# Patient Record
Sex: MALE | Race: WHITE | Employment: FULL TIME | URBAN - METROPOLITAN AREA
[De-identification: names, ages, dates, MRNs, and addresses within clinical notes are randomized per-mention and may not be internally consistent; named-entity substitution may affect disease eponyms.]

---

## 2018-01-10 ENCOUNTER — OFFICE VISIT (OUTPATIENT)
Dept: FAMILY MEDICINE CLINIC | Age: 23
End: 2018-01-10
Payer: COMMERCIAL

## 2018-01-10 VITALS
WEIGHT: 152 LBS | BODY MASS INDEX: 27.97 KG/M2 | HEIGHT: 62 IN | OXYGEN SATURATION: 97 % | DIASTOLIC BLOOD PRESSURE: 72 MMHG | TEMPERATURE: 98.4 F | RESPIRATION RATE: 16 BRPM | HEART RATE: 102 BPM | SYSTOLIC BLOOD PRESSURE: 107 MMHG

## 2018-01-10 DIAGNOSIS — Z13.31 POSITIVE DEPRESSION SCREENING: ICD-10-CM

## 2018-01-10 DIAGNOSIS — J32.9 CHRONIC SINUSITIS, UNSPECIFIED LOCATION: Primary | ICD-10-CM

## 2018-01-10 LAB — S PYO AG THROAT QL: NORMAL

## 2018-01-10 PROCEDURE — 87880 STREP A ASSAY W/OPTIC: CPT | Performed by: FAMILY MEDICINE

## 2018-01-10 PROCEDURE — 99213 OFFICE O/P EST LOW 20 MIN: CPT | Performed by: FAMILY MEDICINE

## 2018-01-10 PROCEDURE — G8431 POS CLIN DEPRES SCRN F/U DOC: HCPCS | Performed by: FAMILY MEDICINE

## 2018-01-10 PROCEDURE — 96127 BRIEF EMOTIONAL/BEHAV ASSMT: CPT | Performed by: FAMILY MEDICINE

## 2018-01-10 RX ORDER — VILAZODONE HYDROCHLORIDE 20 MG/1
20 TABLET ORAL DAILY
COMMUNITY

## 2018-01-10 RX ORDER — NORETHINDRONE ACETATE AND ETHINYL ESTRADIOL 1.5-30(21)
1 KIT ORAL
COMMUNITY
Start: 2017-09-14 | End: 2020-05-26 | Stop reason: ALTCHOICE

## 2018-01-10 RX ORDER — AMOXICILLIN 250 MG/1
250 CAPSULE ORAL 3 TIMES DAILY
Qty: 30 CAPSULE | Refills: 0 | Status: SHIPPED | OUTPATIENT
Start: 2018-01-10 | End: 2018-01-20

## 2018-01-10 RX ORDER — AMOXICILLIN 250 MG/1
250 CAPSULE ORAL 3 TIMES DAILY
Qty: 30 CAPSULE | Refills: 0 | Status: SHIPPED | OUTPATIENT
Start: 2018-01-10 | End: 2018-01-10 | Stop reason: SDUPTHER

## 2018-01-10 RX ORDER — CETIRIZINE HYDROCHLORIDE 10 MG/1
10 TABLET ORAL DAILY
Qty: 30 TABLET | Refills: 3 | Status: SHIPPED | OUTPATIENT
Start: 2018-01-10 | End: 2018-12-03 | Stop reason: SDUPTHER

## 2018-01-10 RX ORDER — BUSPIRONE HYDROCHLORIDE 15 MG/1
20 TABLET ORAL
COMMUNITY
End: 2021-01-04 | Stop reason: SDUPTHER

## 2018-01-10 RX ORDER — CETIRIZINE HYDROCHLORIDE 10 MG/1
10 TABLET ORAL DAILY
Qty: 30 TABLET | Refills: 3 | Status: SHIPPED | OUTPATIENT
Start: 2018-01-10 | End: 2018-01-10 | Stop reason: SDUPTHER

## 2018-01-10 ASSESSMENT — PATIENT HEALTH QUESTIONNAIRE - PHQ9
SUM OF ALL RESPONSES TO PHQ QUESTIONS 1-9: 10
7. TROUBLE CONCENTRATING ON THINGS, SUCH AS READING THE NEWSPAPER OR WATCHING TELEVISION: 1
8. MOVING OR SPEAKING SO SLOWLY THAT OTHER PEOPLE COULD HAVE NOTICED. OR THE OPPOSITE, BEING SO FIGETY OR RESTLESS THAT YOU HAVE BEEN MOVING AROUND A LOT MORE THAN USUAL: 0
6. FEELING BAD ABOUT YOURSELF - OR THAT YOU ARE A FAILURE OR HAVE LET YOURSELF OR YOUR FAMILY DOWN: 1
9. THOUGHTS THAT YOU WOULD BE BETTER OFF DEAD, OR OF HURTING YOURSELF: 0
SUM OF ALL RESPONSES TO PHQ9 QUESTIONS 1 & 2: 3
3. TROUBLE FALLING OR STAYING ASLEEP: 2
1. LITTLE INTEREST OR PLEASURE IN DOING THINGS: 1
2. FEELING DOWN, DEPRESSED OR HOPELESS: 2
5. POOR APPETITE OR OVEREATING: 0
4. FEELING TIRED OR HAVING LITTLE ENERGY: 3
10. IF YOU CHECKED OFF ANY PROBLEMS, HOW DIFFICULT HAVE THESE PROBLEMS MADE IT FOR YOU TO DO YOUR WORK, TAKE CARE OF THINGS AT HOME, OR GET ALONG WITH OTHER PEOPLE: 1

## 2018-01-10 NOTE — PROGRESS NOTES
Visit Information    Have you changed or started any medications since your last visit including any over-the-counter medicines, vitamins, or herbal medicines? no   Are you having any side effects from any of your medications? -  no  Have you stopped taking any of your medications? Is so, why? -  no    Have you seen any other physician or provider since your last visit? Yes - Records Obtained  Have you had any other diagnostic tests since your last visit? No  Have you been seen in the emergency room and/or had an admission to a hospital since we last saw you? No  Have you had your routine dental cleaning in the past 6 months? no    Have you activated your [employer] account? If not, what are your barriers?  No: declined     Patient Care Team:  Cyn Flores CNP as PCP - General (Nurse Practitioner)    Medical History Review  Past Medical, Family, and Social History reviewed and does not contribute to the patient presenting condition    Health Maintenance   Topic Date Due    HIV screen  07/29/2011    Meningococcal (MCV) Vaccine Age 0-22 Years (1 of 1) 07/29/2012    Chlamydia screen  07/29/2012    DTaP/Tdap/Td vaccine (1 - Tdap) 07/29/2015    Cervical cancer screen  07/29/2017    Flu vaccine (1) 09/01/2017

## 2018-12-20 ENCOUNTER — OFFICE VISIT (OUTPATIENT)
Dept: FAMILY MEDICINE CLINIC | Age: 23
End: 2018-12-20
Payer: COMMERCIAL

## 2018-12-20 VITALS
HEART RATE: 90 BPM | HEIGHT: 62 IN | WEIGHT: 180.6 LBS | BODY MASS INDEX: 33.23 KG/M2 | RESPIRATION RATE: 14 BRPM | TEMPERATURE: 97.9 F | SYSTOLIC BLOOD PRESSURE: 107 MMHG | DIASTOLIC BLOOD PRESSURE: 72 MMHG

## 2018-12-20 DIAGNOSIS — R09.82 POSTNASAL DRIP: Primary | ICD-10-CM

## 2018-12-20 PROCEDURE — 99213 OFFICE O/P EST LOW 20 MIN: CPT | Performed by: FAMILY MEDICINE

## 2018-12-20 RX ORDER — TESTOSTERONE CYPIONATE 100 MG/ML
100 INJECTION, SOLUTION INTRAMUSCULAR ONCE
COMMUNITY
End: 2022-04-08

## 2018-12-20 RX ORDER — MONTELUKAST SODIUM 10 MG/1
10 TABLET ORAL NIGHTLY
Qty: 30 TABLET | Refills: 6 | Status: SHIPPED | OUTPATIENT
Start: 2018-12-20 | End: 2020-05-26 | Stop reason: ALTCHOICE

## 2018-12-20 RX ORDER — FLUTICASONE PROPIONATE 50 MCG
1 SPRAY, SUSPENSION (ML) NASAL DAILY
Qty: 2 BOTTLE | Refills: 1 | Status: SHIPPED | OUTPATIENT
Start: 2018-12-20 | End: 2021-01-04

## 2018-12-20 ASSESSMENT — PATIENT HEALTH QUESTIONNAIRE - PHQ9
SUM OF ALL RESPONSES TO PHQ QUESTIONS 1-9: 0
1. LITTLE INTEREST OR PLEASURE IN DOING THINGS: 0
SUM OF ALL RESPONSES TO PHQ9 QUESTIONS 1 & 2: 0
2. FEELING DOWN, DEPRESSED OR HOPELESS: 0
SUM OF ALL RESPONSES TO PHQ QUESTIONS 1-9: 0

## 2019-12-02 ENCOUNTER — OFFICE VISIT (OUTPATIENT)
Dept: FAMILY MEDICINE CLINIC | Age: 24
End: 2019-12-02
Payer: COMMERCIAL

## 2019-12-02 ENCOUNTER — HOSPITAL ENCOUNTER (OUTPATIENT)
Age: 24
Setting detail: SPECIMEN
Discharge: HOME | End: 2019-12-02
Payer: COMMERCIAL

## 2019-12-02 VITALS
SYSTOLIC BLOOD PRESSURE: 112 MMHG | DIASTOLIC BLOOD PRESSURE: 78 MMHG | OXYGEN SATURATION: 96 % | HEART RATE: 73 BPM | WEIGHT: 179.8 LBS | TEMPERATURE: 97.2 F | BODY MASS INDEX: 32.89 KG/M2

## 2019-12-02 DIAGNOSIS — R30.9 PAINFUL URINATION: ICD-10-CM

## 2019-12-02 DIAGNOSIS — R30.9 PAINFUL URINATION: Primary | ICD-10-CM

## 2019-12-02 LAB
BILIRUBIN, POC: NORMAL
BLOOD URINE, POC: NORMAL
CLARITY, POC: CLEAR
COLOR, POC: YELLOW
GLUCOSE URINE, POC: NORMAL
KETONES, POC: NORMAL
LEUKOCYTE EST, POC: NORMAL
NITRITE, POC: NORMAL
PH, POC: 6.5
PROTEIN, POC: NORMAL
SPECIFIC GRAVITY, POC: 1.02
UROBILINOGEN, POC: 0.2

## 2019-12-02 PROCEDURE — 99213 OFFICE O/P EST LOW 20 MIN: CPT | Performed by: FAMILY MEDICINE

## 2019-12-02 PROCEDURE — 81003 URINALYSIS AUTO W/O SCOPE: CPT | Performed by: FAMILY MEDICINE

## 2019-12-02 RX ORDER — ATORVASTATIN CALCIUM 10 MG/1
TABLET, FILM COATED ORAL
Refills: 3 | COMMUNITY
Start: 2019-11-26

## 2019-12-04 ENCOUNTER — TELEPHONE (OUTPATIENT)
Dept: FAMILY MEDICINE CLINIC | Age: 24
End: 2019-12-04

## 2019-12-04 DIAGNOSIS — B96.20 E. COLI UTI: Primary | ICD-10-CM

## 2019-12-04 DIAGNOSIS — N39.0 E. COLI UTI: Primary | ICD-10-CM

## 2019-12-04 LAB
CULTURE: ABNORMAL
Lab: ABNORMAL
SPECIMEN DESCRIPTION: ABNORMAL

## 2019-12-04 RX ORDER — CEPHALEXIN 500 MG/1
500 CAPSULE ORAL 4 TIMES DAILY
Qty: 20 CAPSULE | Refills: 0 | Status: SHIPPED | OUTPATIENT
Start: 2019-12-04 | End: 2019-12-09

## 2019-12-26 ENCOUNTER — OFFICE VISIT (OUTPATIENT)
Dept: FAMILY MEDICINE CLINIC | Age: 24
End: 2019-12-26
Payer: COMMERCIAL

## 2019-12-26 VITALS
TEMPERATURE: 97.5 F | HEART RATE: 81 BPM | HEIGHT: 62 IN | SYSTOLIC BLOOD PRESSURE: 115 MMHG | DIASTOLIC BLOOD PRESSURE: 75 MMHG | WEIGHT: 181.6 LBS | BODY MASS INDEX: 33.42 KG/M2 | RESPIRATION RATE: 14 BRPM

## 2019-12-26 DIAGNOSIS — K21.9 GASTROESOPHAGEAL REFLUX DISEASE, ESOPHAGITIS PRESENCE NOT SPECIFIED: Primary | ICD-10-CM

## 2019-12-26 PROCEDURE — 99213 OFFICE O/P EST LOW 20 MIN: CPT | Performed by: FAMILY MEDICINE

## 2019-12-26 RX ORDER — CIMETIDINE 800 MG
800 TABLET ORAL 2 TIMES DAILY
Qty: 30 TABLET | Refills: 3 | Status: SHIPPED | OUTPATIENT
Start: 2019-12-26 | End: 2020-04-14 | Stop reason: SDUPTHER

## 2020-04-14 ENCOUNTER — TELEPHONE (OUTPATIENT)
Dept: FAMILY MEDICINE CLINIC | Age: 25
End: 2020-04-14

## 2020-04-14 RX ORDER — CIMETIDINE 800 MG
800 TABLET ORAL 2 TIMES DAILY
Qty: 30 TABLET | Refills: 3 | Status: SHIPPED | OUTPATIENT
Start: 2020-04-14 | End: 2020-06-12 | Stop reason: SDUPTHER

## 2020-05-26 ENCOUNTER — HOSPITAL ENCOUNTER (OUTPATIENT)
Age: 25
Setting detail: SPECIMEN
Discharge: HOME | End: 2020-05-26
Payer: COMMERCIAL

## 2020-05-26 ENCOUNTER — OFFICE VISIT (OUTPATIENT)
Dept: PRIMARY CARE CLINIC | Age: 25
End: 2020-05-26
Payer: COMMERCIAL

## 2020-05-26 ENCOUNTER — TELEPHONE (OUTPATIENT)
Dept: FAMILY MEDICINE CLINIC | Age: 25
End: 2020-05-26

## 2020-05-26 VITALS
HEIGHT: 62 IN | DIASTOLIC BLOOD PRESSURE: 82 MMHG | HEART RATE: 85 BPM | OXYGEN SATURATION: 96 % | SYSTOLIC BLOOD PRESSURE: 126 MMHG | BODY MASS INDEX: 33.13 KG/M2 | TEMPERATURE: 99.9 F | WEIGHT: 180 LBS

## 2020-05-26 PROCEDURE — G8417 CALC BMI ABV UP PARAM F/U: HCPCS | Performed by: FAMILY MEDICINE

## 2020-05-26 PROCEDURE — 99213 OFFICE O/P EST LOW 20 MIN: CPT | Performed by: FAMILY MEDICINE

## 2020-05-26 PROCEDURE — 1036F TOBACCO NON-USER: CPT | Performed by: FAMILY MEDICINE

## 2020-05-26 PROCEDURE — G8427 DOCREV CUR MEDS BY ELIG CLIN: HCPCS | Performed by: FAMILY MEDICINE

## 2020-05-26 RX ORDER — EMTRICITABINE AND TENOFOVIR ALAFENAMIDE 200; 25 MG/1; MG/1
1 TABLET ORAL DAILY
COMMUNITY

## 2020-05-26 RX ORDER — LEVOTHYROXINE SODIUM 0.05 MG/1
50 TABLET ORAL DAILY
COMMUNITY

## 2020-05-26 RX ORDER — LORATADINE 10 MG/1
TABLET ORAL
COMMUNITY

## 2020-05-26 NOTE — TELEPHONE ENCOUNTER
Patient is calling for a return to work clearance      When did your symptoms first begin? 05/22/2020    When was the last day you had any symptoms including fever? 05/24/2020    When was the last date you took medication to treat a fever? Did not take anything for fever. Have you had a positive test result for COVID-19? No, no testing done. Does your employer require you to be tested for COVID-19 before you return to work? No    Do you need a letter to return to work?  Yes

## 2020-05-26 NOTE — PROGRESS NOTES
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 3 days    SOB: yes  Dry Cough: no    Nasal congestion/rhinorrhea: slight  Sinus pressure: no  Sore throat: slight    Any GI sx? diarrhrea    Fever: yes  Myalgias: no  Fatigue: yes    Fluid intake: good  Appetite: normla        Risk Factors  Smoker?: no  COPD/underlying lung disease?: no  CAD/CHF?: no  DM2?: no  CKD?: no  Liver disease?: no  Immunosuppressed?: no  Travel recently/Where?: no    Objective:  Physical Exam   Vitals:   Vitals:    05/26/20 1453   BP: 126/82   Site: Right Upper Arm   Position: Sitting   Cuff Size: Medium Adult   Pulse: 85   Temp: 99.9 °F (37.7 °C)   TempSrc: Oral   SpO2: 96%   Weight: 180 lb (81.6 kg)   Height: 5' 2\" (1.575 m)     Wt Readings from Last 3 Encounters:   05/26/20 180 lb (81.6 kg)   12/26/19 181 lb 9.6 oz (82.4 kg)   12/02/19 179 lb 12.8 oz (81.6 kg)     Ht Readings from Last 3 Encounters:   05/26/20 5' 2\" (1.575 m)   12/26/19 5' 2\" (1.575 m)   12/20/18 5' 2\" (1.575 m)     Body mass index is 32.92 kg/m². Constitutional: He is oriented to person, place, and time. He appears well-developed and well-nourished and in no acute distress. Answers all my questions appropriately. Head: Normocephalic and atraumatic. Eyes:conjunctiva appear normal.  Right Ear: External ear normal. TM is clear  Left Ear: External ear normal. TM is clear  Nose: pink, non-edematous mucosa. No polyps. No septal deviation, septal piercing noted. Throat: no erythema, tonsillar hypertrophy or exudate. No ulcerations noted. Lips/Teeth/Gums all appear normal.  Neck: Normal range of motion. Neck supple. No tracheal deviation present. No abnormal lymphadenopathy.      Heart - RRR w/o murmur. No S3/S4 noted  Chest: Clear to auscultation bilaterally. Good breath sounds noted. No rales, wheezes, or rhonchi noted. No respiratory retractions noted. Wall has symmetrical movement with respirations. COVID-19 pcr:  Pending  Assessment:   Encounter Diagnosis   Name Primary?  Flu-like symptoms Yes         Plan:   Medications Discontinued During This Encounter   Medication Reason    montelukast (SINGULAIR) 10 MG tablet Therapy completed    norethindrone-ethinyl estradiol-iron (MICROGESTIN FE1.5/30) 1.5-30 MG-MCG tablet Therapy completed    RA ALLERGY RELIEF 10 MG tablet Therapy completed     THE ABOVE NOTED DISCONTINUED MEDS MAY ONLY BE FROM 'CLEANING UP' THE MED LIST AND WERE NOT ACTUALLY CANCELED;  SEE CHART FOR DETAILS! No orders of the defined types were placed in this encounter. Orders Placed This Encounter   Procedures    Covid-19 Ambulatory     Standing Status:   Future     Standing Expiration Date:   5/26/2021     Scheduling Instructions:      Saline media preferred given current shortage of viral transport media but both acceptable     Return in about 1 week (around 6/2/2020). Patient Instructions   The COVID-19 test that was done today can take 1-6 days for results. Until then you should assume you have this disease and adhere to home isolation as described below. When we get the test results back, one of the following readings will be obtained. 1. A positive test means you have the virus. 2.  An inconclusive test means it wasn't sure if you have the virus or not. An inconclusive test result is treated as a positive result and recommendations  are the same as a positive test result. We may ask you to repeat this test in this circumstance. 3.  A negative test means you probably do not have the virus.       Prevention steps for People with positive or inconclusive test results or suspected  COVID-19 (including persons under investigation) who do not

## 2020-05-28 ENCOUNTER — CARE COORDINATION (OUTPATIENT)
Dept: CARE COORDINATION | Age: 25
End: 2020-05-28

## 2020-05-28 LAB — SARS-COV-2, NAA: NOT DETECTED

## 2020-05-29 ENCOUNTER — CARE COORDINATION (OUTPATIENT)
Dept: CARE COORDINATION | Age: 25
End: 2020-05-29

## 2020-06-02 ENCOUNTER — TELEMEDICINE (OUTPATIENT)
Dept: FAMILY MEDICINE CLINIC | Age: 25
End: 2020-06-02
Payer: COMMERCIAL

## 2020-06-02 PROCEDURE — 99213 OFFICE O/P EST LOW 20 MIN: CPT | Performed by: FAMILY MEDICINE

## 2020-06-02 PROCEDURE — G8427 DOCREV CUR MEDS BY ELIG CLIN: HCPCS | Performed by: FAMILY MEDICINE

## 2020-06-02 PROCEDURE — 1036F TOBACCO NON-USER: CPT | Performed by: FAMILY MEDICINE

## 2020-06-02 PROCEDURE — G8417 CALC BMI ABV UP PARAM F/U: HCPCS | Performed by: FAMILY MEDICINE

## 2020-06-02 ASSESSMENT — PATIENT HEALTH QUESTIONNAIRE - PHQ9
SUM OF ALL RESPONSES TO PHQ QUESTIONS 1-9: 0
SUM OF ALL RESPONSES TO PHQ QUESTIONS 1-9: 0
2. FEELING DOWN, DEPRESSED OR HOPELESS: 0
SUM OF ALL RESPONSES TO PHQ9 QUESTIONS 1 & 2: 0
1. LITTLE INTEREST OR PLEASURE IN DOING THINGS: 0

## 2020-06-02 NOTE — PROGRESS NOTES
 Depression    , History reviewed. No pertinent surgical history. ,   Social History     Tobacco Use    Smoking status: Never Smoker    Smokeless tobacco: Never Used   Substance Use Topics    Alcohol use: No    Drug use: No   , History reviewed. No pertinent family history. PHYSICAL EXAMINATION:  [ INSTRUCTIONS:  \"[x]\" Indicates a positive item  \"[]\" Indicates a negative item  -- DELETE ALL ITEMS NOT EXAMINED]  Vital Signs: (As obtained by patient/caregiver or practitioner observation)    Blood pressure-  Heart rate-    Respiratory rate-    Temperature-  Pulse oximetry-     Constitutional: [x] Appears well-developed and well-nourished [x] No apparent distress      [] Abnormal-   Mental status  [x] Alert and awake  [x] Oriented to person/place/time [x]Able to follow commands      Eyes:  EOM    [x]  Normal  [] Abnormal-  Sclera  [x]  Normal  [] Abnormal -         Discharge [x]  None visible  [] Abnormal -    HENT:   [x] Normocephalic, atraumatic. [] Abnormal   [x] Mouth/Throat: Mucous membranes are moist.     External Ears [x] Normal  [] Abnormal-     Neck: [x] No visualized mass     Pulmonary/Chest: [x] Respiratory effort normal.  [x] No visualized signs of difficulty breathing or respiratory distress        [] Abnormal-      Musculoskeletal:   [x] Normal gait with no signs of ataxia         [x] Normal range of motion of neck        [] Abnormal-       Neurological:        [x] No Facial Asymmetry (Cranial nerve 7 motor function) (limited exam to video visit)          [x] No gaze palsy        [] Abnormal-         Skin:        [x] No significant exanthematous lesions or discoloration noted on facial skin         [] Abnormal-            Psychiatric:       [x] Normal Affect [x] No Hallucinations        [] Abnormal-     Other pertinent observable physical exam findings-     ASSESSMENT/PLAN:  1. Viral URI      rtw today     Return if symptoms worsen or fail to improve.     Sarah Beth Wolfe is a 22 y.o. adult

## 2020-06-12 RX ORDER — CIMETIDINE 800 MG
800 TABLET ORAL 2 TIMES DAILY
Qty: 30 TABLET | Refills: 3 | Status: SHIPPED | OUTPATIENT
Start: 2020-06-12 | End: 2020-06-22 | Stop reason: SDUPTHER

## 2020-06-22 RX ORDER — CIMETIDINE 800 MG
800 TABLET ORAL 2 TIMES DAILY
Qty: 90 TABLET | Refills: 3 | Status: SHIPPED | OUTPATIENT
Start: 2020-06-22 | End: 2021-03-22 | Stop reason: SDUPTHER

## 2020-06-22 NOTE — TELEPHONE ENCOUNTER
Patient calling because his insurance only pays for 90 day prescription for medication through mail delivery pharmacy.  Please advise

## 2020-08-10 ENCOUNTER — OFFICE VISIT (OUTPATIENT)
Dept: FAMILY MEDICINE CLINIC | Age: 25
End: 2020-08-10
Payer: COMMERCIAL

## 2020-08-10 VITALS
OXYGEN SATURATION: 96 % | SYSTOLIC BLOOD PRESSURE: 118 MMHG | TEMPERATURE: 97.9 F | WEIGHT: 181.6 LBS | DIASTOLIC BLOOD PRESSURE: 74 MMHG | BODY MASS INDEX: 33.22 KG/M2 | HEART RATE: 72 BPM

## 2020-08-10 PROCEDURE — G8427 DOCREV CUR MEDS BY ELIG CLIN: HCPCS | Performed by: FAMILY MEDICINE

## 2020-08-10 PROCEDURE — 1036F TOBACCO NON-USER: CPT | Performed by: FAMILY MEDICINE

## 2020-08-10 PROCEDURE — 99213 OFFICE O/P EST LOW 20 MIN: CPT | Performed by: FAMILY MEDICINE

## 2020-08-10 PROCEDURE — G8417 CALC BMI ABV UP PARAM F/U: HCPCS | Performed by: FAMILY MEDICINE

## 2020-08-10 PROCEDURE — 4130F TOPICAL PREP RX AOE: CPT | Performed by: FAMILY MEDICINE

## 2020-08-10 ASSESSMENT — PATIENT HEALTH QUESTIONNAIRE - PHQ9
2. FEELING DOWN, DEPRESSED OR HOPELESS: 0
SUM OF ALL RESPONSES TO PHQ QUESTIONS 1-9: 0
SUM OF ALL RESPONSES TO PHQ9 QUESTIONS 1 & 2: 0
SUM OF ALL RESPONSES TO PHQ QUESTIONS 1-9: 0
1. LITTLE INTEREST OR PLEASURE IN DOING THINGS: 0

## 2020-08-10 NOTE — PROGRESS NOTES
General FM note    Jamar Sifuentes is a 26 y.o. adult who presents today for follow up on his  medical conditions as noted below. Jamar Sifuentes is c/o of   Chief Complaint   Patient presents with    Otalgia     right       Patient Active Problem List:     Depression with anxiety     Excessive daytime sleepiness     Flat feet, bilateral     Pain in both feet     Past Medical History:   Diagnosis Date    Depression       No past surgical history on file. No family history on file. Current Outpatient Medications   Medication Sig Dispense Refill    ciprofloxacin-dexamethasone (CIPRODEX) 0.3-0.1 % otic suspension Place 4 drops into both ears 2 times daily for 7 days 1 Bottle 0    cimetidine (TAGAMET) 800 MG tablet Take 1 tablet by mouth 2 times daily 90 tablet 3    loratadine (CLARITIN) 10 MG tablet Claritin TABS   Refills: 0    Active      levothyroxine (SYNTHROID) 50 MCG tablet Take 50 mcg by mouth Daily      Ferrous Sulfate (IRON PO) Take by mouth      emtricitabine-tenofovir alafenamide (DESCOVY) 200-25 MG TABS tablet Take 1 tablet by mouth daily      VITAMIN D PO Take by mouth      atorvastatin (LIPITOR) 10 MG tablet TAKE 1 TABLET BY MOUTH ONE TIME A DAY  3    testosterone cypionate (DEPOTESTOTERONE CYPIONATE) 100 MG/ML injection Inject 100 mg into the muscle once. TAKES 0.2ML .  busPIRone (BUSPAR) 15 MG tablet Take 20 mg by mouth       vilazodone HCl (VIIBRYD) 20 MG TABS Take 40 mg by mouth      fluticasone (FLONASE) 50 MCG/ACT nasal spray 1 spray by Each Nare route daily 1 Spray in each nostril (Patient not taking: Reported on 5/26/2020) 2 Bottle 1     No current facility-administered medications for this visit. ALLERGIES:    Allergies   Allergen Reactions    Bupropion      Unknown Reaction. Social History     Tobacco Use    Smoking status: Never Smoker    Smokeless tobacco: Never Used   Substance Use Topics    Alcohol use: No      Body mass index is 33.22 kg/m².   BP

## 2020-09-28 ENCOUNTER — NURSE ONLY (OUTPATIENT)
Dept: FAMILY MEDICINE CLINIC | Age: 25
End: 2020-09-28

## 2021-01-04 ENCOUNTER — HOSPITAL ENCOUNTER (OUTPATIENT)
Age: 26
Setting detail: SPECIMEN
Discharge: HOME | End: 2021-01-04
Payer: COMMERCIAL

## 2021-01-04 ENCOUNTER — OFFICE VISIT (OUTPATIENT)
Dept: FAMILY MEDICINE CLINIC | Age: 26
End: 2021-01-04
Payer: COMMERCIAL

## 2021-01-04 VITALS — OXYGEN SATURATION: 98 % | HEART RATE: 86 BPM | TEMPERATURE: 97.9 F

## 2021-01-04 DIAGNOSIS — J02.0 ACUTE STREPTOCOCCAL PHARYNGITIS: Primary | ICD-10-CM

## 2021-01-04 DIAGNOSIS — J02.9 SORE THROAT: ICD-10-CM

## 2021-01-04 LAB — S PYO AG THROAT QL: POSITIVE

## 2021-01-04 PROCEDURE — 87880 STREP A ASSAY W/OPTIC: CPT | Performed by: PHYSICIAN ASSISTANT

## 2021-01-04 PROCEDURE — 99213 OFFICE O/P EST LOW 20 MIN: CPT | Performed by: PHYSICIAN ASSISTANT

## 2021-01-04 RX ORDER — BUSPIRONE HYDROCHLORIDE 10 MG/1
TABLET ORAL
COMMUNITY
Start: 2020-11-18

## 2021-01-04 RX ORDER — AMOXICILLIN 500 MG/1
500 CAPSULE ORAL 3 TIMES DAILY
Qty: 30 CAPSULE | Refills: 0 | Status: SHIPPED | OUTPATIENT
Start: 2021-01-04 | End: 2021-01-14

## 2021-01-04 RX ORDER — NEEDLES, DISPOSABLE 25GX5/8"
NEEDLE, DISPOSABLE MISCELLANEOUS
COMMUNITY
Start: 2020-11-16

## 2021-01-04 RX ORDER — TESTOSTERONE CYPIONATE 200 MG/ML
INJECTION INTRAMUSCULAR
COMMUNITY
Start: 2020-12-21

## 2021-01-04 RX ORDER — ERGOCALCIFEROL 1.25 MG/1
CAPSULE ORAL
COMMUNITY
Start: 2020-11-13

## 2021-01-04 RX ORDER — PREDNISONE 20 MG/1
20 TABLET ORAL 2 TIMES DAILY
Qty: 10 TABLET | Refills: 0 | Status: SHIPPED | OUTPATIENT
Start: 2021-01-04 | End: 2021-01-09

## 2021-01-04 ASSESSMENT — ENCOUNTER SYMPTOMS
NAUSEA: 0
EYES NEGATIVE: 1
SINUS PRESSURE: 0
SORE THROAT: 1
CHANGE IN BOWEL HABIT: 0
VOMITING: 0
SWOLLEN GLANDS: 1
CHEST TIGHTNESS: 0
ABDOMINAL PAIN: 0
SINUS PAIN: 0
VISUAL CHANGE: 0
COUGH: 0

## 2021-01-04 NOTE — PROGRESS NOTES
[address] [place] [address]  [place] [address] [place] [address] [address] [address]  Phone: [phone]  Fax: [phone]       [address] [place] [address] Name: Eyad Blanc  MRN: [id number]  Brett 1995  Date of evaluation: 1/4/2021  Provider: Casper Arndt PA-C     CHIEF COMPLAINT       Chief Complaint   Patient presents with    Pharyngitis     and fever x 3 days           HISTORY OF PRESENT ILLNESS  (Location/Symptom, Timing/Onset, Context/Setting, Quality, Duration, Modifying Factors, Severity.)   Eyad Blanc is a 26 y.o. White [1] adult who presents to the office for evaluation of      Pharyngitis  This is a new problem. The current episode started in the past 7 days. The problem occurs daily. The problem has been waxing and waning. Associated symptoms include fatigue, a fever, myalgias, a sore throat and swollen glands. Pertinent negatives include no abdominal pain, anorexia, arthralgias, change in bowel habit, chest pain, chills, congestion, coughing, diaphoresis, headaches, joint swelling, nausea, neck pain, numbness, rash, urinary symptoms, vertigo, visual change, vomiting or weakness. The symptoms are aggravated by drinking, eating and swallowing. He has tried acetaminophen and NSAIDs for the symptoms. Nursing Notes were reviewed. REVIEW OF SYSTEMS    (2-9 systems for level 4, 10 or more for level 5)     Review of Systems   Constitutional: Positive for fatigue and fever. Negative for chills and diaphoresis. HENT: Positive for postnasal drip and sore throat. Negative for congestion, ear discharge, ear pain, sinus pressure and sinus pain. Eyes: Negative. Respiratory: Negative for cough and chest tightness. Cardiovascular: Negative for chest pain. Gastrointestinal: Negative for abdominal pain, anorexia, change in bowel habit, nausea and vomiting. Genitourinary: Negative. Musculoskeletal: Positive for myalgias. Negative for arthralgias, joint swelling and neck pain. Skin: Negative for rash. Neurological: Negative for vertigo, weakness, numbness and headaches. Except as noted above the remainder of the review of systems was reviewed andnegative. PAST MEDICAL HISTORY   History reviewed. Past Medical History:   Diagnosis Date    Depression          SURGICAL HISTORY     History reviewed. Past Surgical History:   Procedure Laterality Date    WISDOM TOOTH EXTRACTION           CURRENT MEDICATIONS       Current Outpatient Medications   Medication Sig Dispense Refill    busPIRone (BUSPAR) 10 MG tablet 20 mg daily       vitamin D (ERGOCALCIFEROL) 1.25 MG (74298 UT) CAPS capsule       BD DISP NEEDLE 23G X 1\" MISC       testosterone cypionate (DEPOTESTOTERONE CYPIONATE) 200 MG/ML injection       amoxicillin (AMOXIL) 500 MG capsule Take 1 capsule by mouth 3 times daily for 10 days 30 capsule 0    predniSONE (DELTASONE) 20 MG tablet Take 1 tablet by mouth 2 times daily for 5 days 10 tablet 0    cimetidine (TAGAMET) 800 MG tablet Take 1 tablet by mouth 2 times daily 90 tablet 3    loratadine (CLARITIN) 10 MG tablet Claritin TABS   Refills: 0    Active      levothyroxine (SYNTHROID) 50 MCG tablet Take 50 mcg by mouth Daily      Ferrous Sulfate (IRON PO) Take by mouth      emtricitabine-tenofovir alafenamide (DESCOVY) 200-25 MG TABS tablet Take 1 tablet by mouth daily      atorvastatin (LIPITOR) 10 MG tablet TAKE 1 TABLET BY MOUTH ONE TIME A DAY  3    vilazodone HCl (VIIBRYD) 20 MG TABS Take 40 mg by mouth      testosterone cypionate (DEPOTESTOTERONE CYPIONATE) 100 MG/ML injection Inject 100 mg into the muscle once. TAKES 0.2ML . No current facility-administered medications for this visit. ALLERGIES     Bupropion    FAMILY HISTORY     No family history on file.   Family Status   Relation Name Status    Mother  Alive    Father  Alive Order Specific Question:   Date of Symptom Onset     Answer:   1/2/2021     Order Specific Question:   Hospitalized for COVID-19? Answer:   No     Order Specific Question:   Admitted to ICU for COVID-19? Answer:   No     Order Specific Question:   Employed in healthcare setting? Answer:   Unknown     Order Specific Question:   Resident in a congregate (group) care setting? Answer:   Unknown     Order Specific Question:   Pregnant? Answer:   Unknown     Order Specific Question:   Pregnant: Answer:   No     Order Specific Question:   Previously tested for COVID-19? Answer: Yes    POCT rapid strep A       Results for orders placed or performed in visit on 01/04/21   POCT rapid strep A   Result Value Ref Range    Strep A Ag Positive (A) None Detected       FINALIMPRESSION      Visit Diagnoses and Associated Orders     Acute streptococcal pharyngitis    -  Primary    COVID-19 Ambulatory [[id number] Custom]   - Future Order         Sore throat        POCT rapid strep A [[id number] Custom]      COVID-19 Ambulatory [[id number] Custom]   - Future Order         ORDERS WITHOUT AN ASSOCIATED DIAGNOSIS    busPIRone (BUSPAR) 10 MG tablet [[id number]]      vitamin D (ERGOCALCIFEROL) 1.25 MG (57025 UT) CAPS capsule [[id number]]      BD DISP NEEDLE 23G X 1\" MISC [[id number]]      testosterone cypionate (DEPOTESTOTERONE CYPIONATE) 200 MG/ML injection [[id number]]      amoxicillin (AMOXIL) 500 MG capsule [451]      predniSONE (DELTASONE) 20 MG tablet [6496]              PLAN     Return if symptoms worsen or fail to improve.       DISCHARGEMEDICATIONS:  Orders Placed This Encounter   Medications    amoxicillin (AMOXIL) 500 MG capsule     Sig: Take 1 capsule by mouth 3 times daily for 10 days     Dispense:  30 capsule     Refill:  0    predniSONE (DELTASONE) 20 MG tablet     Sig: Take 1 tablet by mouth 2 times daily for 5 days     Dispense:  10 tablet     Refill:  0         Plan: Specimen sent for a culture. Possible treatment alteration based on the results. Patient instructed to complete entire antibiotic course. Tylenol/Motrin as needed for fever/discomfort. Change toothbrush in 24 hours. Salt water gargles and throat lozenges if desired. Patient agreeable to treatment plan. Educational materials provided on AVS.  Follow up if symptoms do not improve/worsen. Steps to help prevent the spread of COVID-19 if you are sick  SOURCE - [email or web address]info/. html     Stay home except to get medical care   ; Stay home: People who are mildly ill with COVID-19 are able to isolate at home during their illness. You should restrict activities outside your home, except for getting medical care.   ; Avoid public areas: Do not go to work, school, or public areas.   ; Avoid public transportation: Avoid using public transportation, ride-sharing, or taxis.  ; Separate yourself from other people and animals in your home   ; Stay away from others: As much as possible, you should stay in a specific room and away from other people in your home. Also, you should use a separate bathroom, if available.   ; Limit contact with pets & animals: You should restrict contact with pets and other animals while you are sick with COVID-19, just like you would around other people. Although there have not been reports of pets or other animals becoming sick with COVID-19, it is still recommended that people sick with COVID-19 limit contact with animals until more information is known about the virus. ; When possible, have another member of your household care for your animals while you are sick. If you are sick with COVID-19, avoid contact with your pet, including petting, snuggling, being kissed or licked, and sharing food. If you must care for your pet or be around animals while you are sick, wash your hands before and after you interact with pets and wear a facemask. See COVID-19 and Animals for more information. Other considerations  ? The ill person should eat/be fed in their room if possible. Non-disposable  items used should be handled with gloves and washed with hot water or in a . Clean hands after handling used  items. ? If possible, dedicate a lined trash can for the ill person. Use gloves when removing garbage bags, handling, and disposing of trash. Wash hands after handling or disposing of trash. ? Consider consulting with your local health department about trash disposal guidance if available. Information for Household Members and Caregivers of Someone who is Sick   Call ahead before visiting your doctor   Call ahead: If you have a medical appointment, call the healthcare provider and tell them that you have or may have COVID-19. This will help the healthcare provider's office take steps to keep other people from getting infected or exposed. Wear a facemask if you are sick   ; If you are sick: You should wear a facemask when you are around other people (e.g., sharing a room or vehicle) or pets and before you enter a healthcare provider's office. ; If you are caring for others: If the person who is sick is not able to wear a facemask (for example, because it causes trouble breathing), then people who live with the person who is sick should not stay in the same room with them, or they should wear a facemask if they enter a room with the person who is sick.   Cover your coughs and sneezes ; Cover: Cover your mouth and nose with a tissue when you cough or sneeze.   ; Dispose: Throw used tissues in a lined trash can.   ; Wash hands: Immediately wash your hands with soap and water for at least 20 seconds or, if soap and water are not available, clean your hands with an alcohol-based hand  that contains at least 60% alcohol. Clean your hands often   ; Wash hands: Wash your hands often with soap and water for at least 20 seconds, especially after blowing your nose, coughing, or sneezing; going to the bathroom; and before eating or preparing food.   ; Hand : If soap and water are not readily available, use an alcohol-based hand  with at least 60% alcohol, covering all surfaces of your hands and rubbing them together until they feel dry.   ; Soap and water: Soap and water are the best option if hands are visibly dirty.   ; Avoid touching: Avoid touching your eyes, nose, and mouth with unwashed hands. Handwashing Tips   ; Wet your hands with clean, running water (warm or cold), turn off the tap, and apply soap.  ; Lather your hands by rubbing them together with the soap. Lather the backs of your hands, between your fingers, and under your nails. ; Scrub your hands for at least 20 seconds. Need a timer? Hum the [place] from beginning to end twice.  ; Rinse your hands well under clean, running water.  ; Dry your hands using a clean towel or air dry them. Avoid sharing personal household items   ; Do not share: You should not share dishes, drinking glasses, cups, eating utensils, towels, or bedding with other people or pets in your home.   ; Wash thoroughly after use: After using these items, they should be washed thoroughly with soap and water. Clean all high-touch surfaces everyday   ; Clean and disinfect: Practice routine cleaning of high touch surfaces. ; High touch surfaces include counters, tabletops, doorknobs, bathroom fixtures, toilets, phones, keyboards, tablets, and bedside tables.  ; Disinfect areas with bodily fluids: Also, clean any surfaces that may have blood, stool, or body fluids on them.   ; Household : Use a household cleaning spray or wipe, according to the label instructions. Labels contain instructions for safe and effective use of the cleaning product including precautions you should take when applying the product, such as wearing gloves and making sure you have good ventilation during use of the product. Monitor your symptoms   Seek medical attention: Seek prompt medical attention if your illness is worsening     (e.g., difficulty breathing).   ; Call your doctor: Before seeking care, call your healthcare provider and tell them that you have, or are being evaluated for, COVID-19.   ; Wear a facemask when sick: Put on a facemask before you enter the facility. These steps will help the healthcare provider's office to keep other people in the office or waiting room from getting infected or exposed. ; Alert health department: Ask your healthcare provider to call the local or [facility] health department. Persons who are placed under active monitoring or facilitated self-monitoring should follow instructions provided by their local health department or occupational health professionals, as appropriate.  ; Call 911 if you have a medical emergency: If you have a medical emergency and need to call 911, notify the dispatch personnel that you have, or are being evaluated for COVID-19. If possible, put on a facemask before emergency medical services arrive. Patient instructed to return to the office if symptoms worsen, return, or have any other concerns. Patient understands and is agreeable.          Antonio Wang PA-C 1/4/2021 11:51 AM

## 2021-01-04 NOTE — LETTER
[address] [place] [address]  [place] [address]  Phone: [phone]  Fax: [phone]    Manuel Galeano PA-C        January 4, 2021     Patient: Lisa Vora   YOB: 1995   Date of Visit: 1/4/2021       To Whom it May Concern:    Bel Mcclendon was seen in my clinic on 1/4/2021. He is to remain off work until his [place] comes back negative    If you have any questions or concerns, please don't hesitate to call.     Sincerely,         Manuel Galeano PA-C

## 2021-01-04 NOTE — PATIENT INSTRUCTIONS
· Eat soft solids and drink plenty of clear liquids. Flavored ice pops, ice cream, scrambled eggs, sherbet, and gelatin dessert (such as Jell-O) may also soothe the throat. · Get lots of rest.  · Do not smoke, and avoid secondhand smoke. If you need help quitting, talk to your doctor about stop-smoking programs and medicines. These can increase your chances of quitting for good. · Use a vaporizer or humidifier to add moisture to the air in your bedroom. Follow the directions for cleaning the machine. When should you call for help? Call your doctor now or seek immediate medical care if:    · You have a new or higher fever.     · You have a fever with a stiff neck or severe headache.     · You have new or worse trouble swallowing.     · Your sore throat gets much worse on one side.     · Your pain becomes much worse on one side of your throat. Watch closely for changes in your health, and be sure to contact your doctor if:    · You are not getting better after 2 days (48 hours).     · You do not get better as expected. Where can you learn more? Go to https://CPUsage.Villij. org and sign in to your [employer] account. Enter K625 in the Kyleswutabout box to learn more about \"Strep Throat: Care Instructions. \"     If you do not have an account, please click on the \"Sign Up Now\" link. Current as of: April 15, 2020               Content Version: 12.6  © 1727-5347 "[employer]. Care instructions adapted under license by [facility] [place] ([facility]). If you have questions about a medical condition or this instruction, always ask your healthcare professional. [place] [address] any warranty or liability for your use of this information. Patient Education        Learning About Coronavirus [phone])  Coronavirus [phone]): Overview  What is coronavirus ([id number]-97)? The coronavirus disease (COVID-19) is caused by a virus. It is an illness that was first found in December 2019. It has since spread worldwide. The virus can cause fever, cough, and trouble breathing. In severe cases, it can cause pneumonia and make it hard to breathe without help. It can cause death. This virus spreads person-to-person through droplets from coughing and sneezing. It can also spread when you are close to someone who is infected. And it can spread when you touch something that has the virus on it, such as a doorknob or a tabletop. Coronaviruses are a large group of viruses. They cause the common cold. They also cause more serious illnesses like Middle East respiratory syndrome (MERS) and severe acute respiratory syndrome (SARS). COVID-19 is caused by a novel coronavirus. That means it's a new type that has not been seen in people before. How is COVID-19 treated? Mild illness can be treated at home, but more serious illness needs to be treated in the hospital. Treatment may include medicines to reduce symptoms, plus breathing support such as oxygen therapy or a ventilator. Other treatments, such as antiviral medicines, may help people who have COVID-19. What can you do to protect yourself from COVID-19? The best way to protect yourself from getting sick is to:  · Avoid areas where there is an outbreak. · Avoid contact with people who may be infected. · Avoid crowds and try to stay at least 6 feet away from other people. · Wash your hands often, especially after you cough or sneeze. Use soap and water, and scrub for at least 20 seconds. If soap and water aren't available, use an alcohol-based hand . · Avoid touching your mouth, nose, and eyes. What can you do to avoid spreading the virus to others? To help avoid spreading the virus to others:  · Wash your hands often with soap or alcohol-based hand sanitizers. · Cover your mouth with a tissue when you cough or sneeze. Then throw the tissue in the trash. · Use a disinfectant to clean things that you touch often. These include doorknobs, remote controls, phones, and handles on your refrigerator and microwave. And don't forget countertops, tabletops, bathrooms, and computer keyboards. · Wear a cloth face cover if you have to go to public areas. If you know or suspect that you have COVID-19:  · Stay home. Don't go to school, work, or public areas. And don't use public transportation, ride-shares, or taxis unless you have no choice. · Leave your home only if you need to get medical care or testing. But call the doctor's office first so they know you're coming. And wear a face cover. · Limit contact with people in your home. If possible, stay in a separate bedroom and use a separate bathroom. · Wear a face cover whenever you're around other people. It can help stop the spread of the virus when you cough or sneeze. · Clean and disinfect your home every day. Use household  and disinfectant wipes or sprays. Take special care to clean things that you grab with your hands. · Self-isolate until it's safe to be around others again. ? If you have symptoms, it's safe when you haven't had a fever for 3 days and your symptoms have improved and it's been at least 10 days since your symptoms started. ? If you were exposed to the virus but don't have symptoms, it's safe to be around others 14 days after exposure. ? Talk to your doctor about whether you also need testing, especially if you have a weakened immune system. When to call for help  Call 911 anytime you think you may need emergency care. For example, call if:  · You have severe trouble breathing. (You can't talk at all.)  · You have constant chest pain or pressure. · You are severely dizzy or lightheaded. · You are confused or can't think clearly. · Your face and lips have a blue color. · You passed out (lost consciousness) or are very hard to wake up. Call your doctor now if you develop symptoms such as:  · Shortness of breath. · Fever. · Cough. If you need to get care, call ahead to the doctor's office for instructions before you go. Make sure you wear a face cover to prevent exposing other people to the virus. Where can you get the latest information? The following health organizations are tracking and studying this virus. Their websites contain the most up-to-date information. Mehreen Schwartz also learn what to do if you think you may have been exposed to the virus. · U.S. Centers for Disease Control and Prevention (CDC): The CDC provides updated news about the disease and travel advice. The website also tells you how to prevent the spread of infection. www.cdc.gov  · World Health Organization [facility]): WHO offers information about the virus outbreaks. WHO also has travel advice. www.who.int  Current as of: July 10, 2020               Content Version: 12.6  © 2006-2020 [employer]. Care instructions adapted under license by [facility] ([facility]). If you have questions about a medical condition or this instruction, always ask your healthcare professional. [place] [address] any warranty or liability for your use of this information. Patient Education        Coronavirus (GMDHA-75): Care Instructions  Overview  The coronavirus disease (COVID-19) is caused by a virus. Symptoms may include a fever, a cough, and shortness of breath. It mainly spreads person-to-person through droplets from coughing and sneezing. The virus also can spread when people are in close contact with someone who is infected. Most people have mild symptoms and can take care of themselves at home. If their symptoms get worse, they may need care in a hospital. Treatment may include medicines to reduce symptoms, plus breathing support such as oxygen therapy or a ventilator. It's important to not spread the virus to others. If you have COVID-19, wear a face cover anytime you are around other people. You need to isolate yourself while you are sick. Leave your home only if you need to get medical care or testing. Follow-up care is a key part of your treatment and safety. Be sure to make and go to all appointments, and call your doctor if you are having problems. It's also a good idea to know your test results and keep a list of the medicines you take. How can you care for yourself at home? · Get extra rest. It can help you feel better. · Drink plenty of fluids. This helps replace fluids lost from fever. Fluids also help ease a scratchy throat. Water, soup, fruit juice, and hot tea with lemon are good choices. · Take acetaminophen (such as Tylenol) to reduce a fever. It may also help with muscle aches. Read and follow all instructions on the label. · Use petroleum jelly on sore skin. This can help if the skin around your nose and lips becomes sore from rubbing a lot with tissues. Tips for self-isolation  · Limit contact with people in your home. If possible, stay in a separate bedroom and use a separate bathroom. · Wear a cloth face cover when you are around other people. It can help stop the spread of the virus when you cough or sneeze. · If you have to leave home, avoid crowds and try to stay at least 6 feet away from other people. · Avoid contact with pets and other animals. · Cover your mouth and nose with a tissue when you cough or sneeze. Then throw it in the trash right away. · Wash your hands often, especially after you cough or sneeze. Use soap and water, and scrub for at least 20 seconds. If soap and water aren't available, use an alcohol-based hand . · Don't share personal household items. These include bedding, towels, cups and glasses, and eating utensils.

## 2021-01-05 LAB
SARS-COV-2, RAPID: NORMAL
SARS-COV-2: NORMAL
SARS-COV-2: NOT DETECTED
SOURCE: NORMAL

## 2021-01-07 ENCOUNTER — TELEPHONE (OUTPATIENT)
Dept: FAMILY MEDICINE CLINIC | Age: 26
End: 2021-01-07

## 2021-01-07 RX ORDER — CEPHALEXIN 500 MG/1
500 CAPSULE ORAL 2 TIMES DAILY
Qty: 14 CAPSULE | Refills: 0 | Status: SHIPPED | OUTPATIENT
Start: 2021-01-07 | End: 2021-01-14

## 2021-01-07 NOTE — TELEPHONE ENCOUNTER
Patient states that his throat it much worse than it was on Monday. Patient is asking if something else can be sent instead.      Please advise

## 2021-03-22 DIAGNOSIS — K21.9 GASTROESOPHAGEAL REFLUX DISEASE: ICD-10-CM

## 2021-03-23 RX ORDER — CIMETIDINE 800 MG
800 TABLET ORAL 2 TIMES DAILY
Qty: 90 TABLET | Refills: 3 | Status: SHIPPED | OUTPATIENT
Start: 2021-03-23 | End: 2022-04-19 | Stop reason: SDUPTHER

## 2021-03-24 RX ORDER — ONDANSETRON 8 MG/1
TABLET, ORALLY DISINTEGRATING ORAL
COMMUNITY
Start: 2021-01-19 | End: 2022-04-08

## 2021-03-24 RX ORDER — SYRINGE WITH NEEDLE, 1 ML 25GX5/8"
SYRINGE, EMPTY DISPOSABLE MISCELLANEOUS
COMMUNITY
Start: 2021-03-16

## 2021-03-24 RX ORDER — OXYCODONE HYDROCHLORIDE AND ACETAMINOPHEN 5; 325 MG/1; MG/1
TABLET ORAL
COMMUNITY
Start: 2021-01-19 | End: 2022-03-08 | Stop reason: ALTCHOICE

## 2021-09-19 ENCOUNTER — HOSPITAL ENCOUNTER (OUTPATIENT)
Age: 26
Setting detail: SPECIMEN
Discharge: HOME | End: 2021-09-19
Payer: COMMERCIAL

## 2021-09-19 ENCOUNTER — OFFICE VISIT (OUTPATIENT)
Dept: PRIMARY CARE CLINIC | Age: 26
End: 2021-09-19
Payer: COMMERCIAL

## 2021-09-19 VITALS
SYSTOLIC BLOOD PRESSURE: 111 MMHG | WEIGHT: 185 LBS | TEMPERATURE: 98.4 F | DIASTOLIC BLOOD PRESSURE: 72 MMHG | OXYGEN SATURATION: 95 % | HEART RATE: 74 BPM | BODY MASS INDEX: 34.04 KG/M2 | HEIGHT: 62 IN

## 2021-09-19 DIAGNOSIS — J01.90 ACUTE BACTERIAL SINUSITIS: Primary | ICD-10-CM

## 2021-09-19 DIAGNOSIS — J02.9 SORE THROAT: ICD-10-CM

## 2021-09-19 DIAGNOSIS — B96.89 ACUTE BACTERIAL SINUSITIS: Primary | ICD-10-CM

## 2021-09-19 LAB — S PYO AG THROAT QL: NORMAL

## 2021-09-19 PROCEDURE — G8417 CALC BMI ABV UP PARAM F/U: HCPCS | Performed by: NURSE PRACTITIONER

## 2021-09-19 PROCEDURE — 87880 STREP A ASSAY W/OPTIC: CPT | Performed by: NURSE PRACTITIONER

## 2021-09-19 PROCEDURE — 1036F TOBACCO NON-USER: CPT | Performed by: NURSE PRACTITIONER

## 2021-09-19 PROCEDURE — G8427 DOCREV CUR MEDS BY ELIG CLIN: HCPCS | Performed by: NURSE PRACTITIONER

## 2021-09-19 PROCEDURE — 99213 OFFICE O/P EST LOW 20 MIN: CPT | Performed by: NURSE PRACTITIONER

## 2021-09-19 RX ORDER — PREDNISONE 20 MG/1
40 TABLET ORAL DAILY
Qty: 10 TABLET | Refills: 0 | Status: SHIPPED | OUTPATIENT
Start: 2021-09-19 | End: 2021-09-24

## 2021-09-19 RX ORDER — AMOXICILLIN AND CLAVULANATE POTASSIUM 875; 125 MG/1; MG/1
1 TABLET, FILM COATED ORAL 2 TIMES DAILY
Qty: 20 TABLET | Refills: 0 | Status: SHIPPED | OUTPATIENT
Start: 2021-09-19 | End: 2021-09-29

## 2021-09-19 SDOH — ECONOMIC STABILITY: FOOD INSECURITY: WITHIN THE PAST 12 MONTHS, YOU WORRIED THAT YOUR FOOD WOULD RUN OUT BEFORE YOU GOT MONEY TO BUY MORE.: PATIENT DECLINED

## 2021-09-19 SDOH — ECONOMIC STABILITY: FOOD INSECURITY: WITHIN THE PAST 12 MONTHS, THE FOOD YOU BOUGHT JUST DIDN'T LAST AND YOU DIDN'T HAVE MONEY TO GET MORE.: PATIENT DECLINED

## 2021-09-19 ASSESSMENT — ENCOUNTER SYMPTOMS
WHEEZING: 0
SHORTNESS OF BREATH: 1
SORE THROAT: 1
SINUS PRESSURE: 0
EYE REDNESS: 0
CHEST TIGHTNESS: 0
EYE DISCHARGE: 0
COUGH: 1
VOICE CHANGE: 0

## 2021-09-19 ASSESSMENT — SOCIAL DETERMINANTS OF HEALTH (SDOH): HOW HARD IS IT FOR YOU TO PAY FOR THE VERY BASICS LIKE FOOD, HOUSING, MEDICAL CARE, AND HEATING?: PATIENT DECLINED

## 2021-09-19 NOTE — PROGRESS NOTES
[facility] [place]  [address] [place] [address]  Dept: [phone]  Dept Fax: [phone]     Jessica Ahumada is a 23 y.o. adult who presents to the urgent care today for his medicalconditions/complaints as noted below. Jessica Ahumada is c/o of Pharyngitis (started tuesday ), Shortness of Breath (started last night ), Cough, Headache, and Generalized Body Aches (worse on Monday )    HPI:      Pharyngitis  This is a new problem. Episode onset: 1 week ago. The problem has been gradually worsening. Associated symptoms include congestion, coughing, fatigue, headaches and a sore throat. Pertinent negatives include no chest pain, chills, fever, myalgias, rash or weakness. The symptoms are aggravated by drinking, eating and swallowing. Treatments tried: otc tx. The treatment provided no relief. Was at a music festival with a friend, friend is also ill with unknown illness.     Past Medical History:   Diagnosis Date    Depression       Current Outpatient Medications   Medication Sig Dispense Refill    amoxicillin-clavulanate (AUGMENTIN) 875-125 MG per tablet Take 1 tablet by mouth 2 times daily for 10 days 20 tablet 0    predniSONE (DELTASONE) 20 MG tablet Take 2 tablets by mouth daily for 5 days 10 tablet 0    ondansetron (ZOFRAN-ODT) 8 MG TBDP disintegrating tablet DISSOLVE 1 TABLET ON THE TONGUE EVERY 8 HOURS      oxyCODONE-acetaminophen (PERCOCET) 5-325 MG per tablet TAKE 1 TABLET BY MOUTH EVERY 6 HOURS AS NEEDED FOR PAIN      B-D 3CC LUER-LENA SYR 80RT5-0/2 22G X 1-1/2\" 3 ML MISC       busPIRone (BUSPAR) 10 MG tablet 20 mg daily       vitamin D (ERGOCALCIFEROL) 1.25 MG (83616 UT) CAPS capsule       BD DISP NEEDLE 23G X 1\" MISC       testosterone cypionate (DEPOTESTOTERONE CYPIONATE) 200 MG/ML injection       loratadine (CLARITIN) 10 MG tablet Claritin TABS   Refills: 0    Active      levothyroxine (SYNTHROID) 50 MCG tablet Take 50 mcg by mouth Daily      Ferrous Sulfate (IRON PO) Take by mouth      emtricitabine-tenofovir alafenamide (DESCOVY) 200-25 MG TABS tablet Take 1 tablet by mouth daily      atorvastatin (LIPITOR) 10 MG tablet TAKE 1 TABLET BY MOUTH ONE TIME A DAY  3    testosterone cypionate (DEPOTESTOTERONE CYPIONATE) 100 MG/ML injection Inject 100 mg into the muscle once. TAKES 0.2ML .  vilazodone HCl (VIIBRYD) 20 MG TABS Take 40 mg by mouth      cimetidine (TAGAMET) 800 MG tablet Take 1 tablet by mouth 2 times daily 90 tablet 3     No current facility-administered medications for this visit. Allergies   Allergen Reactions    Bupropion      Unknown Reaction. Reviewed PMH, SH, and FH with the patient and updated. Subjective:      Review of Systems   Constitutional: Positive for fatigue. Negative for chills and fever. HENT: Positive for congestion and sore throat. Negative for ear discharge, ear pain, postnasal drip, sinus pressure, sneezing and voice change. Eyes: Negative for discharge and redness. Respiratory: Positive for cough and shortness of breath. Negative for chest tightness and wheezing. Cardiovascular: Negative. Negative for chest pain. Musculoskeletal: Negative for myalgias. Skin: Negative for rash. Neurological: Positive for headaches. Negative for dizziness, weakness and light-headedness. Hematological: Negative for adenopathy. All other systems reviewed and are negative. Objective:      Physical Exam  Vitals and nursing note reviewed. Constitutional:       General: He is not in acute distress. Appearance: Normal appearance. He is well-developed. He is not ill-appearing, toxic-appearing or diaphoretic. HENT:      Head: Normocephalic. Right Ear: Tympanic membrane and external ear normal.      Left Ear: Tympanic membrane and external ear normal.      Nose: Congestion present. Right Sinus: No maxillary sinus tenderness or frontal sinus tenderness.       Left Sinus: No maxillary sinus tenderness or frontal sinus tenderness. Mouth/Throat:      Pharynx: Oropharyngeal exudate (PND) and posterior oropharyngeal erythema present. Eyes:      General:         Right eye: No discharge. Left eye: No discharge. Cardiovascular:      Rate and Rhythm: Normal rate and regular rhythm. Heart sounds: Normal heart sounds. No murmur heard. Pulmonary:      Effort: Pulmonary effort is normal. No respiratory distress. Breath sounds: Normal breath sounds. No wheezing or rales. Lymphadenopathy:      Cervical: No cervical adenopathy. Skin:     General: Skin is warm. Findings: No rash. Neurological:      Mental Status: He is alert. /72   Pulse 74   Temp 98.4 °F (36.9 °C) (Temporal)   Ht 5' 2\" (1.575 m)   Wt 185 lb (83.9 kg)   SpO2 95%   BMI 33.84 kg/m²     Assessment:       Diagnosis Orders   1. Acute bacterial sinusitis  amoxicillin-clavulanate (AUGMENTIN) 875-125 MG per tablet    predniSONE (DELTASONE) 20 MG tablet   2. Sore throat  POCT rapid strep A    COVID-19     Plan:      Based on the duration and severity of the symptoms-- I will treat this as bacterial at this time. Patient instructed to complete antibiotic prescription fully. Prednisone sent to the pharmacy to help enable symptom relief. Will send out COVID19 testing. Possible treatment alterations based on the results. Patient instructed to self-quarantine until testing results are back. Patient instructed not to return to work until results are back. Tylenol as needed for fever/pain. Encouraged adequate hydration and rest.  The patient indicates understanding of these issues and agrees with the plan. Educational materials provided on AVS.  Follow up if symptoms do not improve/worsen. Discussed symptoms that will warrant urgent ED evaluation/treatment.     Orders Placed This Encounter   Medications    amoxicillin-clavulanate (AUGMENTIN) 875-125 MG per tablet     Sig: Take 1 tablet by mouth 2 times daily for 10 days     Dispense:  20 tablet     Refill:  0    predniSONE (DELTASONE) 20 MG tablet     Sig: Take 2 tablets by mouth daily for 5 days     Dispense:  10 tablet     Refill:  0        Patient given educational materials - see patient instructions. Discussed use, benefit, and side effects of prescribed medications. All patientquestions answered. Pt voiced understanding.     Electronically signed by IONA Bolivar CNP on 9/19/2021at 3:54 PM

## 2021-09-19 NOTE — PATIENT INSTRUCTIONS
Patient Education        Sinusitis: Care Instructions  Your Care Instructions     Sinusitis is an infection of the lining of the sinus cavities in your head. Sinusitis often follows a cold. It causes pain and pressure in your head and face. In most cases, sinusitis gets better on its own in 1 to 2 weeks. But some mild symptoms may last for several weeks. Sometimes antibiotics are needed. Follow-up care is a key part of your treatment and safety. Be sure to make and go to all appointments, and call your doctor if you are having problems. It's also a good idea to know your test results and keep a list of the medicines you take. How can you care for yourself at home? · Take an over-the-counter pain medicine, such as acetaminophen (Tylenol), ibuprofen (Advil, Motrin), or naproxen (Aleve). Read and follow all instructions on the label. · If the doctor prescribed antibiotics, take them as directed. Do not stop taking them just because you feel better. You need to take the full course of antibiotics. · Be careful when taking over-the-counter cold or flu medicines and Tylenol at the same time. Many of these medicines have acetaminophen, which is Tylenol. Read the labels to make sure that you are not taking more than the recommended dose. Too much acetaminophen (Tylenol) can be harmful. · Breathe warm, moist air from a steamy shower, a hot bath, or a sink filled with hot water. Avoid cold, dry air. Using a humidifier in your home may help. Follow the directions for cleaning the machine. · Use saline (saltwater) nasal washes. This can help keep your nasal passages open and wash out mucus and bacteria. You can buy saline nose drops at a grocery store or drugstore. Or you can make your own at home by adding 1 teaspoon of salt and 1 teaspoon of baking soda to 2 cups of distilled water. If you make your own, fill a bulb syringe with the solution, insert the tip into your nostril, and squeeze gently.  Vicky Fan your nose.  · Put a hot, wet towel or a warm gel pack on your face 3 or 4 times a day for 5 to 10 minutes each time. · Try a decongestant nasal spray like oxymetazoline (Afrin). Do not use it for more than 3 days in a row. Using it for more than 3 days can make your congestion worse. When should you call for help? Call your doctor now or seek immediate medical care if:    · You have new or worse swelling or redness in your face or around your eyes.     · You have a new or higher fever. Watch closely for changes in your health, and be sure to contact your doctor if:    · You have new or worse facial pain.     · The mucus from your nose becomes thicker (like pus) or has new blood in it.     · You are not getting better as expected. Where can you learn more? Go to https://Wifi OnlinepeInSequent.CoupOption. org and sign in to your [employer] account. Enter U446 in the [place] box to learn more about \"Sinusitis: Care Instructions. \"     If you do not have an account, please click on the \"Sign Up Now\" link. Current as of: December 2, 2020               Content Version: 12.9  © 2006-2021 Healthwise, [facility]. Care instructions adapted under license by [facility] [place] ([facility]). If you have questions about a medical condition or this instruction, always ask your healthcare professional. [place] [address] any warranty or liability for your use of this information.

## 2021-09-20 LAB
SARS-COV-2: NORMAL
SARS-COV-2: NOT DETECTED
SOURCE: NORMAL

## 2021-10-22 ENCOUNTER — OFFICE VISIT (OUTPATIENT)
Dept: FAMILY MEDICINE CLINIC | Age: 26
End: 2021-10-22
Payer: COMMERCIAL

## 2021-10-22 VITALS
OXYGEN SATURATION: 100 % | HEIGHT: 62 IN | SYSTOLIC BLOOD PRESSURE: 117 MMHG | HEART RATE: 72 BPM | TEMPERATURE: 97.2 F | DIASTOLIC BLOOD PRESSURE: 73 MMHG | WEIGHT: 188.6 LBS | BODY MASS INDEX: 34.71 KG/M2

## 2021-10-22 DIAGNOSIS — Z00.00 ENCOUNTER FOR WELL ADULT EXAM WITHOUT ABNORMAL FINDINGS: Primary | ICD-10-CM

## 2021-10-22 PROCEDURE — G8484 FLU IMMUNIZE NO ADMIN: HCPCS | Performed by: FAMILY MEDICINE

## 2021-10-22 PROCEDURE — 99395 PREV VISIT EST AGE 18-39: CPT | Performed by: FAMILY MEDICINE

## 2021-10-22 ASSESSMENT — PATIENT HEALTH QUESTIONNAIRE - PHQ9
SUM OF ALL RESPONSES TO PHQ9 QUESTIONS 1 & 2: 1
SUM OF ALL RESPONSES TO PHQ QUESTIONS 1-9: 1
SUM OF ALL RESPONSES TO PHQ QUESTIONS 1-9: 1
1. LITTLE INTEREST OR PLEASURE IN DOING THINGS: 0
2. FEELING DOWN, DEPRESSED OR HOPELESS: 1
SUM OF ALL RESPONSES TO PHQ QUESTIONS 1-9: 1

## 2021-10-22 NOTE — PATIENT INSTRUCTIONS
Body Mass Index: Care Instructions  Your Care Instructions     Body mass index (BMI) can help you see if your weight is raising your risk for health problems. It uses a formula to compare how much you weigh with how tall you are. · A BMI lower than 18.5 is considered underweight. · A BMI between 18.5 and 24.9 is considered healthy. · A BMI between 25 and 29.9 is considered overweight. A BMI of 30 or higher is considered obese. If your BMI is in the normal range, it means that you have a lower risk for weight-related health problems. If your BMI is in the overweight or obese range, you may be at increased risk for weight-related health problems, such as high blood pressure, heart disease, stroke, arthritis or joint pain, and diabetes. If your BMI is in the underweight range, you may be at increased risk for health problems such as fatigue, lower protection (immunity) against illness, muscle loss, bone loss, hair loss, and hormone problems. BMI is just one measure of your risk for weight-related health problems. You may be at higher risk for health problems if you are not active, you eat an unhealthy diet, or you drink too much alcohol or use tobacco products. Follow-up care is a key part of your treatment and safety. Be sure to make and go to all appointments, and call your doctor if you are having problems. It's also a good idea to know your test results and keep a list of the medicines you take. How can you care for yourself at home? · Practice healthy eating habits. This includes eating plenty of fruits, vegetables, whole grains, lean protein, and low-fat dairy. · If your doctor recommends it, get more exercise. Walking is a good choice. Bit by bit, increase the amount you walk every day. Try for at least 30 minutes on most days of the week. · Do not smoke. Smoking can increase your risk for health problems. If you need help quitting, talk to your doctor about stop-smoking programs and medicines. These can increase your chances of quitting for good. · Limit alcohol to 2 drinks a day for men and 1 drink a day for women. Too much alcohol can cause health problems. If you have a BMI higher than 25  · Your doctor may do other tests to check your risk for weight-related health problems. This may include measuring the distance around your waist. A waist measurement of more than 40 inches in men or 35 inches in women can increase the risk of weight-related health problems. · Talk with your doctor about steps you can take to stay healthy or improve your health. You may need to make lifestyle changes to lose weight and stay healthy, such as changing your diet and getting regular exercise. If you have a BMI lower than 18.5  · Your doctor may do other tests to check your risk for health problems. · Talk with your doctor about steps you can take to stay healthy or improve your health. You may need to make lifestyle changes to gain or maintain weight and stay healthy, such as getting more healthy foods in your diet and doing exercises to build muscle. Where can you learn more? Go to https://LEID ProductsneftaliLiveStories.Nuforce. org and sign in to your [employer] account. Enter S176 in the [employer] box to learn more about \"Body Mass Index: Care Instructions. \"     If you do not have an account, please click on the \"Sign Up Now\" link. Current as of: March 17, 2021               Content Version: 13.0  © 9888-7386 Healthwise, Incorporated. Care instructions adapted under license by [facility] [place] ([facility]). If you have questions about a medical condition or this instruction, always ask your healthcare professional. [place] [address] any warranty or liability for your use of this information. Eating Healthy Foods: Care Instructions  Your Care Instructions     Eating healthy foods can help lower your risk for disease.  Healthy food gives you energy and keeps your heart strong, your brain active, your muscles working, and your bones strong. A healthy diet includes a variety of foods from the basic food groups: grains, vegetables, fruits, milk and milk products, and meat and beans. Some people may eat more of their favorite foods from only one food group and, as a result, miss getting the nutrients they need. So, it is important to pay attention not only to what you eat but also to what you are missing from your diet. You can eat a healthy, balanced diet by making a few small changes. Follow-up care is a key part of your treatment and safety. Be sure to make and go to all appointments, and call your doctor if you are having problems. It's also a good idea to know your test results and keep a list of the medicines you take. How can you care for yourself at home? Look at what you eat  · Keep a food diary for a week or two and record everything you eat or drink. Track the number of servings you eat from each food group. · For a balanced diet every day, eat a variety of:  ? 6 or more ounce-equivalents of grains, such as cereals, breads, crackers, rice, or pasta, every day. An ounce-equivalent is 1 slice of bread, 1 cup of ready-to-eat cereal, or ½ cup of cooked rice, cooked pasta, or cooked cereal.  ? 2½ cups of vegetables, especially:  § Dark-green vegetables such as broccoli and spinach. § Orange vegetables such as carrots and sweet potatoes. § Dry beans (such as dumont and kidney beans) and peas (such as lentils). ? 2 cups of fresh, frozen, or canned fruit. A small apple or 1 banana or orange equals 1 cup. ? 3 cups of nonfat or low-fat milk, yogurt, or other milk products. ? 5½ ounces of meat and beans, such as chicken, fish, lean meat, beans, nuts, and seeds. One egg, 1 tablespoon of peanut butter, ½ ounce nuts or seeds, or ¼ cup of cooked beans equals 1 ounce of meat. · Learn how to read food labels for serving sizes and ingredients.  Fast-food and convenience-food meals often contain few or no fruits or vegetables. Make sure you eat some fruits and vegetables to make the meal more nutritious. · Look at your food diary. For each food group, add up what you have eaten and then divide the total by the number of days. This will give you an idea of how much you are eating from each food group. See if you can find some ways to change your diet to make it more healthy. Start small  · Do not try to make dramatic changes to your diet all at once. You might feel that you are missing out on your favorite foods and then be more likely to fail. · Start slowly, and gradually change your habits. Try some of the following:  ? Use whole wheat bread instead of white bread. ? Use nonfat or low-fat milk instead of whole milk. ? Eat brown rice instead of white rice, and eat whole wheat pasta instead of white-flour pasta. ? Try low-fat cheeses and low-fat yogurt. ? Add more fruits and vegetables to meals and have them for snacks. ? Add lettuce, tomato, cucumber, and onion to sandwiches. ? Add fruit to yogurt and cereal.  Enjoy food  · You can still eat your favorite foods. You just may need to eat less of them. If your favorite foods are high in fat, salt, and sugar, limit how often you eat them, but do not cut them out entirely. · Eat a wide variety of foods. Make healthy choices when eating out  · The type of restaurant you choose can help you make healthy choices. Even fast-food chains are now offering more low-fat or healthier choices on the menu. · Choose smaller portions, or take half of your meal home. · When eating out, try:  ? A veggie pizza with a whole wheat crust or grilled chicken (instead of sausage or pepperoni). ? Pasta with roasted vegetables, grilled chicken, or marinara sauce instead of cream sauce. ? A vegetable wrap or grilled chicken wrap. ? Broiled or poached food instead of fried or breaded items.   Make healthy choices easy  · Buy packaged, prewashed, ready-to-eat fresh vegetables and fruits, such

## 2021-10-22 NOTE — PROGRESS NOTES
(VIIBRYD) 20 MG TABS Take 40 mg by mouth Yes Historical Provider, MD   ondansetron (ZOFRAN-ODT) 8 MG TBDP disintegrating tablet DISSOLVE 1 TABLET ON THE TONGUE EVERY 8 HOURS  Patient not taking: Reported on 10/22/2021  Historical Provider, MD   oxyCODONE-acetaminophen (PERCOCET) 5-325 MG per tablet TAKE 1 TABLET BY MOUTH EVERY 6 HOURS AS NEEDED FOR PAIN  Patient not taking: Reported on 10/22/2021  Historical Provider, MD   cimetidine (TAGAMET) 800 MG tablet Take 1 tablet by mouth 2 times daily  Marta Porras MD         Past Medical History:   Diagnosis Date    Depression        Past Surgical History:   Procedure Laterality Date    WISDOM TOOTH EXTRACTION         No family history on file. Social History     Tobacco Use    Smoking status: Never Smoker    Smokeless tobacco: Never Used   Substance Use Topics    Alcohol use: No    Drug use: No       Objective   /73   Pulse 72   Temp 97.2 °F (36.2 °C)   Ht 5' 2\" (1.575 m)   Wt 188 lb 9.6 oz (85.5 kg)   SpO2 100%   BMI 34.50 kg/m²   Wt Readings from Last 3 Encounters:   10/22/21 188 lb 9.6 oz (85.5 kg)   09/19/21 185 lb (83.9 kg)   08/10/20 181 lb 9.6 oz (82.4 kg)       Physical Exam   HENT:   /73   Pulse 72   Temp 97.2 °F (36.2 °C)   Ht 5' 2\" (1.575 m)   Wt 188 lb 9.6 oz (85.5 kg)   SpO2 100%   BMI 34.50 kg/m²   Constitutional: Alert and oriented. Well-nourished. Head: Normocephalic and atraumatic. Right Ear: External ear normal. TM: no bulging, erythema or fluid seen. Left Ear: External ear normal. TM: no bulging, erythema or fluid seen. Nose: Nose normal.   Mouth/Throat: Oropharynx is clear and moist.  Teeth in good repair. Eyes: Pupils are equal, round, and reactive to light. Right eye exhibits no discharge. Left eye exhibits no discharge. No scleral icterus. Glasses present. Neck: Normal range of motion. Neck supple. No JVD present. No tracheal deviation present. No thyromegaly present.    Cardiovascular: Normal rate, regular rhythm, normal heart sounds. Pulmonary/Chest: Effort normal and breath sounds normal. No respiratory distress. He has no wheezes. He has no rales. Abdominal: Soft. Bowel sounds are normal.  He exhibits no distension and no mass. There is no tenderness. There is no rebound and no guarding. Musculoskeletal: Normal range of motion. He exhibits no edema or tenderness. Lymphadenopathy:    He has no cervical adenopathy. Neurological:  He is alert and oriented to person, place, and time. Cranial nerves grossly intact. No sensation problem noted. Muscle strength 5/5 throughout. Skin: Skin is warm and dry. No rash noted. No erythema. Psychiatric:  He has a normal mood and affect. Behavior is normal.    Shivani Marcelino was seen today for annual exam.    Diagnoses and all orders for this visit:    Encounter for well adult exam without abnormal findings    The patient really does not have any concerns. He will get the blood work done with his endocrinologist.  He will discuss the chest pain with the endocrinologist.  Call for any changes. Go to the ER chest pain gets worse. Call or return to clinic prn if these symptoms worsen or fail to improve as anticipated. I have reviewed the instructions with the patient, answering all questions to his satisfaction.       Personalized Preventive Plan   Current Health Maintenance Status  Immunization History   Administered Date(s) Administered    COVID-19, J&J, PF, 0.5 mL 03/08/2021    Influenza Vaccine, unspecified formulation 02/02/2018    Influenza Virus Vaccine 10/31/2019        Health Maintenance   Topic Date Due    Hepatitis C screen  Never done    Varicella vaccine (1 of 2 - 2-dose childhood series) Never done    HPV vaccine (1 - 2-dose series) Never done    HIV screen  Never done    DTaP/Tdap/Td vaccine (1 - Tdap) Never done    Lipid screen  06/29/2017    Flu vaccine (1) 09/01/2021    COVID-19 Vaccine  Completed    Hepatitis A vaccine  Aged Out  Hepatitis B vaccine  Aged Out    Hib vaccine  Aged Out    Meningococcal (ACWY) vaccine  Aged Out    Pneumococcal 0-64 years Vaccine  Aged Out     Recommendations for [employer] Due: see orders and patient instructions/AVS.  .    (Please note that portions of this note were completed with a voice recognition program. Efforts were made to edit the dictations but occasionally words are mis-transcribed.)

## 2022-03-08 ENCOUNTER — OFFICE VISIT (OUTPATIENT)
Dept: FAMILY MEDICINE CLINIC | Age: 27
End: 2022-03-08
Payer: COMMERCIAL

## 2022-03-08 VITALS
TEMPERATURE: 99.3 F | BODY MASS INDEX: 35.33 KG/M2 | WEIGHT: 192 LBS | HEART RATE: 92 BPM | OXYGEN SATURATION: 97 % | HEIGHT: 62 IN | SYSTOLIC BLOOD PRESSURE: 124 MMHG | DIASTOLIC BLOOD PRESSURE: 84 MMHG

## 2022-03-08 DIAGNOSIS — K21.9 GASTROESOPHAGEAL REFLUX DISEASE WITHOUT ESOPHAGITIS: ICD-10-CM

## 2022-03-08 DIAGNOSIS — Z79.899 ON STATIN THERAPY: ICD-10-CM

## 2022-03-08 DIAGNOSIS — L30.9 DERMATITIS: ICD-10-CM

## 2022-03-08 DIAGNOSIS — E78.5 HYPERLIPIDEMIA, UNSPECIFIED HYPERLIPIDEMIA TYPE: ICD-10-CM

## 2022-03-08 DIAGNOSIS — Z01.419 ENCOUNTER FOR GYNECOLOGICAL EXAMINATION: ICD-10-CM

## 2022-03-08 DIAGNOSIS — E55.9 VITAMIN D DEFICIENCY: ICD-10-CM

## 2022-03-08 DIAGNOSIS — R53.83 OTHER FATIGUE: ICD-10-CM

## 2022-03-08 DIAGNOSIS — J30.9 ALLERGIC RHINITIS, UNSPECIFIED SEASONALITY, UNSPECIFIED TRIGGER: ICD-10-CM

## 2022-03-08 DIAGNOSIS — Q66.50 CONGENITAL PES PLANUS, UNSPECIFIED LATERALITY: ICD-10-CM

## 2022-03-08 DIAGNOSIS — E03.9 HYPOTHYROIDISM, UNSPECIFIED TYPE: ICD-10-CM

## 2022-03-08 DIAGNOSIS — Z76.89 ENCOUNTER TO ESTABLISH CARE WITH NEW DOCTOR: Primary | ICD-10-CM

## 2022-03-08 DIAGNOSIS — R19.7 DIARRHEA, UNSPECIFIED TYPE: ICD-10-CM

## 2022-03-08 PROCEDURE — G8484 FLU IMMUNIZE NO ADMIN: HCPCS | Performed by: INTERNAL MEDICINE

## 2022-03-08 PROCEDURE — G8427 DOCREV CUR MEDS BY ELIG CLIN: HCPCS | Performed by: INTERNAL MEDICINE

## 2022-03-08 PROCEDURE — 99205 OFFICE O/P NEW HI 60 MIN: CPT | Performed by: INTERNAL MEDICINE

## 2022-03-08 PROCEDURE — 1036F TOBACCO NON-USER: CPT | Performed by: INTERNAL MEDICINE

## 2022-03-08 PROCEDURE — G8417 CALC BMI ABV UP PARAM F/U: HCPCS | Performed by: INTERNAL MEDICINE

## 2022-03-08 ASSESSMENT — ENCOUNTER SYMPTOMS
RESPIRATORY NEGATIVE: 1
DIARRHEA: 1
EYES NEGATIVE: 1

## 2022-03-08 NOTE — PROGRESS NOTES
[address]  [address] [facility]  [facility]      Date of Visit:  3/8/2022  Patient Name: Guy Boykin   Patient :  1995     CHIEF COMPLAINT:     Guy Boykin is a 23 y.o. adult who presents today for an general visit to be evaluated for the following condition(s):  Chief Complaint   Patient presents with    Establish Care    GI Problem    Discuss Labs     b12, food allergy, blood type    Fatigue       REVIEW OF SYSTEM      Review of Systems   Constitutional: Positive for fatigue. HENT: Negative. Eyes: Negative. Respiratory: Negative. Cardiovascular: Negative. Gastrointestinal: Positive for diarrhea. Heartburn, refllux   Endocrine: Negative. Genitourinary: Negative. Musculoskeletal: Negative. Pes planus   Skin: Negative. Allergic/Immunologic: Positive for food allergies. Neurological: Negative. Hematological: Negative. Psychiatric/Behavioral: Negative. All other systems reviewed and are negative. HISTORY OF PRESENT ILLNESS     HPI  Patient presents to establish care. Patient also with multiple acute issues to address today. Patient also working with endocrine Dr. Karina Lui for gender change on long term testosterone treatments. Patient is concerned with possible food allergies - some foods seem to make throat feel funny. Patient also with chronic issues of diarrhea and reflux. Patient also wit chronic fatigue. REVIEWED INFORMATION      Allergies   Allergen Reactions    Bupropion      Unknown Reaction.        Patient Active Problem List   Diagnosis    Depression with anxiety    Excessive daytime sleepiness    Flat feet, bilateral    Pain in both feet    Hypothyroidism    Vitamin D deficiency    On statin therapy       Past Medical History:   Diagnosis Date    Depression        Past Surgical History:   Procedure Laterality Date  MASTECTOMY Bilateral 01/20/2021    WISDOM TOOTH EXTRACTION          Social History     Socioeconomic History    Marital status: Single     Spouse name: None    Number of children: None    Years of education: None    Highest education level: None   Occupational History    None   Tobacco Use    Smoking status: Never Smoker    Smokeless tobacco: Never Used   Substance and Sexual Activity    Alcohol use: No    Drug use: No    Sexual activity: None   Other Topics Concern    None   Social History Narrative    None     Social Determinants of Health     Financial Resource Strain: Unknown    Difficulty of Paying Living Expenses: Patient refused   Food Insecurity: Unknown    Worried About Running Out of Food in the Last Year: Patient refused    Ran Out of Food in the Last Year: Patient refused   Transportation Needs:     Lack of Transportation (Medical): Not on file    Lack of Transportation (Non-Medical):  Not on file   Physical Activity:     Days of Exercise per Week: Not on file    Minutes of Exercise per Session: Not on file   Stress:     Feeling of Stress : Not on file   Social Connections:     Frequency of Communication with Friends and Family: Not on file    Frequency of Social Gatherings with Friends and Family: Not on file    Attends Islam Services: Not on file    Active Member of [address] [place] [address] [employer] or Organizations: Not on file    Attends Club or Organization Meetings: Not on file    Marital Status: Not on file   Intimate Partner Violence:     Fear of Current or Ex-Partner: Not on file    Emotionally Abused: Not on file    Physically Abused: Not on file    Sexually Abused: Not on file   Housing Stability:     Unable to Pay for Housing in the Last Year: Not on file    Number of [place] in the Last Year: Not on file    Unstable Housing in the Last Year: Not on file        Family History   Problem Relation Age of Onset    Cancer Mother 48        Cervical    Alcohol Abuse Mother    [facility] Substance Abuse Mother     Alcohol Abuse Father     Substance Abuse Father        PHYSICAL EXAM     /84   Pulse 92   Temp 99.3 °F (37.4 °C)   Ht 5' 2\" (1.575 m)   Wt 192 lb (87.1 kg)   SpO2 97%   BMI 35.12 kg/m²    Physical Exam  Vitals and nursing note reviewed. Constitutional:       Appearance: Normal appearance. HENT:      Head: Normocephalic and atraumatic. Right Ear: Tympanic membrane, ear canal and external ear normal.      Left Ear: Tympanic membrane, ear canal and external ear normal.      Nose: Nose normal.      Mouth/Throat:      Mouth: Mucous membranes are moist.   Eyes:      Extraocular Movements: Extraocular movements intact. Conjunctiva/sclera: Conjunctivae normal.      Pupils: Pupils are equal, round, and reactive to light. Cardiovascular:      Rate and Rhythm: Normal rate and regular rhythm. Pulses: Normal pulses. Heart sounds: Normal heart sounds. Pulmonary:      Effort: Pulmonary effort is normal.      Breath sounds: Normal breath sounds. Abdominal:      General: Abdomen is flat. Bowel sounds are normal.      Palpations: Abdomen is soft. Musculoskeletal:         General: Normal range of motion. Cervical back: Normal range of motion and neck supple. Skin:     General: Skin is warm. Capillary Refill: Capillary refill takes less than 2 seconds. Neurological:      General: No focal deficit present. Mental Status: He is alert and oriented to person, place, and time. Psychiatric:         Mood and Affect: Mood normal.         Behavior: Behavior normal.         Thought Content: Thought content normal.         Judgment: Judgment normal.         ASSESSMENT/PLAN     1. Hypothyroidism, unspecified type  - CBC with Auto Differential; Future  - TSH; Future  - T4, Free; Future  - ABO/RH; Future  - Thyroid Antibodies; Future    2. On statin therapy  - CBC with Auto Differential; Future  - Lipid, Fasting;  Future  - Comprehensive Metabolic Panel, Fasting; Future  - ABO/RH; Future    3. Vitamin D deficiency  - CBC with Auto Differential; Future  - Vitamin D 25 Hydroxy; Future  - ABO/RH; Future    4. Hyperlipidemia, unspecified hyperlipidemia type  - CBC with Auto Differential; Future  - Lipid, Fasting; Future  - Comprehensive Metabolic Panel, Fasting; Future  - ABO/RH; Future    5. Other fatigue  - Vitamin B12 & Folate; Future  - ABO/RH; Future  - NNEKA Screen With Reflex; Future  - Rheumatoid Factor; Future    6. Diarrhea, unspecified type  - Abram Weir MD, Gastroenterology, [place]    7. Gastroesophageal reflux disease without esophagitis  - [facility] - Chaparro Olivera MD, Gastroenterology, [place]    8. Allergic rhinitis, unspecified seasonality, unspecified trigger  - Maureen Love MD, Allergy & Immunology, [place]    9. Dermatitis  - Dani Saini MD, Dermatology, [place]    10. Encounter for gynecological examination  - Terry Santiago MD OB/GYN, [place]    11. Congenital pes planus, unspecified laterality  - [facility] - Mary Lou Rosado DPM, Podiatry, [place]    12. Encounter to establish care with new doctor      Records reviewed    Patient presents to establish care. Patient also with multiple acute issues to address today. Patient also working with endocrine Dr. Jazlyn Sandhu for gender change on long term testosterone treatments. Patient is concerned with possible food allergies - some foods seem to make throat feel funny. Allergy referral placed. Patient also with chronic issues of diarrhea and reflux. Avoid known triggers. Check labs. GI referral palced    Patient also wit chronic fatigue.   Has had labs in the past.  Will recheck labs    Thyroid - cont medication, recheck labs, sees endocrine    Lipids - cont med, cont healthy diet, will recheck labs    History of vit d def - will recheck labs    Concern with dry skin - especially scalp - derm referral    Chronic juve pes planus - needs new inserts - referral placed to podiatry    Over 65 minutes spent on patient care and patient visit. Return for follow up in 6 to 8 weeks - please give 30 minutes.     COMMUNICATION:       Electronically signed by Caden Segura MD on 3/8/2022 at 11:09 AM

## 2022-03-10 LAB
ALBUMIN SERPL-MCNC: 4.7 G/DL
ALP BLD-CCNC: 77 U/L
ALT SERPL-CCNC: 68 U/L
AST SERPL-CCNC: 44 U/L
BASOPHILS ABSOLUTE: 0.07 /ΜL
BASOPHILS RELATIVE PERCENT: 0.9 %
BILIRUB SERPL-MCNC: 0.5 MG/DL (ref 0.1–1.4)
BUN BLDV-MCNC: 11 MG/DL
CALCIUM SERPL-MCNC: 9.1 MG/DL
CHLORIDE BLD-SCNC: 102 MMOL/L
CHOLESTEROL, FASTING: 132
CO2: 28 MMOL/L
CREAT SERPL-MCNC: 1.05 MG/DL
EOSINOPHILS ABSOLUTE: 0.17 /ΜL
EOSINOPHILS RELATIVE PERCENT: 2.2 %
FOLATE: >20
GLUCOSE FASTING: 89 MG/DL
HCT VFR BLD CALC: 45.9 %
HDLC SERPL-MCNC: 38 MG/DL (ref 35–70)
HEMOGLOBIN: 15.6 G/DL
LDL CHOLESTEROL CALCULATED: 69 MG/DL (ref 0–160)
LYMPHOCYTES ABSOLUTE: 2.71 /ΜL
LYMPHOCYTES RELATIVE PERCENT: 35.4 %
MCH RBC QN AUTO: 32.3 PG
MCHC RBC AUTO-ENTMCNC: 34 G/DL
MCV RBC AUTO: 95 FL
MONOCYTES ABSOLUTE: 0.63 /ΜL
MONOCYTES RELATIVE PERCENT: 8.2 %
NEUTROPHILS ABSOLUTE: 4.06 /ΜL
NEUTROPHILS RELATIVE PERCENT: 53 %
PDW BLD-RTO: 43 %
PLATELET # BLD: 266 K/ΜL
PMV BLD AUTO: NORMAL FL
POTASSIUM SERPL-SCNC: 4.9 MMOL/L
RBC # BLD: 4.83 10^6/ΜL
RHEUMATOID FACTOR: <9
SODIUM BLD-SCNC: 139 MMOL/L
T4 FREE: 1.05
TOTAL PROTEIN: 6.9 G/DL (ref 6.4–8.2)
TRIGLYCERIDE, FASTING: 123
TSH SERPL DL<=0.05 MIU/L-ACNC: 1.57 UIU/ML
VITAMIN B-12: 961
VITAMIN D 25-HYDROXY: 46.3
VITAMIN D2, 25 HYDROXY: NORMAL
VITAMIN D3,25 HYDROXY: NORMAL
WBC # BLD: 7.66 10^3/ML

## 2022-03-11 ENCOUNTER — OFFICE VISIT (OUTPATIENT)
Dept: DERMATOLOGY | Age: 27
End: 2022-03-11
Payer: COMMERCIAL

## 2022-03-11 VITALS
HEART RATE: 62 BPM | BODY MASS INDEX: 36.44 KG/M2 | HEIGHT: 62 IN | SYSTOLIC BLOOD PRESSURE: 116 MMHG | WEIGHT: 198 LBS | DIASTOLIC BLOOD PRESSURE: 77 MMHG | TEMPERATURE: 98.4 F | OXYGEN SATURATION: 97 %

## 2022-03-11 DIAGNOSIS — L21.9 SEBORRHEIC DERMATITIS: Primary | ICD-10-CM

## 2022-03-11 DIAGNOSIS — L40.8 INVERSE PSORIASIS: ICD-10-CM

## 2022-03-11 DIAGNOSIS — L85.8 KERATOSIS PILARIS: ICD-10-CM

## 2022-03-11 PROCEDURE — 99204 OFFICE O/P NEW MOD 45 MIN: CPT | Performed by: PHYSICIAN ASSISTANT

## 2022-03-11 RX ORDER — TRIAMCINOLONE ACETONIDE 1 MG/G
CREAM TOPICAL
Qty: 80 G | Refills: 1 | Status: SHIPPED | OUTPATIENT
Start: 2022-03-11

## 2022-03-11 RX ORDER — KETOCONAZOLE 20 MG/ML
SHAMPOO TOPICAL
Qty: 120 ML | Refills: 2 | Status: SHIPPED | OUTPATIENT
Start: 2022-03-11 | End: 2022-08-17 | Stop reason: SDUPTHER

## 2022-03-11 RX ORDER — FLUOCINONIDE TOPICAL SOLUTION USP, 0.05% 0.5 MG/ML
SOLUTION TOPICAL
Qty: 60 ML | Refills: 2 | Status: SHIPPED | OUTPATIENT
Start: 2022-03-11

## 2022-03-11 NOTE — PROGRESS NOTES
Dermatology Patient Note  [facility]\[address]  [address]  Dept: [phone]  Dept Fax: [phone]      VISITDATE: 3/11/2022   REFERRING PROVIDER: Lyly Francisco MD      Jon Lopez is a 23 y.o. adult  who presents today in the office for:    New Patient (dandruff in hair- tried medicated shampoo but tends to dry out hair & cause itching; lower back itching- tried OTC lotion & anti itch cream with little help/)      HISTORY OF PRESENT ILLNESS:  As above. C/o scaling and prutitus on scalp, and superior gluteal cleft.     MEDICAL PROBLEMS:  Patient Active Problem List    Diagnosis Date Noted    Hypothyroidism 03/08/2022    Vitamin D deficiency 03/08/2022    On statin therapy 03/08/2022    Pain in both feet 08/10/2016    Excessive daytime sleepiness 06/21/2016    Flat feet, bilateral 06/21/2016    Depression with anxiety 10/28/2014       CURRENT MEDICATIONS:   Current Outpatient Medications   Medication Sig Dispense Refill    ketoconazole (NIZORAL) 2 % shampoo Apply 3-4 times weekly to scalp, leave on for five minutes prior to washing off 120 mL 2    triamcinolone (KENALOG) 0.1 % cream Apply to rash twice daily (not face, armpit or groin) 80 g 1    fluocinonide (LIDEX) 0.05 % external solution Apply to scalp daily for rash 60 mL 2    ondansetron (ZOFRAN-ODT) 8 MG TBDP disintegrating tablet DISSOLVE 1 TABLET ON THE TONGUE EVERY 8 HOURS      B-D 3CC LUER-LENA SYR 99EE6-2/2 22G X 1-1/2\" 3 ML MISC       cimetidine (TAGAMET) 800 MG tablet Take 1 tablet by mouth 2 times daily 90 tablet 3    busPIRone (BUSPAR) 10 MG tablet 20 mg daily       vitamin D (ERGOCALCIFEROL) 1.25 MG (14741 UT) CAPS capsule       BD DISP NEEDLE 23G X 1\" MISC       testosterone cypionate (DEPOTESTOTERONE CYPIONATE) 200 MG/ML injection       loratadine (CLARITIN) 10 MG tablet Claritin TABS   Refills: 0    Active      levothyroxine (SYNTHROID) 50 MCG tablet Take 50 mcg by mouth Daily      Ferrous Sulfate (IRON PO) Take by mouth      emtricitabine-tenofovir alafenamide (DESCOVY) 200-25 MG TABS tablet Take 1 tablet by mouth daily      atorvastatin (LIPITOR) 10 MG tablet TAKE 1 TABLET BY MOUTH ONE TIME A DAY  3    testosterone cypionate (DEPOTESTOTERONE CYPIONATE) 100 MG/ML injection Inject 100 mg into the muscle once. TAKES 0.2ML .  vilazodone HCl (VIIBRYD) 20 MG TABS Take 40 mg by mouth       No current facility-administered medications for this visit. ALLERGIES:   Allergies   Allergen Reactions    Bupropion      Unknown Reaction. SOCIAL HISTORY:  Social History     Tobacco Use    Smoking status: Never Smoker    Smokeless tobacco: Never Used   Substance Use Topics    Alcohol use: No       Pertinent ROS:  Review of Systems  Skin: Denies any new changing, growing or bleeding lesions or rashes except as described in the HPI   Constitutional: Denies fevers, chills, and malaise. PHYSICAL EXAM:   /77   Pulse 62   Temp 98.4 °F (36.9 °C)   Ht 5' 2\" (1.575 m)   Wt 198 lb (89.8 kg)   SpO2 97%   BMI 36.21 kg/m²     The patient is generally well appearing, well nourished, alert and conversational. Affect is normal.    Cutaneous Exam:  Physical Exam  Face, neck, scalp, UE, and superior buttocks    Facial covering was not removed during examination. Diagnoses/exam findings/medical history pertinent to this visit are listed below:    Assessment:   Diagnosis Orders   1. Seborrheic dermatitis  ketoconazole (NIZORAL) 2 % shampoo    fluocinonide (LIDEX) 0.05 % external solution   2. Inverse psoriasis  triamcinolone (KENALOG) 0.1 % cream   3. Keratosis pilaris      - chronic illness with progression and/or exacerbation    Plan:  1. Seborrheic dermatitis  - ketoconazole (NIZORAL) 2 % shampoo;  Apply 3-4 times weekly to scalp, leave on for five minutes prior to washing off  Dispense: 120 mL; Refill: 2  - fluocinonide (LIDEX) 0.05 % external solution; Apply to scalp daily for rash  Dispense: 60 mL; Refill: 2    2. Inverse psoriasis  - triamcinolone (KENALOG) 0.1 % cream; Apply to rash twice daily (not face, armpit or groin)  Dispense: 80 g; Refill: 1  - Pt. Was counseled to use topical steroids sparingly in this area, in order to prevent thinning of the skin. 3. Keratosis pilaris  - reassurance and education    RTC 3 months    Future Appointments   Date Time Provider [place]   4/4/2022  9:30 AM Ayesha Chung DPM [facility]   4/19/2022  1:45 PM Dante Robin MD scar GI [facility]   5/3/2022 11:00 AM Zaida Mckeon MD SCAR FM [facility]   5/12/2022  1:15 PM Amena Guillermo PA-C  derm [facility]   5/23/2022  2:30 PM [facility]     Attending Supervising Physicians Attestation Statement  The patient met the criteria for indirect supervision. I discussed the findings and plans with the physician assistant and agree as documented in his note . Electronically signed by Miko Schulte MD on 3/13/22 at 3:51 PM EDT    Patient Instructions   For KP- use salicylic acid cereve SA moisturizer   Fluocinonide- Apply to scalp daily for rash  Nizoral shp 3-4 times weekly   kenolog cream - twice daily.          Electronically signed by Amena Guillermo PA-C on 3/11/22 at 3:03 PM EST

## 2022-03-11 NOTE — PATIENT INSTRUCTIONS
For KP- use salicylic acid cereve SA moisturizer   Fluocinonide- Apply to scalp daily for rash  Nizoral shp 3-4 times weekly   kenolog cream - twice daily.

## 2022-03-13 PROBLEM — E78.5 HYPERLIPIDEMIA: Status: ACTIVE | Noted: 2022-03-13

## 2022-03-13 PROBLEM — K21.9 GASTROESOPHAGEAL REFLUX DISEASE WITHOUT ESOPHAGITIS: Status: ACTIVE | Noted: 2022-03-13

## 2022-03-13 PROBLEM — R53.83 OTHER FATIGUE: Status: ACTIVE | Noted: 2022-03-13

## 2022-03-13 PROBLEM — L30.9 DERMATITIS: Status: ACTIVE | Noted: 2022-03-13

## 2022-03-13 PROBLEM — Q66.50 CONGENITAL PES PLANUS: Status: ACTIVE | Noted: 2022-03-13

## 2022-03-13 PROBLEM — R19.7 DIARRHEA: Status: ACTIVE | Noted: 2022-03-13

## 2022-03-13 PROBLEM — J30.9 ALLERGIC RHINITIS: Status: ACTIVE | Noted: 2022-03-13

## 2022-03-14 ENCOUNTER — TELEPHONE (OUTPATIENT)
Dept: FAMILY MEDICINE CLINIC | Age: 27
End: 2022-03-14

## 2022-03-14 DIAGNOSIS — E78.5 HYPERLIPIDEMIA, UNSPECIFIED HYPERLIPIDEMIA TYPE: ICD-10-CM

## 2022-03-14 DIAGNOSIS — E55.9 VITAMIN D DEFICIENCY: ICD-10-CM

## 2022-03-14 DIAGNOSIS — E03.9 HYPOTHYROIDISM, UNSPECIFIED TYPE: ICD-10-CM

## 2022-03-14 DIAGNOSIS — Z79.899 ON STATIN THERAPY: ICD-10-CM

## 2022-03-14 DIAGNOSIS — R53.83 OTHER FATIGUE: ICD-10-CM

## 2022-03-14 NOTE — TELEPHONE ENCOUNTER
----- Message from Nasima Chaudhry sent at 3/14/2022  3:24 PM EDT -----  Subject: Message to Provider    QUESTIONS  Information for Provider? Pt called in and stated got a referral last week   and stated they are saying they are having trouble getting the referral if   can be faxed over . The provider was Monie Lynn and fax number   [phone]  ---------------------------------------------------------------------------  --------------  Ronit HIGHTOWER  What is the best way for the office to contact you? OK to leave message on   voicemail  Preferred Call Back Phone Number? [phone]  ---------------------------------------------------------------------------  --------------  SCRIPT ANSWERS  Relationship to Patient?  Self

## 2022-04-02 ENCOUNTER — APPOINTMENT (OUTPATIENT)
Dept: CT IMAGING | Facility: CLINIC | Age: 27
End: 2022-04-02
Payer: COMMERCIAL

## 2022-04-02 ENCOUNTER — HOSPITAL ENCOUNTER (EMERGENCY)
Facility: CLINIC | Age: 27
Discharge: HOME | End: 2022-04-02
Attending: EMERGENCY MEDICINE
Payer: COMMERCIAL

## 2022-04-02 VITALS
DIASTOLIC BLOOD PRESSURE: 84 MMHG | WEIGHT: 190 LBS | HEART RATE: 77 BPM | OXYGEN SATURATION: 98 % | HEIGHT: 62 IN | RESPIRATION RATE: 16 BRPM | SYSTOLIC BLOOD PRESSURE: 125 MMHG | TEMPERATURE: 98.6 F | BODY MASS INDEX: 34.96 KG/M2

## 2022-04-02 DIAGNOSIS — S09.90XA CLOSED HEAD INJURY, INITIAL ENCOUNTER: Primary | ICD-10-CM

## 2022-04-02 PROCEDURE — 70450 CT HEAD/BRAIN W/O DYE: CPT

## 2022-04-02 PROCEDURE — 99284 EMERGENCY DEPT VISIT MOD MDM: CPT

## 2022-04-02 ASSESSMENT — PAIN DESCRIPTION - LOCATION: LOCATION: HEAD

## 2022-04-02 ASSESSMENT — PAIN DESCRIPTION - DESCRIPTORS: DESCRIPTORS: ACHING

## 2022-04-02 ASSESSMENT — PAIN DESCRIPTION - PAIN TYPE: TYPE: ACUTE PAIN

## 2022-04-02 ASSESSMENT — PAIN DESCRIPTION - ORIENTATION: ORIENTATION: RIGHT

## 2022-04-02 ASSESSMENT — PAIN DESCRIPTION - FREQUENCY: FREQUENCY: CONTINUOUS

## 2022-04-02 ASSESSMENT — PAIN DESCRIPTION - ONSET: ONSET: ON-GOING

## 2022-04-02 ASSESSMENT — PAIN DESCRIPTION - PROGRESSION: CLINICAL_PROGRESSION: NOT CHANGED

## 2022-04-02 ASSESSMENT — PAIN SCALES - GENERAL: PAINLEVEL_OUTOF10: 3

## 2022-04-02 ASSESSMENT — ENCOUNTER SYMPTOMS
GASTROINTESTINAL NEGATIVE: 1
RESPIRATORY NEGATIVE: 1
EYES NEGATIVE: 1

## 2022-04-02 ASSESSMENT — PAIN - FUNCTIONAL ASSESSMENT: PAIN_FUNCTIONAL_ASSESSMENT: 0-10

## 2022-04-02 NOTE — ED PROVIDER NOTES
[facility]  [facility]  Phone: [phone]        Pt Name: Tamika Michael  MRN: [id number]  [place] 1995  Date of evaluation: 4/2/22    [address] [place] [address]       Chief Complaint   Patient presents with    Head Injury    Headache       HISTORY OF PRESENT ILLNESS (Location/Symptom, Timing/Onset, Context/Setting, Quality, Duration, Modifying Factors, Severity)      Tamika Michael is a 23 y.o. adult with no pertinent PMH who presents to the ED via private auto with headache after fall while rollerblading. Patient states that he was rollerskating when he tripped forward on her knees and hit forehead on the ground. Denies any loss consciousness, vomiting, dizziness, vision changes but does report some mild nausea. Patient did take a Advil prior arrival to did help with symptoms. Patient nothing makes symptoms worse at this time. Patient denies any blood thinner use, numbness tingling, saddle paresthesia, loss of bowel bladder function. PAST MEDICAL / SURGICAL / SOCIAL / FAMILY HISTORY     PMH:  has a past medical history of Depression. Surgical History:  has a past surgical history that includes [place] tooth extraction and Mastectomy (Bilateral, 01/20/2021). Social History:  reports that he has never smoked. He has never used smokeless tobacco. He reports that he does not drink alcohol and does not use drugs. Family History: He indicated that his mother is alive. He indicated that his father is alive. He indicated that his brother is alive. family history includes Alcohol Abuse in his father and mother; Cancer (age of onset: 48) in his mother; Substance Abuse in his father and mother. Psychiatric History: None    Allergies: Bupropion    Home Medications:   Prior to Admission medications    Medication Sig Start Date End Date Taking?  Authorizing Provider   ketoconazole (NIZORAL) 2 % shampoo Apply 3-4 times weekly to scalp, leave on for five minutes prior to washing off 3/11/22   Luz Ragsdale PA-C   triamcinolone (KENALOG) 0.1 % cream Apply to rash twice daily (not face, armpit or groin) 3/11/22   Luz Ragsdale PA-C   fluocinonide (LIDEX) 0.05 % external solution Apply to scalp daily for rash 3/11/22   Luz Ragsdale PA-C   ondansetron (ZOFRAN-ODT) 8 MG TBDP disintegrating tablet DISSOLVE 1 TABLET ON THE TONGUE EVERY 8 HOURS 1/19/21   Historical Provider, MD HERR 3CC LUER-LENA SYR 46MW1-4/2 22G X 1-1/2\" 3 ML MISC  3/16/21   Historical Provider, MD   cimetidine (TAGAMET) 800 MG tablet Take 1 tablet by mouth 2 times daily 3/23/21 3/11/22  Az Irizarry MD   busPIRone (BUSPAR) 10 MG tablet 20 mg daily  11/18/20   Historical Provider, MD   vitamin D (ERGOCALCIFEROL) 1.25 MG (36102 UT) CAPS capsule  11/13/20   Historical Provider, MD   BD DISP NEEDLE 23G X 1\" [address] [facility]  11/16/20   Historical Provider, MD   testosterone cypionate (DEPOTESTOTERONE CYPIONATE) 200 MG/ML injection  12/21/20   Historical Provider, MD   loratadine (CLARITIN) 10 MG tablet Claritin TABS   Refills: 0    Active    Historical Provider, MD   levothyroxine (SYNTHROID) 50 MCG tablet Take 50 mcg by mouth Daily    Historical Provider, MD   Ferrous Sulfate (IRON PO) Take by mouth    Historical Provider, MD   emtricitabine-tenofovir alafenamide (DESCOVY) 200-25 MG TABS tablet Take 1 tablet by mouth daily    Historical Provider, MD   atorvastatin (LIPITOR) 10 MG tablet TAKE 1 TABLET BY MOUTH ONE TIME A DAY 11/26/19   Historical Provider, MD   testosterone cypionate (DEPOTESTOTERONE CYPIONATE) 100 MG/ML injection Inject 100 mg into the muscle once. TAKES 0.2ML . Historical Provider, MD   vilazodone HCl (VIIBRYD) 20 MG TABS Take 40 mg by mouth    Historical Provider, MD       REVIEW OF SYSTEMS  (2-9 systems for level 4, 10 ormore for level 5)      Review of Systems   Constitutional: Negative. HENT: Negative. Eyes: Negative. Respiratory: Negative. Cardiovascular: Negative.     Gastrointestinal: Negative. Genitourinary: Negative. Musculoskeletal: Negative. Skin: Negative. Neurological: Positive for headaches. Negative for dizziness, tremors, seizures, syncope, facial asymmetry, speech difficulty, weakness, light-headedness and numbness. All other systems negative except as marked. PHYSICAL EXAM  (up to 7 for level 4, 8 or more for level 5)      INITIAL VITALS:  height is 5' 2\" (1.575 m) and weight is 86.2 kg (190 lb). His oral temperature is 98.6 °F (37 °C). His blood pressure is 125/84 and his pulse is 77. His respiration is 16 and oxygen saturation is 98%. Vital signs reviewed. Physical Exam  Constitutional:       General: He is not in acute distress. Appearance: Normal appearance. He is not ill-appearing or toxic-appearing. HENT:      Head: Normocephalic. Abrasion present. Nose: Nose normal.      Mouth/Throat:      Mouth: Mucous membranes are moist.      Pharynx: Oropharynx is clear. Eyes:      Extraocular Movements: Extraocular movements intact. Conjunctiva/sclera: Conjunctivae normal.      Pupils: Pupils are equal, round, and reactive to light. Cardiovascular:      Rate and Rhythm: Normal rate and regular rhythm. Pulses: Normal pulses. Heart sounds: Normal heart sounds. Pulmonary:      Effort: Pulmonary effort is normal.      Breath sounds: Normal breath sounds. Abdominal:      General: Abdomen is flat. There is no distension. Palpations: Abdomen is soft. Tenderness: There is no abdominal tenderness. There is no guarding. Musculoskeletal:      Cervical back: Normal range of motion and neck supple. No rigidity or tenderness. Right lower leg: No edema. Left lower leg: No edema. Skin:     General: Skin is warm and dry. Capillary Refill: Capillary refill takes less than 2 seconds. Neurological:      General: No focal deficit present. Mental Status: He is alert and oriented to person, place, and time. Psychiatric:         Mood and Affect: Mood normal.         Behavior: Behavior normal.           DIFFERENTIAL DIAGNOSIS / MDM     Abdomen physical exam, I do believe that could be possible patient has a small concussion likely from a low mechanism strike to the head. After discussion with patient, patient states that he would like to have a CT scan of the head completed as it would \"make him feel more comfortable\" and that he has met his deductible and does not concern for any financial cost.  Patient's neuro exam is negative, patient denies any loss conscious, heavy mechanism, vomiting, mental status changes, behavioral changes, use of blood thinners, or numbness tingling. Patient did ambulate to the room with a narrow steady gait. We will obtain a CT head per patient request at this time. CT head shows no acute intracranial abnormalities there is incidental finding of a arachnoid cyst  Plan discharge patient home to follow-up with PCP within 1 day. Instructed patient that they can take Tylenol or Profen at home as needed for pain. Instructed to return the ER with any worsening pain, vision changes, dizziness, vomiting, chest pain, shortness of breath, abdominal pain. Patient is agreement to this plan at this time. All question concerns were answered at this time. The patient presents with a closed head injury. The patient is neurologically intact. The presentation does not suggest a serious head injury. Signs and symptoms of a serious head injury have been discussed with the patient and caregiver, who will be vigilant for these. Concerns of repeat head injury have also been discussed. The patient has been observed adequately in the ED. Pt has been instructed to return to the ED if symptoms do not go away or worsen or change in any way.      The patient understands that at this time there is no evidence for a more malignant underlying process, but the patient also understands that early in the process of an illness or injury, an emergency department workup can be falsely reassuring. Routine discharge counseling was given, and the patient understands that worsening, changing or persistent symptoms should prompt an immediate call or follow up with their primary physician or return to the emergency department. The importance of appropriate follow up was also discussed. I have reviewed the disposition diagnosis with the patient and or their family/guardian. I have answered their questions and given discharge instructions. They voiced understanding of these instructions and did not have any further questions or complaints. PLAN (LABS / IMAGING / EKG):  Orders Placed This Encounter   Procedures    CT Head WO Contrast       MEDICATIONS ORDERED:  No orders of the defined types were placed in this encounter. Controlled Substances Monitoring:     DIAGNOSTIC RESULTS     EKG: All EKG's are interpreted by the Emergency Department Physician who either signs or Co-signs this chart in the absenceof a cardiologist.        RADIOLOGY: All images are read by the radiologist and their interpretations are reviewed. CT Head WO Contrast   Final Result   No acute intracranial abnormality. Midline posterior fossa extra-axial fluid   collection appearance favoring arachnoid cyst.  Minimal-to-mild cortical   atrophy, prominent for age. No results found. LABS:  No results found for this visit on 04/02/22. EMERGENCY DEPARTMENT COURSE           Vitals:    Vitals:    04/02/22 1506   BP: 125/84   Pulse: 77   Resp: 16   Temp: 98.6 °F (37 °C)   TempSrc: Oral   SpO2: 98%   Weight: 86.2 kg (190 lb)   Height: 5' 2\" (1.575 m)     -------------------------  BP: 125/84, Temp: 98.6 °F (37 °C), Pulse: 77, Resp: 16      RE-EVALUATION:  See ED Course notes above. CONSULTS:  None    PROCEDURES:  None    FINAL IMPRESSION      1.  Closed head injury, initial encounter          DISPOSITION / PLAN     CONDITION ON DISPOSITION:   Good for discharge.      PATIENT REFERRED TO:  Lisette Aguila, [address]  [facility] [phone]    Call in 1 day      [facility]  REBEKA/ Ricky   [phone]    If symptoms worsen      DISCHARGE MEDICATIONS:  Discharge Medication List as of 4/2/2022  4:08 PM          IONA Gooden CNP   Emergency Medicine Nurse Practitioner    (Please note that portions of this note were completed with a voice recognition program.  Efforts were made to edit the dictations but occasionally words aremis-transcribed.)       IONA Gooden CNP  04/02/22 1091

## 2022-04-02 NOTE — ED PROVIDER NOTES
I was present in the ED during this patient's evaluation and management by the Advance Practice Provider and was available to address any concerns about their medical management.     Suleman Ashley MD  Attending, Emergency Department      Sheyla Reynaga MD  04/02/22 1562

## 2022-04-02 NOTE — ED NOTES
Patient to ED via self and significant other to room 5  Patient here for complaint of head injury and headache  Patient states he was skating at [facility] when he had a trip, fell, and hit his head  Patient denies losing consciousness but is having some difficulty with short term memory after the events  Otherwise states he is having a headache and slight dizziness  Denies CP, SOB, or N/V  Alert and oriented on arrival, answering all questions appropriately    Vitals obtained and call light provided  Patient resting comfortably on stretcher in no apparent distress  Respirations even and non-labored  Awaiting provider evaluation at this time     Rachna Ureña RN  04/02/22 2383

## 2022-04-04 ENCOUNTER — OFFICE VISIT (OUTPATIENT)
Dept: PODIATRY | Age: 27
End: 2022-04-04
Payer: COMMERCIAL

## 2022-04-04 ENCOUNTER — TELEPHONE (OUTPATIENT)
Dept: FAMILY MEDICINE CLINIC | Age: 27
End: 2022-04-04

## 2022-04-04 VITALS — WEIGHT: 190 LBS | BODY MASS INDEX: 34.96 KG/M2 | HEIGHT: 62 IN | RESPIRATION RATE: 16 BRPM

## 2022-04-04 DIAGNOSIS — M21.42 PES PLANUS OF BOTH FEET: Primary | ICD-10-CM

## 2022-04-04 DIAGNOSIS — M79.604 PAIN IN BOTH LOWER EXTREMITIES: ICD-10-CM

## 2022-04-04 DIAGNOSIS — M21.41 PES PLANUS OF BOTH FEET: Primary | ICD-10-CM

## 2022-04-04 DIAGNOSIS — M79.605 PAIN IN BOTH LOWER EXTREMITIES: ICD-10-CM

## 2022-04-04 DIAGNOSIS — M72.2 PLANTAR FASCIITIS: ICD-10-CM

## 2022-04-04 PROCEDURE — 1036F TOBACCO NON-USER: CPT | Performed by: PODIATRIST

## 2022-04-04 PROCEDURE — 99203 OFFICE O/P NEW LOW 30 MIN: CPT | Performed by: PODIATRIST

## 2022-04-04 PROCEDURE — G8427 DOCREV CUR MEDS BY ELIG CLIN: HCPCS | Performed by: PODIATRIST

## 2022-04-04 PROCEDURE — G8417 CALC BMI ABV UP PARAM F/U: HCPCS | Performed by: PODIATRIST

## 2022-04-04 PROCEDURE — L3020 FOOT LONGITUD/METATARSAL SUP: HCPCS | Performed by: PODIATRIST

## 2022-04-04 NOTE — PROGRESS NOTES
[address] [facility] PODIATRY [facility]  [address] [address] [place] [address]  Dept: [phone]  Dept Fax: [phone]    NEW PATIENT PROGRESS NOTE  Date of patient's visit: 4/4/2022  Patient's Name:  Gasper Sweeney YOB: 1995            Patient Care Team:  Kaela Narayanan MD as PCP - General (Internal Medicine)  Kaela Narayanan MD as PCP - [facility]  Dalia Mooney DPM as Consulting Physician (Podiatry)        Chief Complaint   Patient presents with    New Patient     Order shoe inserts          HPI:   Gasper Sweeney is a 23 y.o. adult who presents to the office today complaining of increased painful flat feet. Patient relates pain is Present. Pain is rated 5 out of 10 and is described as intermittent. Treatments prior to today's visit include: orthotics but states they are worn down. Currently denies F/C/N/V. Pt's primary care physician is Kaela Narayanan MD last seen 03/08/2022     Allergies   Allergen Reactions    Bupropion      Unknown Reaction.  Adhesive Tape Dermatitis and Rash       Past Medical History:   Diagnosis Date    Depression        Prior to Admission medications    Medication Sig Start Date End Date Taking?  Authorizing Provider   ketoconazole (NIZORAL) 2 % shampoo Apply 3-4 times weekly to scalp, leave on for five minutes prior to washing off 3/11/22  Yes Rosendo Chowdhury PA-C   triamcinolone (KENALOG) 0.1 % cream Apply to rash twice daily (not face, armpit or groin) 3/11/22  Yes Rosendo Chowdhury PA-C   fluocinonide (LIDEX) 0.05 % external solution Apply to scalp daily for rash 3/11/22  Yes Rosendo Chowdhury PA-C   B-D 3CC LUER-LENA SYR 29KC9-3/2 22G X 1-1/2\" 3 ML MISC  3/16/21  Yes Historical Provider, MD   busPIRone (BUSPAR) 10 MG tablet 20 mg daily  11/18/20  Yes Historical Provider, MD   vitamin D (ERGOCALCIFEROL) 1.25 MG (99780 UT) CAPS capsule  11/13/20  Yes Historical Provider, MD   BD DISP NEEDLE 23G X 1\" [address] [facility]  11/16/20  Yes Historical Provider, MD   testosterone cypionate (DEPOTESTOTERONE CYPIONATE) 200 MG/ML injection  12/21/20  Yes Historical Provider, MD   loratadine (CLARITIN) 10 MG tablet Claritin TABS   Refills: 0    Active   Yes Historical Provider, MD   levothyroxine (SYNTHROID) 50 MCG tablet Take 50 mcg by mouth Daily   Yes Historical Provider, MD   Ferrous Sulfate (IRON PO) Take by mouth   Yes Historical Provider, MD   emtricitabine-tenofovir alafenamide (DESCOVY) 200-25 MG TABS tablet Take 1 tablet by mouth daily   Yes Historical Provider, MD   atorvastatin (LIPITOR) 10 MG tablet TAKE 1 TABLET BY MOUTH ONE TIME A DAY 11/26/19  Yes Historical Provider, MD   vilazodone HCl (VIIBRYD) 20 MG TABS Take 40 mg by mouth   Yes Historical Provider, MD   ondansetron (ZOFRAN-ODT) 8 MG TBDP disintegrating tablet DISSOLVE 1 TABLET ON THE TONGUE EVERY 8 HOURS 1/19/21   Historical Provider, MD   cimetidine (TAGAMET) 800 MG tablet Take 1 tablet by mouth 2 times daily 3/23/21 3/11/22  Haven Perez MD   testosterone cypionate (DEPOTESTOTERONE CYPIONATE) 100 MG/ML injection Inject 100 mg into the muscle once. TAKES 0.2ML . Historical Provider, MD       Past Surgical History:   Procedure Laterality Date    MASTECTOMY Bilateral 01/20/2021    WISDOM TOOTH EXTRACTION         Family History   Problem Relation Age of Onset    Cancer Mother 48        Cervical    Alcohol Abuse Mother     Substance Abuse Mother     Alcohol Abuse Father     Substance Abuse Father        Social History     Tobacco Use    Smoking status: Never Smoker    Smokeless tobacco: Never Used   Substance Use Topics    Alcohol use: No       Review of Systems    Review of Systems:   History obtained from chart review and the patient  General ROS: negative for - chills, fatigue, fever, night sweats or weight gain  Constitutional: Negative for chills, diaphoresis, fatigue, fever and unexpected weight change.   Musculoskeletal: Positive for arthralgias, gait problem and joint swelling. Neurological ROS: negative for - behavioral changes, confusion, headaches or seizures. Negative for weakness and numbness. Dermatological ROS: negative for - mole changes, rash  Cardiovascular: Negative for leg swelling. Gastrointestinal: Negative for constipation, diarrhea, nausea and vomiting. Lower Extremity Physical Examination:   Vitals:   Vitals:    04/04/22 0930   Resp: 16     General: AAO x 3 in NAD. Dermatologic Exam:  Skin lesion/ulceration Absent . Skin No rashes or nodules noted. .       Musculoskeletal:     1st MPJ ROM decreased, Bilateral.  Muscle strength 5/5, Bilateral.  Pain present upon palpation of plantar arch and heel. Medial longitudinal arch, Bilateral decreased. Ankle ROM WNL,Bilateral.    Dorsally contracted digits absent digits 1-5 Bilateral.     Vascular: DP and PT pulses palpable 2/4, Bilateral.  CFT <3 seconds, Bilateral.  Hair growth present to the level of the digits, Bilateral.  Edema absent, Bilateral.  Varicosities absent, Bilateral. Erythema absent, Bilateral    Neurological: Sensation intact to light touch to level of digits, Bilateral.  Protective sensation intact 10/10 sites via 5.07/10g Reserve-Kt Monofilament, Bilateral.  negative Tinel's, Bilateral.  negative Valleix sign, Bilateral.      Integument: Warm, dry, supple, Bilateral.  Open lesion absent, Bilateral.  Interdigital maceration absent to web spaces 1-4, Bilateral.  Nails are normal in length, thickness and color 1-5 bilateral.  Fissures absent, Bilateral.       Asessment: Patient is a 23 y.o. adult with:    Diagnosis Orders   1. Pes planus of both feet  NY FOOT LONGITUD/METATARSAL SUP   2. Pain in both lower extremities  NY FOOT LONGITUD/METATARSAL SUP   3. Plantar fasciitis  NY FOOT LONGITUD/METATARSAL SUP         Plan: Patient examined and evaluated. Current condition and treatment options discussed in detail.   Discussed conservative and surgical options with the patient. Patient was casted for custom molded orthotics today. I feel that these appliances are medically necessary for my patients well being and in my opinion are both reasonable and necessary to the accepted medical practice in the treatment of the above conditions. Custom molded orthotics prevent the over pronation which is leading to excessive tension of the plantar fascia. Abnormal foot/leg functions demands mechanical, functional protections and control. Without custom molded orthotics, the patient may develop chronic pain in her feet. Later on in life, the patient may develop ankle, shin, knee and hip pain as well. In trial usage of felt pads with Lo-Dye ankle strapping to mimic the effects of the orthotics, the patient responded with reduced symptoms and better foot function. Description of the devices: These devices are Root biomechanical orthotic devices made of a plastic polymer with a leather or Spenco-type top cover. These appliances control biomechanical aberrations of the patients feet and accommodate painful areas. Orthotics are not intended to be worn in lieu of shoes, but are removable devices formed from casts of the patients feet and then sent to an independent laboratory for fabrication. Due to the nature of my patients condition, I feel that this therapy is necessary indefinitely, as this is a form of continuous therapy. This is NOT a convenience item. It is my opinion that these devices will prevent the need for costly hospital surgery, further pain and discomfort. The total fee has been itemized to include biomechanical examination, casting of the feet and actual fabrication by the outside laboratory. Verbal and written instructions given to patient. Contact office with any questions/problems/concerns. RTC in 1month(s).     4/4/2022    Electronically signed by Bran Funes DPM on 4/4/2022 at 1:18 PM  4/4/2022

## 2022-04-04 NOTE — TELEPHONE ENCOUNTER
Patient states over the weekend he fell on his head while at [facility] [place], operated by [facility]. Patient states that he went to Urgent Care and they advised him to go to the E.R. Patient states while at the E.R. they had him get a CT Scan. Patient states he does have a concussion. Patient states the E.R. told him to follow up with his PCP, and he was wondering if he should make the appointment before his next appointment in May. Please Advise.

## 2022-04-04 NOTE — TELEPHONE ENCOUNTER
Marino Dang - I can do a virtual visit on Friday April 8th afternoon during the time slots that we discussed.     Thank you

## 2022-04-08 ENCOUNTER — TELEMEDICINE (OUTPATIENT)
Dept: FAMILY MEDICINE CLINIC | Age: 27
End: 2022-04-08
Payer: COMMERCIAL

## 2022-04-08 DIAGNOSIS — G93.0 ARACHNOID CYST: ICD-10-CM

## 2022-04-08 DIAGNOSIS — E03.9 HYPOTHYROIDISM, UNSPECIFIED TYPE: ICD-10-CM

## 2022-04-08 DIAGNOSIS — Z79.899 ON STATIN THERAPY: ICD-10-CM

## 2022-04-08 DIAGNOSIS — E55.9 VITAMIN D DEFICIENCY: ICD-10-CM

## 2022-04-08 DIAGNOSIS — S06.0X0D CONCUSSION WITHOUT LOSS OF CONSCIOUSNESS, SUBSEQUENT ENCOUNTER: Primary | ICD-10-CM

## 2022-04-08 DIAGNOSIS — E78.5 HYPERLIPIDEMIA, UNSPECIFIED HYPERLIPIDEMIA TYPE: ICD-10-CM

## 2022-04-08 DIAGNOSIS — K21.9 GASTROESOPHAGEAL REFLUX DISEASE WITHOUT ESOPHAGITIS: ICD-10-CM

## 2022-04-08 DIAGNOSIS — F41.8 DEPRESSION WITH ANXIETY: ICD-10-CM

## 2022-04-08 PROBLEM — S06.0X0A CONCUSSION WITH NO LOSS OF CONSCIOUSNESS: Status: ACTIVE | Noted: 2022-04-08

## 2022-04-08 PROCEDURE — G8417 CALC BMI ABV UP PARAM F/U: HCPCS | Performed by: INTERNAL MEDICINE

## 2022-04-08 PROCEDURE — 1036F TOBACCO NON-USER: CPT | Performed by: INTERNAL MEDICINE

## 2022-04-08 PROCEDURE — 99214 OFFICE O/P EST MOD 30 MIN: CPT | Performed by: INTERNAL MEDICINE

## 2022-04-08 PROCEDURE — G8427 DOCREV CUR MEDS BY ELIG CLIN: HCPCS | Performed by: INTERNAL MEDICINE

## 2022-04-08 ASSESSMENT — ENCOUNTER SYMPTOMS
GASTROINTESTINAL NEGATIVE: 1
ALLERGIC/IMMUNOLOGIC NEGATIVE: 1
EYES NEGATIVE: 1
RESPIRATORY NEGATIVE: 1

## 2022-04-08 NOTE — PROGRESS NOTES
[address]   [facility]  [address]  [facility]      Date of Visit:  2022  Patient Name: Mayi Olivarez   Patient :  1995     CHIEF COMPLAINT:     Mayi Olivarez is a 23 y.o. adult who presents today for an general visit to be evaluated for the following condition(s):  Chief Complaint   Patient presents with   [address] from Hospital     concussion/  Head ache stopped . REVIEW OF SYSTEM      Review of Systems   Constitutional: Negative. HENT: Negative. Eyes: Negative. Respiratory: Negative. Cardiovascular: Negative. Gastrointestinal: Negative. Endocrine: Negative. Genitourinary: Negative. Musculoskeletal: Negative. Skin: Negative. Allergic/Immunologic: Negative. Neurological: Negative. Hematological: Negative. Psychiatric/Behavioral: Negative. HISTORY OF PRESENT ILLNESS     HPI  Patient presents for follow up of concussion after falling falling while roller blading on . Was evaluated in the ED. Had a CT scan completed that showed no acute changes - did show arachnoid cyst as well as some possible cortical atrophy. Since the head injury - had a few headaches on Monday and Tuesday at the end of the day thru work. The headaches have resolved. Has not had any other symptoms. REVIEWED INFORMATION      Allergies   Allergen Reactions    Bupropion      Unknown Reaction.     Adhesive Tape Dermatitis and Rash       Patient Active Problem List   Diagnosis    Depression with anxiety    Excessive daytime sleepiness    Flat feet, bilateral    Pain in both feet    Hypothyroidism    Vitamin D deficiency    On statin therapy    Hyperlipidemia    Other fatigue    Diarrhea    Gastroesophageal reflux disease without esophagitis    Allergic rhinitis    Dermatitis    Congenital pes planus       Past Medical History: Diagnosis Date    Depression        Past Surgical History:   Procedure Laterality Date    MASTECTOMY Bilateral 01/20/2021    WISDOM TOOTH EXTRACTION          Social History     Socioeconomic History    Marital status: Single     Spouse name: Not on file    Number of children: Not on file    Years of education: Not on file    Highest education level: Not on file   Occupational History    Not on file   Tobacco Use    Smoking status: Never Smoker    Smokeless tobacco: Never Used   Substance and Sexual Activity    Alcohol use: No    Drug use: No    Sexual activity: Not on file   Other Topics Concern    Not on file   Social History Narrative    Not on file     Social Determinants of Health     Financial Resource Strain: Unknown    Difficulty of Paying Living Expenses: Patient refused   Food Insecurity: Unknown    Worried About Running Out of Food in the Last Year: Patient refused    [address] in the Last Year: Patient refused   Transportation Needs:     Lack of Transportation (Medical): Not on file    Lack of Transportation (Non-Medical):  Not on file   Physical Activity:     Days of Exercise per Week: Not on file    Minutes of Exercise per Session: Not on file   Stress:     Feeling of Stress : Not on file   Social Connections:     Frequency of Communication with Friends and Family: Not on file    Frequency of Social Gatherings with Friends and Family: Not on file    Attends Jew Services: Not on file    Active Member of Clubs or Organizations: Not on file    Attends Club or Organization Meetings: Not on file    Marital Status: Not on file   Intimate Partner Violence:     Fear of Current or Ex-Partner: Not on file    Emotionally Abused: Not on file    Physically Abused: Not on file    Sexually Abused: Not on file   Housing Stability:     Unable to Pay for Housing in the Last Year: Not on file    Number of [place] in the Last Year: Not on file    Unstable Housing in the additional testing is required. Referral placed. Patient also working with endocrine Dr. Chantale Pedraza for gender change on long term testosterone treatments.     Patient is concerned with possible food allergies - some foods seem to make throat feel funny. Allergy referral placed at previous visit.     Patient also with chronic issues of diarrhea and reflux. Avoid known triggers. GI referral placed at previous visit     Thyroid - cont medication, labs stable, sees endocrine     Lipids - cont med, cont healthy diet, labs stable     Chronic juve pes planus - needs new inserts - referral placed to podiatry at previous visit    Return for keep scheduled follow up appt.     COMMUNICATION:       Electronically signed by Stewart Ji MD on 4/8/2022 at 2:05 PM

## 2022-04-19 ENCOUNTER — OFFICE VISIT (OUTPATIENT)
Dept: GASTROENTEROLOGY | Age: 27
End: 2022-04-19
Payer: COMMERCIAL

## 2022-04-19 ENCOUNTER — HOSPITAL ENCOUNTER (OUTPATIENT)
Age: 27
Setting detail: SPECIMEN
Discharge: HOME | End: 2022-04-19

## 2022-04-19 ENCOUNTER — TELEPHONE (OUTPATIENT)
Dept: GASTROENTEROLOGY | Age: 27
End: 2022-04-19

## 2022-04-19 VITALS
SYSTOLIC BLOOD PRESSURE: 125 MMHG | HEIGHT: 62 IN | HEART RATE: 85 BPM | WEIGHT: 193 LBS | RESPIRATION RATE: 12 BRPM | BODY MASS INDEX: 35.51 KG/M2 | DIASTOLIC BLOOD PRESSURE: 79 MMHG | OXYGEN SATURATION: 96 %

## 2022-04-19 DIAGNOSIS — R19.7 DIARRHEA, UNSPECIFIED TYPE: Primary | ICD-10-CM

## 2022-04-19 DIAGNOSIS — R19.7 DIARRHEA, UNSPECIFIED TYPE: ICD-10-CM

## 2022-04-19 DIAGNOSIS — K21.9 GASTROESOPHAGEAL REFLUX DISEASE WITHOUT ESOPHAGITIS: ICD-10-CM

## 2022-04-19 PROCEDURE — G8427 DOCREV CUR MEDS BY ELIG CLIN: HCPCS | Performed by: INTERNAL MEDICINE

## 2022-04-19 PROCEDURE — 1036F TOBACCO NON-USER: CPT | Performed by: INTERNAL MEDICINE

## 2022-04-19 PROCEDURE — G8417 CALC BMI ABV UP PARAM F/U: HCPCS | Performed by: INTERNAL MEDICINE

## 2022-04-19 PROCEDURE — 99203 OFFICE O/P NEW LOW 30 MIN: CPT | Performed by: INTERNAL MEDICINE

## 2022-04-19 RX ORDER — POLYETHYLENE GLYCOL 3350 17 G/17G
POWDER, FOR SOLUTION ORAL
Qty: 289 G | Refills: 0 | Status: SHIPPED | OUTPATIENT
Start: 2022-04-19 | End: 2022-05-03

## 2022-04-19 RX ORDER — CIMETIDINE 800 MG
800 TABLET ORAL 2 TIMES DAILY
Qty: 90 TABLET | Refills: 3 | Status: SHIPPED | OUTPATIENT
Start: 2022-04-19 | End: 2022-09-27

## 2022-04-19 RX ORDER — BISACODYL 5 MG
TABLET, DELAYED RELEASE (ENTERIC COATED) ORAL
Qty: 4 TABLET | Refills: 0 | Status: SHIPPED | OUTPATIENT
Start: 2022-04-19 | End: 2022-05-03

## 2022-04-19 NOTE — PROGRESS NOTES
Reason for Referral: Chronic GERD and irregular bowel habits        Chief Complaint   Patient presents with    New Patient     diarrhea since a child    Gastroesophageal Reflux     hx of 3-4 years            HISTORY OF PRESENT ILLNESS: Roque Braswell is a 23 y.o. adult with a past history remarkable for depression, gender dysphoria, mastectomy for cosmetic reasons, on testosterone supplementation, history of depression, chronic history of GERD and irregular bowel movements for several years, referred for evaluation of GERD and irregular bowel habits. Patient reports irregular bowel habits alternating from constipation diarrhea, diarrhea predominant, no obvious dietary triggers reported by the patient. No melena, hematochezia, bright red blood per rectum. Mild steatorrhea, does report isolated episodes of blood-tinged stools. Denies any tenesmus. Does report mild abdominal cramping at times that appeared not to be associated with bowel frequency or episodes. Upper GI symptoms of GERD I do not appear to be related to lower GI symptoms. Denies any unintentional weight loss. No recent endoscopic evaluation. Smoker: None  Drinking history: Socially  Illicit drugs: Cannibus. Abdominal surgeries: None   Prior Colonoscopy: None   Prior EGD: None   FH of GI issues: None    Jan 2022-mastectomy     Past Medical,Family, and Social History reviewed and does contribute to the patient presentingcondition. Patient's PMH/PSH,SH,PSYCH Hx, MEDs, ALLERGIES, and ROS were all reviewed and updated in the appropriate sections.     PAST MEDICAL HISTORY:  Past Medical History:   Diagnosis Date    Depression        Past Surgical History:   Procedure Laterality Date    MASTECTOMY Bilateral 01/20/2021    WISDOM TOOTH EXTRACTION         CURRENT MEDICATIONS:    Current Outpatient Medications:     cimetidine (TAGAMET) 800 MG tablet, Take 1 tablet by mouth 2 times daily, Disp: 90 tablet, Rfl: 3   ketoconazole (NIZORAL) 2 % shampoo, Apply 3-4 times weekly to scalp, leave on for five minutes prior to washing off, Disp: 120 mL, Rfl: 2    triamcinolone (KENALOG) 0.1 % cream, Apply to rash twice daily (not face, armpit or groin), Disp: 80 g, Rfl: 1    fluocinonide (LIDEX) 0.05 % external solution, Apply to scalp daily for rash, Disp: 60 mL, Rfl: 2    B-D 3CC LUER-LENA SYR 89LJ3-0/2 22G X 1-1/2\" 3 ML MISC, , Disp: , Rfl:     busPIRone (BUSPAR) 10 MG tablet, 20 mg daily , Disp: , Rfl:     vitamin D (ERGOCALCIFEROL) 1.25 MG (63016 UT) CAPS capsule, , Disp: , Rfl:     BD DISP NEEDLE 23G X 1\" MISC, , Disp: , Rfl:     testosterone cypionate (DEPOTESTOTERONE CYPIONATE) 200 MG/ML injection, , Disp: , Rfl:     loratadine (CLARITIN) 10 MG tablet, Claritin TABS  Refills: 0  Active, Disp: , Rfl:     levothyroxine (SYNTHROID) 50 MCG tablet, Take 50 mcg by mouth Daily, Disp: , Rfl:     Ferrous Sulfate (IRON PO), Take by mouth, Disp: , Rfl:     emtricitabine-tenofovir alafenamide (DESCOVY) 200-25 MG TABS tablet, Take 1 tablet by mouth daily, Disp: , Rfl:     atorvastatin (LIPITOR) 10 MG tablet, TAKE 1 TABLET BY MOUTH ONE TIME A DAY, Disp: , Rfl: 3    vilazodone HCl (VIIBRYD) 20 MG TABS, Take 40 mg by mouth, Disp: , Rfl:     polyethylene glycol (GLYCOLAX) 17 GM/SCOOP powder, Take as directed for bowel prep/colonoscopy, Disp: 289 g, Rfl: 0    bisacodyl (BISACODYL) 5 MG EC tablet, Take as directed for bowel prep/colonoscopy, Disp: 4 tablet, Rfl: 0    ALLERGIES:   Allergies   Allergen Reactions    Bupropion      Unknown Reaction.     Adhesive Tape Dermatitis and Rash       FAMILY HISTORY:       Problem Relation Age of Onset    Cancer Mother 48        Cervical    Alcohol Abuse Mother     Substance Abuse Mother     Alcohol Abuse Father     Substance Abuse Father          SOCIAL HISTORY:   Social History     Socioeconomic History    Marital status: Single     Spouse name: Not on file    Number of children: Not on file    Years of education: Not on file    Highest education level: Not on file   Occupational History    Not on file   Tobacco Use    Smoking status: Never Smoker    Smokeless tobacco: Never Used   Substance and Sexual Activity    Alcohol use: No    Drug use: No    Sexual activity: Not on file   Other Topics Concern    Not on file   Social History Narrative    Not on file     Social Determinants of Health     Financial Resource Strain: Unknown    Difficulty of Paying Living Expenses: Patient refused   Food Insecurity: Unknown    Worried About Running Out of Food in the Last Year: Patient refused    [address] in the Last Year: Patient refused   Transportation Needs:     Lack of Transportation (Medical): Not on file    Lack of Transportation (Non-Medical): Not on file   Physical Activity:     Days of Exercise per Week: Not on file    Minutes of Exercise per Session: Not on file   Stress:     Feeling of Stress : Not on file   Social Connections:     Frequency of Communication with Friends and Family: Not on file    Frequency of Social Gatherings with Friends and Family: Not on file    Attends Yarsani Services: Not on file    Active Member of [address] [place] [address] or Organizations: Not on file    Attends Club or Organization Meetings: Not on file    Marital Status: Not on file   Intimate Partner Violence:     Fear of Current or Ex-Partner: Not on file    Emotionally Abused: Not on file    Physically Abused: Not on file    Sexually Abused: Not on file   Housing Stability:     Unable to Pay for Housing in the Last Year: Not on file    Number of [place] in the Last Year: Not on file    Unstable Housing in the Last Year: Not on file         REVIEW OF SYSTEMS: A 12-point review of systems was obtained and pertinent positives and negatives were listed below. REVIEW OF SYSTEMS:     Constitutional: No fever, no chills, no lethargy, no weakness.   HEENT:  No headache, otalgia, itchy eyes, nasal discharge or sore throat. Cardiac:  No chest pain, dyspnea, orthopnea or PND. Chest:   No cough, phlegm or wheezing. Abdomen:      Detailed by MA   Neuro:  No focal weakness, abnormal movements or seizure like activity. Skin:   No rashes, no itching. :   No hematuria, no pyuria, no dysuria, no flank pain. Extremities:  No swelling or joint pains. ROS was otherwise negative    Review of Systems    PHYSICAL EXAMINATION: Vital signs reviewed per the nursing documentation. /79   Pulse 85   Resp 12   Ht 5' 2\" (1.575 m)   Wt 193 lb (87.5 kg)   SpO2 96%   BMI 35.30 kg/m²   Body mass index is 35.3 kg/m². Physical Exam    Physical Exam   Constitutional: Patient is oriented to person, place, and time. Patient appears well-developed and well-nourished. HENT:   Head: Normocephalic and atraumatic. Eyes: Pupils are equal, round, and reactive to light. EOM are normal.   Neck: Normal range of motion. Neck supple. No JVD present. No tracheal deviation present. No thyromegaly present. Cardiovascular: Normal rate, regular rhythm, normal heart sounds and intact distal pulses. Pulmonary/Chest: Effort normal and breath sounds normal. No stridor. No respiratory distress. He has no wheezes. He has no rales. He exhibits no tenderness. Abdominal: Soft. Bowel sounds are normal. He exhibits no distension and no mass. There is no tenderness. There is no rebound and no guarding. No hernia. Musculoskeletal: Normal range of motion. Lymphadenopathy:    Patient has no cervical adenopathy. Neurological: Patient is alert and oriented to person, place, and time. Psychiatric: Patient has a normal mood and affect.  Patient behavior is normal.       LABORATORY DATA: Reviewed  Lab Results   Component Value Date    WBC 7.66 03/10/2022    HGB 15.6 03/10/2022    HCT 45.9 03/10/2022    MCV 95.0 03/10/2022     03/10/2022     03/10/2022    K 4.9 03/10/2022     03/10/2022    CO2 28 03/10/2022    BUN 11 03/10/2022    CREATININE 1.05 03/10/2022    LABPROT 6.9 02/07/2013    LABALBU 4.7 03/10/2022    BILITOT 0.5 03/10/2022    ALKPHOS 77 03/10/2022    AST 44 03/10/2022    ALT 68 03/10/2022         Lab Results   Component Value Date    RBC 4.83 03/10/2022    HGB 15.6 03/10/2022    MCV 95.0 03/10/2022    MCH 32.3 03/10/2022    MCHC 34.0 03/10/2022    RDW 43.0 03/10/2022    MPV 8.1 02/07/2013    BASOPCT 0.9 03/10/2022    LYMPHSABS 2.71 03/10/2022    MONOSABS 0.63 03/10/2022    NEUTROABS 4.06 03/10/2022    EOSABS 0.17 03/10/2022    BASOSABS 0.07 03/10/2022         DIAGNOSTIC TESTING:     CT Head WO Contrast    Result Date: 4/2/2022  EXAMINATION: CT OF THE HEAD WITHOUT CONTRAST  4/2/2022 3:24 pm TECHNIQUE: CT of the head was performed without the administration of intravenous contrast. Dose modulation, iterative reconstruction, and/or weight based adjustment of the mA/kV was utilized to reduce the radiation dose to as low as reasonably achievable. COMPARISON: None. HISTORY: ORDERING SYSTEM PROVIDED HISTORY: fall TECHNOLOGIST PROVIDED HISTORY: fall Decision Support Exception - unselect if not a suspected or confirmed emergency medical condition->Emergency Medical Condition (MA) Reason for Exam: Fall, hit head, some trouble with short term memory loss FINDINGS: BRAIN/VENTRICLES: There is no acute intracranial hemorrhage, mass effect or midline shift. Hydrocephalus. The patient has an arachnoid cyst in the posterior fossa 3.5 x 1.2 x 2.5 cm. No other remarkable extra-axial fluid collections are noted. The gray-white differentiation is maintained without evidence of an acute infarct. Cortical atrophy, mild, but remarkable for age is noted. ORBITS: The visualized portion of the orbits demonstrate no acute abnormality. SINUSES: The visualized paranasal sinuses and mastoid air cells demonstrate no acute abnormality. SOFT TISSUES/SKULL:  No acute abnormality of the visualized skull or soft tissues.      No acute intracranial abnormality. Midline posterior fossa extra-axial fluid collection appearance favoring arachnoid cyst.  Minimal-to-mild cortical atrophy, prominent for age. IMPRESSION: Gladys Pang is a 23 y.o. adult with a past history remarkable for depression, gender dysphoria, mastectomy for cosmetic reasons, on testosterone supplementation, history of depression, chronic history of GERD and irregular bowel movements for several years, referred for evaluation of GERD and irregular bowel habits. Patient reports irregular bowel habits alternating from constipation diarrhea, diarrhea predominant, no obvious dietary triggers reported by the patient. No melena, hematochezia, bright red blood per rectum. Mild steatorrhea, does report isolated episodes of blood-tinged stools. Denies any tenesmus. Does report mild abdominal cramping at times that appeared not to be associated with bowel frequency or episodes. Upper GI symptoms of GERD I do not appear to be related to lower GI symptoms. Denies any unintentional weight loss. No recent endoscopic evaluation. Assessment  1. Diarrhea, unspecified type    2. Gastroesophageal reflux disease without esophagitis        Ann Herndon was seen today for new patient and gastroesophageal reflux. Diagnoses and all orders for this visit:    Diarrhea, unspecified type-we will send for celiac disease panel fecal calprotectin and plan for colonoscopy. Send for IBD panel.  -     Celiac Disease Panel; Future  -     IBD Panel; Future  -     Calprotectin Stool; Future  -     EGD; Future  -     COLONOSCOPY (Diagnostic); Future  -     cimetidine (TAGAMET) 800 MG tablet; Take 1 tablet by mouth 2 times daily    Gastroesophageal reflux disease without esophagitis- we will plan on endoscopic evaluation. Patient continue with Tagamet 800 mg daily, extra dose in the evening as needed. Patient advised to avoid any dietary triggers.   Advised to avoid antiacid therapy with thyroid medication. Irregular bowel symptoms.-I be asked to be considered after organic causes have been excluded. RTC: After procedures. Additional comments: Thank you for allowing me to participate in the care of Mr. Adelaide Toro. For any further questions please do not hesitate to contact me. I have reviewed and agree with the MA/LPN ROS please refer to their documentation from today's encounter on a separate note. Ruthie Carrion MD, MPH   Board Certified in Gastroenterology  Board Certified in [address] [place] [address] #: [phone]          this note is created with the assistance of a speech recognition program.  While intending to generate a document that actually reflects the content of the visit, the document can still have some errors including those of syntax and sound a like substitutions which may escape proof reading. It such instances, actual meaning can be extrapolated by contextual diversion.

## 2022-04-19 NOTE — TELEPHONE ENCOUNTER
Procedure Scheduled/Yumiko Ordoñez  Colonoscopy diagnostic - Diarrhea  EGD - Dyspepsia  5/10/22  9:30 am    PBG    Miralax/Dulcolax prep instructions given to patient in office. Patient verbalized understanding.

## 2022-04-20 LAB
GLIADIN DEAMINIDATED PEPTIDE AB IGA: 0.3 U/ML
GLIADIN DEAMINIDATED PEPTIDE AB IGG: <0.4 U/ML
IGA: 89 MG/DL (ref 70–400)
TISSUE TRANSGLUTAMINASE IGA: 0.2 U/ML

## 2022-04-23 LAB — IBD PANEL: NORMAL

## 2022-05-02 ENCOUNTER — OFFICE VISIT (OUTPATIENT)
Dept: PODIATRY | Age: 27
End: 2022-05-02

## 2022-05-02 ENCOUNTER — HOSPITAL ENCOUNTER (OUTPATIENT)
Age: 27
Setting detail: SPECIMEN
Discharge: HOME | End: 2022-05-02

## 2022-05-02 VITALS — HEIGHT: 62 IN | BODY MASS INDEX: 34.96 KG/M2 | WEIGHT: 190 LBS

## 2022-05-02 DIAGNOSIS — R19.7 DIARRHEA, UNSPECIFIED TYPE: ICD-10-CM

## 2022-05-02 DIAGNOSIS — M72.2 PLANTAR FASCIITIS: Primary | ICD-10-CM

## 2022-05-02 PROCEDURE — 99999 PR OFFICE/OUTPT VISIT,PROCEDURE ONLY: CPT | Performed by: PODIATRIST

## 2022-05-02 NOTE — PROGRESS NOTES
[address] [facility] PODIATRY [facility]  [address] [address] [place] [address]  Dept: [phone]  Dept Fax: [phone]    RETURN PATIENT PROGRESS NOTE  Date of patient's visit: 5/2/2022  Patient's Name:  Carol Rosado YOB: 1995            Patient Care Team:  Jose Hensley MD as PCP - General (Internal Medicine)  Jose Hensley MD as PCP - [facility]  Melvina Moore DPM as Consulting Physician (Podiatry)       Carol Rosado 23 y.o. adult that presents for follow-up of   Chief Complaint   Patient presents with    Foot Pain     plantar faciitis - bilateral feet        Symptoms began several month(s) ago and are decreased . Patient relates pain is Present. Pain is rated 2 out of 10 and is described as constant, mild. Treatments prior to today's visit include: Orthotics - here for assistance on how to use device. Currently denies F/C/N/V. Allergies   Allergen Reactions    Bupropion      Unknown Reaction.  Adhesive Tape Dermatitis and Rash       Past Medical History:   Diagnosis Date    Depression        Prior to Admission medications    Medication Sig Start Date End Date Taking?  Authorizing Provider   cimetidine (TAGAMET) 800 MG tablet Take 1 tablet by mouth 2 times daily 4/19/22 5/19/22 Yes Chiqui Shaw MD   polyethylene glycol [facility]) 17 GM/SCOOP powder Take as directed for bowel prep/colonoscopy 4/19/22  Yes Chiqui Shaw MD   bisacodyl (BISACODYL) 5 MG EC tablet Take as directed for bowel prep/colonoscopy 4/19/22  Yes Chiqui Shaw MD   ketoconazole (NIZORAL) 2 % shampoo Apply 3-4 times weekly to scalp, leave on for five minutes prior to washing off 3/11/22  Yes Wisam Michel PA-C   triamcinolone (KENALOG) 0.1 % cream Apply to rash twice daily (not face, armpit or groin) 3/11/22  Yes Wisam Michel PA-C   fluocinonide (LIDEX) 0.05 % external solution Apply to scalp daily for rash 3/11/22  Yes Mikel Riley VAHID Buenrostro   B-D 3CC LUER-LENA SYR [id number]/2 22G X 1-1/2\" 3 ML MISC  3/16/21  Yes Historical Provider, MD   busPIRone (BUSPAR) 10 MG tablet 20 mg daily  11/18/20  Yes Historical Provider, MD   vitamin D (ERGOCALCIFEROL) 1.25 MG (27861 UT) CAPS capsule  11/13/20  Yes Historical Provider, MD   BD DISP NEEDLE 23G X 1\" [address] [facility]  11/16/20  Yes Historical Provider, MD   testosterone cypionate (DEPOTESTOTERONE CYPIONATE) 200 MG/ML injection  12/21/20  Yes Historical Provider, MD   loratadine (CLARITIN) 10 MG tablet Claritin TABS   Refills: 0    Active   Yes Historical Provider, MD   levothyroxine (SYNTHROID) 50 MCG tablet Take 50 mcg by mouth Daily   Yes Historical Provider, MD   Ferrous Sulfate (IRON PO) Take by mouth   Yes Historical Provider, MD   emtricitabine-tenofovir alafenamide (DESCOVY) 200-25 MG TABS tablet Take 1 tablet by mouth daily   Yes Historical Provider, MD   atorvastatin (LIPITOR) 10 MG tablet TAKE 1 TABLET BY MOUTH ONE TIME A DAY 11/26/19  Yes Historical Provider, MD   vilazodone HCl (VIIBRYD) 20 MG TABS Take 40 mg by mouth   Yes Historical Provider, MD       Review of Systems    Review of Systems:  History obtained from chart review and the patient  General ROS: negative for - chills, fatigue, fever, night sweats or weight gain  Constitutional: Negative for chills, diaphoresis, fatigue, fever and unexpected weight change. Musculoskeletal: Positive for arthralgias, gait problem and joint swelling. Neurological ROS: negative for - behavioral changes, confusion, headaches or seizures. Negative for weakness and numbness. Dermatological ROS: negative for - mole changes, rash  Cardiovascular: Negative for leg swelling. Gastrointestinal: Negative for constipation, diarrhea, nausea and vomiting. Lower Extremity Physical Examination:     Vitals: There were no vitals filed for this visit. General: AAO x 3 in NAD. Plan:  Custom molded orthotics were dispensed today.  Patient was given specific instructions, verbally and written, on the proper care and wear of the custom orthotics. The patient was instructed on the proper shoes in which to were the orthotics and will slowly increase the time in which they wear the orthotics. We discussed that wearing the orthotics can change their walking pattern and change their posture. The orthotics can cause knee, hip, or back pain during the break in process and will resolve over time. Patient will follow up in 2-4 weeks for an orthotic follow up. No orders of the defined types were placed in this encounter. No orders of the defined types were placed in this encounter. RTC in 2month(s).     5/2/2022      Electronically signed by Karl Mock DPM on 5/2/2022 at 2:54 PM  5/2/2022

## 2022-05-02 NOTE — PATIENT INSTRUCTIONS
Schedule a Vaccine  When you qualify to receive the vaccine, call the [facility]) COVID-19 Vaccination Hotline to schedule your appointment or to get additional information about the [facility]) locations which are offering the COVID-19 vaccine. To be 94% effective, it's important that you receive two doses of one of the COVID-19 vaccines. -If you are receiving the Zuniga Peter vaccine, your second shot will be scheduled as close to 21 days after the first shot as possible. -If you are receiving the Moderna vaccine, your second shot will be scheduled as close to 28 days after the first shot as possible. [facility]) COVID-19 Vaccination Hotline: [phone]    Links to [facility]) website and [facility] website:    [email or web address]    [email or web address]

## 2022-05-03 ENCOUNTER — OFFICE VISIT (OUTPATIENT)
Dept: FAMILY MEDICINE CLINIC | Age: 27
End: 2022-05-03
Payer: COMMERCIAL

## 2022-05-03 VITALS
HEIGHT: 62 IN | DIASTOLIC BLOOD PRESSURE: 68 MMHG | WEIGHT: 193 LBS | BODY MASS INDEX: 35.51 KG/M2 | HEART RATE: 69 BPM | OXYGEN SATURATION: 96 % | SYSTOLIC BLOOD PRESSURE: 105 MMHG

## 2022-05-03 DIAGNOSIS — Z79.899 ON STATIN THERAPY: Primary | ICD-10-CM

## 2022-05-03 DIAGNOSIS — E03.9 HYPOTHYROIDISM, UNSPECIFIED TYPE: ICD-10-CM

## 2022-05-03 DIAGNOSIS — K21.9 GASTROESOPHAGEAL REFLUX DISEASE WITHOUT ESOPHAGITIS: ICD-10-CM

## 2022-05-03 DIAGNOSIS — E78.5 HYPERLIPIDEMIA, UNSPECIFIED HYPERLIPIDEMIA TYPE: ICD-10-CM

## 2022-05-03 DIAGNOSIS — R07.9 CHEST PAIN, UNSPECIFIED TYPE: ICD-10-CM

## 2022-05-03 DIAGNOSIS — E55.9 VITAMIN D DEFICIENCY: ICD-10-CM

## 2022-05-03 PROBLEM — S06.0X0A CONCUSSION WITH NO LOSS OF CONSCIOUSNESS: Status: RESOLVED | Noted: 2022-04-08 | Resolved: 2022-05-03

## 2022-05-03 PROCEDURE — G8427 DOCREV CUR MEDS BY ELIG CLIN: HCPCS | Performed by: INTERNAL MEDICINE

## 2022-05-03 PROCEDURE — 1036F TOBACCO NON-USER: CPT | Performed by: INTERNAL MEDICINE

## 2022-05-03 PROCEDURE — G8417 CALC BMI ABV UP PARAM F/U: HCPCS | Performed by: INTERNAL MEDICINE

## 2022-05-03 PROCEDURE — 99214 OFFICE O/P EST MOD 30 MIN: CPT | Performed by: INTERNAL MEDICINE

## 2022-05-03 RX ORDER — FLUTICASONE PROPIONATE 50 MCG
2 SPRAY, SUSPENSION (ML) NASAL DAILY
Qty: 48 G | Refills: 1 | Status: SHIPPED | OUTPATIENT
Start: 2022-05-03

## 2022-05-03 ASSESSMENT — ENCOUNTER SYMPTOMS
GASTROINTESTINAL NEGATIVE: 1
EYES NEGATIVE: 1
RESPIRATORY NEGATIVE: 1
ALLERGIC/IMMUNOLOGIC NEGATIVE: 1
ROS SKIN COMMENTS: WART ON FOOT

## 2022-05-03 NOTE — PROGRESS NOTES
[address]  [address] [facility]  [facility] 36.      Date of Visit:  5/3/2022  Patient Name: Margy De La Rosa   Patient :  1995     CHIEF COMPLAINT:     Margy De La Rosa is a 23 y.o. adult who presents today for an general visit to be evaluated for the following condition(s):  Chief Complaint   Patient presents with    Mass     Right foot poss mole. REVIEW OF SYSTEM      Review of Systems   Constitutional: Negative. HENT: Negative. Eyes: Negative. Respiratory: Negative. Cardiovascular: Positive for chest pain. Gastrointestinal: Negative. Endocrine: Negative. Genitourinary: Negative. Musculoskeletal: Negative. Skin: Negative. Wart on foot   Allergic/Immunologic: Negative. Neurological: Negative. Hematological: Negative. Psychiatric/Behavioral: Negative. All other systems reviewed and are negative. HISTORY OF PRESENT ILLNESS     HPI  Patient here for follow up appt, medication review and lab review. Overall doing well. Has had a few episodes of chest pain in the last 6 weeks. This has been an on and off issue for months. REVIEWED INFORMATION      Allergies   Allergen Reactions    Bupropion      Unknown Reaction.     Adhesive Tape Dermatitis and Rash       Patient Active Problem List   Diagnosis    Depression with anxiety    Excessive daytime sleepiness    Flat feet, bilateral    Pain in both feet    Hypothyroidism    Vitamin D deficiency    On statin therapy    Hyperlipidemia    Other fatigue    Diarrhea    Gastroesophageal reflux disease without esophagitis    Allergic rhinitis    Dermatitis    Congenital pes planus    Concussion with no loss of consciousness    Arachnoid cyst       Past Medical History:   Diagnosis Date    Depression        Past Surgical History:   Procedure Laterality Date    MASTECTOMY Bilateral 2021  WISDOM TOOTH EXTRACTION          Social History     Socioeconomic History    Marital status: Single     Spouse name: Not on file    Number of children: Not on file    Years of education: Not on file    Highest education level: Not on file   Occupational History    Not on file   Tobacco Use    Smoking status: Never Smoker    Smokeless tobacco: Never Used   Substance and Sexual Activity    Alcohol use: No    Drug use: No    Sexual activity: Not on file   Other Topics Concern    Not on file   Social History Narrative    Not on file     Social Determinants of Health     Financial Resource Strain: Unknown    Difficulty of Paying Living Expenses: Patient refused   Food Insecurity: Unknown    Worried About Running Out of Food in the Last Year: Patient refused    [address] in the Last Year: Patient refused   Transportation Needs:     Lack of Transportation (Medical): Not on file    Lack of Transportation (Non-Medical):  Not on file   Physical Activity:     Days of Exercise per Week: Not on file    Minutes of Exercise per Session: Not on file   Stress:     Feeling of Stress : Not on file   Social Connections:     Frequency of Communication with Friends and Family: Not on file    Frequency of Social Gatherings with Friends and Family: Not on file    Attends Islam Services: Not on file    Active Member of [address] [place] [address] [employer] or Organizations: Not on file    Attends Club or Organization Meetings: Not on file    Marital Status: Not on file   Intimate Partner Violence:     Fear of Current or Ex-Partner: Not on file    Emotionally Abused: Not on file    Physically Abused: Not on file    Sexually Abused: Not on file   Housing Stability:     Unable to Pay for Housing in the Last Year: Not on file    Number of [place] in the Last Year: Not on file    Unstable Housing in the Last Year: Not on file        Family History   Problem Relation Age of Onset    Cancer Mother 48        Cervical    Alcohol Abuse Mother     Substance Abuse Mother     Alcohol Abuse Father     Substance Abuse Father        PHYSICAL EXAM     /68   Pulse 69   Ht 5' 2\" (1.575 m)   Wt 193 lb (87.5 kg)   SpO2 96%   BMI 35.30 kg/m²    Physical Exam  Vitals and nursing note reviewed. Constitutional:       Appearance: Normal appearance. HENT:      Head: Normocephalic and atraumatic. Right Ear: Tympanic membrane, ear canal and external ear normal.      Left Ear: Tympanic membrane, ear canal and external ear normal.      Nose: Nose normal.      Mouth/Throat:      Mouth: Mucous membranes are moist.   Eyes:      Extraocular Movements: Extraocular movements intact. Conjunctiva/sclera: Conjunctivae normal.      Pupils: Pupils are equal, round, and reactive to light. Cardiovascular:      Rate and Rhythm: Normal rate and regular rhythm. Pulses: Normal pulses. Heart sounds: Normal heart sounds. Pulmonary:      Effort: Pulmonary effort is normal.      Breath sounds: Normal breath sounds. Abdominal:      General: Abdomen is flat. Bowel sounds are normal.      Palpations: Abdomen is soft. Musculoskeletal:         General: Normal range of motion. Cervical back: Normal range of motion and neck supple. Skin:     General: Skin is warm. Capillary Refill: Capillary refill takes less than 2 seconds. Comments: Likely plantar wart on foot   Neurological:      General: No focal deficit present. Mental Status: He is alert and oriented to person, place, and time. Psychiatric:         Mood and Affect: Mood normal.         Behavior: Behavior normal.         Thought Content: Thought content normal.         Judgment: Judgment normal.         ASSESSMENT/PLAN     1. On statin therapy    2. Vitamin D deficiency    3. Hypothyroidism, unspecified type    4. Hyperlipidemia, unspecified hyperlipidemia type    5. Gastroesophageal reflux disease without esophagitis    6.  Chest pain, unspecified type  - EKG 12 Lead; Future  - Echocardiogram complete; Future    Records reviewed    Labs reviewed    Chest pain - on and off - discussed options, patient would like to see cardiology, referral placed    Likely plantar wart - to see podiatry    Incidental finding of arachnoid cyst on CT scan completed In ED on April 2nd. Ct scan also showed possible cortical atrophy - patient states he does have a history of migraine headaches. He had a CT scan completed at [facility] he believes in 2015. Discussed options re the arachnoid cyst at previous visit . Patient would like to see neurology to see if any additional testing is required.   Referral placed at previous visit.     Patient also working with endocrine Dr. Anaid Reddy for gender change on long term testosterone treatments.     Patient is concerned with possible food allergies - some foods seem to make throat feel funny.  Working with allergy - Dr. Osborne Cowden     Patient also with chronic issues of diarrhea and reflux.  Avoid known triggers. Faustina Rowe referral placed at previous visit     Thyroid - cont medication, labs stable, sees endocrine     Lipids - cont med, cont healthy diet, labs stable    COMMUNICATION:       Electronically signed by Norberto Paget, MD on 5/3/2022 at 11:39 AM

## 2022-05-05 ENCOUNTER — OFFICE VISIT (OUTPATIENT)
Dept: PRIMARY CARE CLINIC | Age: 27
End: 2022-05-05
Payer: COMMERCIAL

## 2022-05-05 ENCOUNTER — HOSPITAL ENCOUNTER (OUTPATIENT)
Age: 27
Setting detail: SPECIMEN
Discharge: HOME | End: 2022-05-05

## 2022-05-05 VITALS
DIASTOLIC BLOOD PRESSURE: 78 MMHG | SYSTOLIC BLOOD PRESSURE: 127 MMHG | OXYGEN SATURATION: 96 % | HEART RATE: 92 BPM | TEMPERATURE: 98.6 F

## 2022-05-05 DIAGNOSIS — Z20.822 SUSPECTED COVID-19 VIRUS INFECTION: ICD-10-CM

## 2022-05-05 DIAGNOSIS — J02.0 STREP THROAT: Primary | ICD-10-CM

## 2022-05-05 DIAGNOSIS — J02.9 SORE THROAT: ICD-10-CM

## 2022-05-05 LAB — S PYO AG THROAT QL: POSITIVE

## 2022-05-05 PROCEDURE — 87880 STREP A ASSAY W/OPTIC: CPT | Performed by: FAMILY MEDICINE

## 2022-05-05 PROCEDURE — 1036F TOBACCO NON-USER: CPT | Performed by: FAMILY MEDICINE

## 2022-05-05 PROCEDURE — G8427 DOCREV CUR MEDS BY ELIG CLIN: HCPCS | Performed by: FAMILY MEDICINE

## 2022-05-05 PROCEDURE — 99213 OFFICE O/P EST LOW 20 MIN: CPT | Performed by: FAMILY MEDICINE

## 2022-05-05 PROCEDURE — G8417 CALC BMI ABV UP PARAM F/U: HCPCS | Performed by: FAMILY MEDICINE

## 2022-05-05 RX ORDER — AMOXICILLIN 500 MG/1
500 CAPSULE ORAL 2 TIMES DAILY
Qty: 20 CAPSULE | Refills: 0 | Status: SHIPPED | OUTPATIENT
Start: 2022-05-05 | End: 2022-05-15

## 2022-05-05 ASSESSMENT — ENCOUNTER SYMPTOMS
CHANGE IN BOWEL HABIT: 1
SORE THROAT: 1
COUGH: 0

## 2022-05-05 NOTE — PATIENT INSTRUCTIONS
Patient Education        Strep Throat: Care Instructions  Your Care Instructions     Strep throat is a bacterial infection that causes sudden, severe sore throat and fever. Strep throat, which is caused by bacteria called streptococcus, is treated with antibiotics. Sometimes a strep test is necessary to tell if the sore throat is caused by strep bacteria. Treatment can help ease symptoms andmay prevent future problems. Follow-up care is a key part of your treatment and safety. Be sure to make and go to all appointments, and call your doctor if you are having problems. It's also a good idea to know your test results and keep alist of the medicines you take. How can you care for yourself at home?  Take your antibiotics as directed. Do not stop taking them just because you feel better. You need to take the full course of antibiotics.  Strep throat can spread to others until 24 hours after you begin taking antibiotics. During this time, avoid contact with other people at work, school, or home, especially infants and children. Do not sneeze or cough on others, and wash your hands often. Keep your drinking glass and eating utensils separate from those of others. Wash these items well in hot, soapy water.  Gargle with warm salt water at least once each hour to help reduce swelling and make your throat feel better. Use 1 teaspoon of salt mixed in 8 fluid ounces of warm water.  Take an over-the-counter pain medication, such as acetaminophen (Tylenol), ibuprofen (Advil, Motrin), or naproxen (Aleve). Read and follow all instructions on the label.  Try an over-the-counter anesthetic throat spray or throat lozenges, which may help relieve throat pain.  Drink plenty of fluids. Fluids may help soothe an irritated throat. Hot fluids, such as tea or soup, may help your throat feel better.  Eat soft solids and drink plenty of clear liquids.  Flavored ice pops, ice cream, scrambled eggs, sherbet, and gelatin dessert (such as Jell-O) may also soothe the throat.  Get lots of rest.   Do not smoke, and avoid secondhand smoke. If you need help quitting, talk to your doctor about stop-smoking programs and medicines. These can increase your chances of quitting for good.  Use a vaporizer or humidifier to add moisture to the air in your bedroom. Follow the directions for cleaning the machine. When should you call for help? Call your doctor now or seek immediate medical care if:     You have a new or higher fever.      You have a fever with a stiff neck or severe headache.      You have new or worse trouble swallowing.      Your sore throat gets much worse on one side.      Your pain becomes much worse on one side of your throat. Watch closely for changes in your health, and be sure to contact your doctor if:     You are not getting better after 2 days (48 hours).      You do not get better as expected. Where can you learn more? Go to https://[employer]. org and sign in to your [employer] account. Enter K625 in the [employer] box to learn more about \"Strep Throat: Care Instructions. \"     If you do not have an account, please click on the \"Sign Up Now\" link. Current as of: September 8, 2021               Content Version: 13.2  © 2006-2022 [employer]wise, Incorporated. Care instructions adapted under license by [facility] ([facility]). If you have questions about a medical condition or this instruction, always ask your healthcare professional. [place] [address] any warranty or liability for your use of this information. Strep test in office positive for strep throat. Take antibiotic as prescribed. Continue over the counter cough/cold medications as needed for symptoms  If symptoms worsen or do not improve please follow-up with PCP or return to clinic

## 2022-05-05 NOTE — PROGRESS NOTES
[address] [facility]  [address] [place] [address]  Dept: [phone]  Dept Fax: [phone]    Jessica Gilmore is a 23 y.o. adult who presents today for his medical conditions/complaintsas noted below. Jessica Gilmore is c/o of Pharyngitis (with sinus pressure - started approx 2 days ago)        HPI:     Pharyngitis  This is a new problem. The current episode started in the past 7 days (2 days). The problem occurs constantly. The problem has been unchanged. Associated symptoms include a change in bowel habit (chronic), congestion and a sore throat. Pertinent negatives include no chills, coughing or fever. Nothing aggravates the symptoms. He has tried acetaminophen for the symptoms. The treatment provided mild relief.    Family members also sick  Past medical history of allergic rhinitis  Past Medical History:   Diagnosis Date    Concussion with no loss of consciousness 4/8/2022    Depression     Past medical history reviewed and pertinent positives/negatives in the HPI    Past Surgical History:   Procedure Laterality Date    MASTECTOMY Bilateral 01/20/2021    WISDOM TOOTH EXTRACTION         Family History   Problem Relation Age of Onset    Cancer Mother 48        Cervical    Alcohol Abuse Mother     Substance Abuse Mother     Alcohol Abuse Father     Substance Abuse Father        Social History     Tobacco Use    Smoking status: Never Smoker    Smokeless tobacco: Never Used   Substance Use Topics    Alcohol use: No      Current Outpatient Medications   Medication Sig Dispense Refill    fluticasone (FLONASE) 50 MCG/ACT nasal spray 2 sprays by Each Nostril route daily 48 g 1    cimetidine (TAGAMET) 800 MG tablet Take 1 tablet by mouth 2 times daily 90 tablet 3    ketoconazole (NIZORAL) 2 % shampoo Apply 3-4 times weekly to scalp, leave on for five minutes prior to washing off 120 mL 2    triamcinolone (KENALOG) 0.1 % cream Apply to rash twice daily (not face, armpit or groin) 80 g 1    fluocinonide (LIDEX) 0.05 % external solution Apply to scalp daily for rash 60 mL 2    B-D 3CC LUER-LENA SYR 92UV6-7/2 22G X 1-1/2\" 3 ML MISC       busPIRone (BUSPAR) 10 MG tablet 20 mg daily       vitamin D (ERGOCALCIFEROL) 1.25 MG (26125 UT) CAPS capsule       BD DISP NEEDLE 23G X 1\" MISC       testosterone cypionate (DEPOTESTOTERONE CYPIONATE) 200 MG/ML injection       loratadine (CLARITIN) 10 MG tablet Claritin TABS   Refills: 0    Active      levothyroxine (SYNTHROID) 50 MCG tablet Take 50 mcg by mouth Daily      Ferrous Sulfate (IRON PO) Take by mouth      emtricitabine-tenofovir alafenamide (DESCOVY) 200-25 MG TABS tablet Take 1 tablet by mouth daily      atorvastatin (LIPITOR) 10 MG tablet TAKE 1 TABLET BY MOUTH ONE TIME A DAY  3    vilazodone HCl (VIIBRYD) 20 MG TABS Take 40 mg by mouth       No current facility-administered medications for this visit. Allergies   Allergen Reactions    Bupropion      Unknown Reaction.  Adhesive Tape Dermatitis and Rash       Health Maintenance   Topic Date Due    Varicella vaccine (1 of 2 - 2-dose childhood series) Never done    HPV vaccine (1 - 2-dose series) Never done    HIV screen  Never done    Chlamydia screen  Never done    Hepatitis C screen  Never done    DTaP/Tdap/Td vaccine (1 - Tdap) Never done    Pap smear  Never done    Flu vaccine (Season Ended) 09/01/2022    Depression Monitoring  10/22/2022    Lipids  03/10/2023    TSH  03/10/2023    COVID-19 Vaccine  Completed    Hepatitis A vaccine  Aged Out    Hepatitis B vaccine  Aged Out    Hib vaccine  Aged Out    Meningococcal (ACWY) vaccine  Aged Out    Pneumococcal 0-64 years Vaccine  Aged Out       :      Review of Systems   Constitutional: Negative for chills and fever. HENT: Positive for congestion and sore throat. Respiratory: Negative for cough.     Gastrointestinal: Positive for change in bowel habit (chronic). Objective:     Physical Exam  Vitals and nursing note reviewed. Constitutional:       Appearance: Normal appearance. He is obese. HENT:      Head: Normocephalic and atraumatic. Right Ear: Hearing normal.      Left Ear: Hearing normal.      Nose: Nose normal.      Mouth/Throat:      Lips: Pink. Mouth: Mucous membranes are moist.      Pharynx: Oropharynx is clear. Posterior oropharyngeal erythema present. Tonsils: No tonsillar exudate. Eyes:      Extraocular Movements: Extraocular movements intact. Conjunctiva/sclera: Conjunctivae normal.   Cardiovascular:      Rate and Rhythm: Normal rate and regular rhythm. Heart sounds: Normal heart sounds. Pulmonary:      Effort: Pulmonary effort is normal.      Breath sounds: Normal breath sounds. Musculoskeletal:      Cervical back: Normal range of motion. No tenderness. No muscular tenderness. Lymphadenopathy:      Cervical: No cervical adenopathy. Skin:     General: Skin is warm and dry. Neurological:      Mental Status: He is alert and oriented to person, place, and time. Mental status is at baseline. Psychiatric:         Mood and Affect: Mood normal.         Behavior: Behavior normal.         Thought Content: Thought content normal.         Judgment: Judgment normal.       /78   Pulse 92   Temp 98.6 °F (37 °C) (Tympanic)   SpO2 96%     Assessment:       Diagnosis Orders   1. Strep throat     2. Sore throat  POCT rapid strep A       Plan:    Strep test in office positive for strep throat. Take antibiotic as prescribed. Continue over the counter cough/cold medications as needed for symptoms  If symptoms worsen or do not improve please follow-up with PCP or return to clinic     Orders Placed This Encounter   Procedures    POCT rapid strep A     No orders of the defined types were placed in this encounter. Patient given educational materials - see patient instructions.   Discussed use, benefit, and side effects of prescribed medications. All patient questions answered. Pt voiced understanding. Patient agreed with treatment plan. Follow up as directed.      Electronicallysigned by Zoya Wynn MD on 5/5/2022 at 4:16 PM

## 2022-05-06 ENCOUNTER — TELEPHONE (OUTPATIENT)
Dept: FAMILY MEDICINE CLINIC | Age: 27
End: 2022-05-06

## 2022-05-06 DIAGNOSIS — Z20.822 SUSPECTED COVID-19 VIRUS INFECTION: ICD-10-CM

## 2022-05-06 LAB
CALPROTECTIN, FECAL: 40 UG/G
SARS-COV-2: ABNORMAL
SARS-COV-2: DETECTED
SOURCE: ABNORMAL

## 2022-05-06 NOTE — TELEPHONE ENCOUNTER
Patient is covid positive and needs to reschedule surgery scheduled for 5/10/22. I did let patient know that the office would call him back.

## 2022-05-06 NOTE — TELEPHONE ENCOUNTER
Patient called and stated he has tested positive for Covid and isn't feeling well. Pt notified will need 10 days symptom free prior to rescheduling. Pt v/u and will call when ready to proceed.     PBG surgery scheduling

## 2022-05-23 ENCOUNTER — OFFICE VISIT (OUTPATIENT)
Dept: OBGYN CLINIC | Age: 27
End: 2022-05-23
Payer: COMMERCIAL

## 2022-05-23 ENCOUNTER — HOSPITAL ENCOUNTER (OUTPATIENT)
Age: 27
Setting detail: SPECIMEN
Discharge: HOME | End: 2022-05-23

## 2022-05-23 VITALS
WEIGHT: 187 LBS | HEIGHT: 62 IN | SYSTOLIC BLOOD PRESSURE: 116 MMHG | BODY MASS INDEX: 34.41 KG/M2 | DIASTOLIC BLOOD PRESSURE: 81 MMHG | HEART RATE: 64 BPM

## 2022-05-23 DIAGNOSIS — Z01.419 WOMEN'S ANNUAL ROUTINE GYNECOLOGICAL EXAMINATION: Primary | ICD-10-CM

## 2022-05-23 DIAGNOSIS — Z76.89 ENCOUNTER TO ESTABLISH CARE WITH NEW DOCTOR: ICD-10-CM

## 2022-05-23 DIAGNOSIS — Z78.9 FEMALE-TO-MALE TRANSGENDER PERSON: ICD-10-CM

## 2022-05-23 PROCEDURE — 99385 PREV VISIT NEW AGE 18-39: CPT | Performed by: OBSTETRICS & GYNECOLOGY

## 2022-05-23 ASSESSMENT — ENCOUNTER SYMPTOMS
ABDOMINAL PAIN: 0
SHORTNESS OF BREATH: 0
COUGH: 0
BACK PAIN: 0

## 2022-05-23 NOTE — PROGRESS NOTES
[address] [facility] - [address] [address] [facility]  Dept: [phone]    Chief complaint:   Chief Complaint   Patient presents with    New Patient     last pap 12/2019 WN       History Present Illness: Merritt Masters is a 24 yo female who has transitioned into a male. He is receiving testosterone shots. He started his testosterone 4 years ago. He denies any unwanted side effects of the medication. He had a double mastectomy last January. His mom has a h/o cervical cancer and apparently it is back recently. He follows with an endocrinologist and a psychiatrist as well as a therapist.  He made the decision to transition between age [age]. He doesn't have periods with his medication. He had one period after starting testosterone, but none since. He went through menarche around 12-14. He had painful periods and heavy bleeding with clots when he had periods. He has had male/female/trans partners. He participates in oral and anal sex. He does use protection and is on descovy to protect against HIV. He is planning on genital reassignment surgery in the future. He has in the past had some vulvar pain/irritation but it has resolved. He denies any bowel or bladder issues.      Current Medications (OTC/Herbal):   Current Outpatient Medications   Medication Sig Dispense Refill    fluticasone (FLONASE) 50 MCG/ACT nasal spray 2 sprays by Each Nostril route daily 48 g 1    ketoconazole (NIZORAL) 2 % shampoo Apply 3-4 times weekly to scalp, leave on for five minutes prior to washing off 120 mL 2    triamcinolone (KENALOG) 0.1 % cream Apply to rash twice daily (not face, armpit or groin) 80 g 1    fluocinonide (LIDEX) 0.05 % external solution Apply to scalp daily for rash 60 mL 2    busPIRone (BUSPAR) 10 MG tablet 20 mg daily       vitamin D (ERGOCALCIFEROL) 1.25 MG (53275 UT) CAPS capsule       testosterone cypionate (DEPOTESTOTERONE CYPIONATE) 200 MG/ML injection       loratadine (CLARITIN) 10 MG tablet Claritin TABS   Refills: 0    Active      levothyroxine (SYNTHROID) 50 MCG tablet Take 50 mcg by mouth Daily      Ferrous Sulfate (IRON PO) Take by mouth      emtricitabine-tenofovir alafenamide (DESCOVY) 200-25 MG TABS tablet Take 1 tablet by mouth daily      atorvastatin (LIPITOR) 10 MG tablet TAKE 1 TABLET BY MOUTH ONE TIME A DAY  3    vilazodone HCl (VIIBRYD) 20 MG TABS Take 40 mg by mouth      cimetidine (TAGAMET) 800 MG tablet Take 1 tablet by mouth 2 times daily 90 tablet 3    B-D 3CC LUER-LENA SYR 28KQ1-1/2 22G X 1-1/2\" 3 ML MISC       BD DISP NEEDLE 23G X 1\" MISC        No current facility-administered medications for this visit. Allergies: Allergies   Allergen Reactions    Bupropion      Unknown Reaction.     Adhesive Tape Dermatitis and Rash     Past Medical History:   Past Medical History:   Diagnosis Date    Concussion with no loss of consciousness 2022    Depression      Past Surgical History:   Past Surgical History:   Procedure Laterality Date    MASTECTOMY Bilateral 2021    WISDOM TOOTH EXTRACTION       Obstetric History:   0  Para 0  Gynecologic History: LMP 4 years ago   Menarche [age]    Last Pap: 19       Any history of abnormal paps no    PriorColpo/Biopsy n/a     Last Mammogram n/a  Contraception: none  Complications: none  STDs: none  Psychosocial History: Occupation:   Medical billing and coding   Caffeine No    At risk for depression Yes    Abuse:   Mental abuse  Seatbelt:   Yes  Exercise:  No    Social History     Socioeconomic History    Marital status: Single     Spouse name: Not on file    Number of children: Not on file    Years of education: Not on file    Highest education level: Not on file   Occupational History    Not on file   Tobacco Use    Smoking status: Never Smoker    Smokeless tobacco: Never Used   Substance and Sexual Activity    Alcohol use: No    Drug use: No    Sexual activity: Not on file   Other Topics Concern    Not on file   Social History Narrative    Not on file     Social Determinants of Health     Financial Resource Strain: Unknown    Difficulty of Paying Living Expenses: Patient refused   Food Insecurity: Unknown    Worried About Running Out of Food in the Last Year: Patient refused    [address] in the Last Year: Patient refused   Transportation Needs:     Lack of Transportation (Medical): Not on file    Lack of Transportation (Non-Medical): Not on file   Physical Activity:     Days of Exercise per Week: Not on file    Minutes of Exercise per Session: Not on file   Stress:     Feeling of Stress : Not on file   Social Connections:     Frequency of Communication with Friends and Family: Not on file    Frequency of Social Gatherings with Friends and Family: Not on file    Attends Bahai Services: Not on file    Active Member of [address] [place] [address] [employer] or Organizations: Not on file    Attends Club or Organization Meetings: Not on file    Marital Status: Not on file   Intimate Partner Violence:     Fear of Current or Ex-Partner: Not on file    Emotionally Abused: Not on file    Physically Abused: Not on file    Sexually Abused: Not on file   Housing Stability:     Unable to Pay for Housing in the Last Year: Not on file    Number of [place] in the Last Year: Not on file    Unstable Housing in the Last Year: Not on file       Family History   Problem Relation Age of Onset    Cancer Mother 48        Cervical    Alcohol Abuse Mother     Substance Abuse Mother     Alcohol Abuse Father     Substance Abuse Father        Review of Systems:   Review of Systems   Constitutional: Negative for chills and fever. HENT: Negative for congestion and hearing loss. Respiratory: Negative for cough and shortness of breath. Cardiovascular: Negative for chest pain and palpitations. Gastrointestinal: Negative for abdominal pain. Genitourinary: Negative for pelvic pain and vaginal discharge. Musculoskeletal: Negative for back pain. Neurological: Negative for dizziness and light-headedness. Psychiatric/Behavioral: The patient is not nervous/anxious. Physical exam:  vitals:  Height   5  ft    2 in,  Weight    187 lbs,   116/81 BP  Gen: alert, no apparent distress  HEENT:No pathologic skin lesions noted,NC/AT,PERRL, normal midline nontender thyroid   Lung Exam: Clear to auscultation in all fields bilaterally, without wheezes,rales or rhonchi. Cardiac Exam: Normal sinus rhythm andrate, without murmurs, rubs or gallops appreciated. Breast Exam: Symmetric without pathological skin changes, nontender without discrete suspicious masses palpated, supraclavicular or axillary adenopathy or nipple discharge noted. Bilateral mastectomy scars. Abdominal Exam: Nontender to deep palpation without organomegaly, masses or CVAT appreciated, BS positive. No spinal deformation or tenderness. External Genitalia: Normal development without vulvar,vaginal or cervical lesions noted. Normal vaginal discharge, uterus anterior, 4-6 weeks without CMT. Adnexa nontender without abnormal masses bilaterally. Rectal Exam: Omitted. Extremities: Nontender without clubbing, cyanosis or edema. F.R.O.M. Neurologic Exam: Grossly intact without noted sensorimotor deficits and oriented x 3. Assessment/Plan:   Unremarkable annual Gyn exam.    Cervical Cytology Evaluation begins at 24years old. If Negative Cytology, Follow-up screening per current guidelines. Mammograms every 1year. If 37 yo and last mammogram was negative. Hereditary Breast, Ovarian, Colon and Uterine Cancer screening done. Birth control and barrier recommendations discussed. STD counseling and prevention reviewed. Gardisil counseling completed for all patients 7-35 yo. Received previously  Routine health maintenance per patients PCP.   Pt to follow up for annual exam in 1 year    Ke Chong MD  [address]

## 2022-05-25 ENCOUNTER — TELEPHONE (OUTPATIENT)
Dept: FAMILY MEDICINE CLINIC | Age: 27
End: 2022-05-25

## 2022-05-25 NOTE — TELEPHONE ENCOUNTER
----- Message from Triston Carrillo sent at 5/25/2022  9:24 AM EDT -----  Subject: Results Request    QUESTIONS  Which lab or imaging result is the patient calling about? Bloodwork  Which provider ordered the test? Britney Jefferson   At what location was the test performed? Date the test was performed? 2022-03-14  Additional Information for Provider? Patient is calling in to get his   bloodwork results for their blood type that was done in March. He is   needing a call back   ---------------------------------------------------------------------------  --------------  CALL BACK INFO  What is the best way for the office to contact you? OK to leave message on   voicemail  Preferred Call Back Phone Number? [phone]  ---------------------------------------------------------------------------  --------------  SCRIPT ANSWERS  Relationship to Patient?  Self

## 2022-05-25 NOTE — TELEPHONE ENCOUNTER
Berna Mast - I believe we are waiting on [facility] to send us these results. Can you reach out to [facility] to get results? I believe we have been waiting for a few months on these results.

## 2022-05-25 NOTE — TELEPHONE ENCOUNTER
Yes - the rest of the results are in but when you look at the section of ABO blood type it says sent to [facility] on the results.

## 2022-05-31 DIAGNOSIS — E78.5 HYPERLIPIDEMIA, UNSPECIFIED HYPERLIPIDEMIA TYPE: ICD-10-CM

## 2022-05-31 DIAGNOSIS — E55.9 VITAMIN D DEFICIENCY: ICD-10-CM

## 2022-05-31 DIAGNOSIS — E03.9 HYPOTHYROIDISM, UNSPECIFIED TYPE: ICD-10-CM

## 2022-05-31 DIAGNOSIS — R53.83 OTHER FATIGUE: ICD-10-CM

## 2022-05-31 DIAGNOSIS — Z79.899 ON STATIN THERAPY: ICD-10-CM

## 2022-06-01 LAB — CYTOLOGY REPORT: NORMAL

## 2022-06-20 NOTE — TELEPHONE ENCOUNTER
Procedure rescheduled/Yumiko Mccoy  EGD/Diagnostic colonoscopy  7/6/22  10:30 am   PBG    Patient has bowel prep meds/instructions from previously scheduled procedure. All information/instructions verbalized with patient.

## 2022-06-30 ENCOUNTER — TELEMEDICINE (OUTPATIENT)
Dept: FAMILY MEDICINE CLINIC | Age: 27
End: 2022-06-30
Payer: COMMERCIAL

## 2022-06-30 DIAGNOSIS — G93.0 ARACHNOID CYST: ICD-10-CM

## 2022-06-30 DIAGNOSIS — R07.9 CHEST PAIN, UNSPECIFIED TYPE: ICD-10-CM

## 2022-06-30 DIAGNOSIS — E03.9 HYPOTHYROIDISM, UNSPECIFIED TYPE: ICD-10-CM

## 2022-06-30 DIAGNOSIS — Z76.89 ENCOUNTER TO ESTABLISH CARE: Primary | ICD-10-CM

## 2022-06-30 PROBLEM — G43.909 MIGRAINES: Status: ACTIVE | Noted: 2022-06-30

## 2022-06-30 PROCEDURE — 1036F TOBACCO NON-USER: CPT | Performed by: NURSE PRACTITIONER

## 2022-06-30 PROCEDURE — G8427 DOCREV CUR MEDS BY ELIG CLIN: HCPCS | Performed by: NURSE PRACTITIONER

## 2022-06-30 PROCEDURE — 99214 OFFICE O/P EST MOD 30 MIN: CPT | Performed by: NURSE PRACTITIONER

## 2022-06-30 PROCEDURE — G8417 CALC BMI ABV UP PARAM F/U: HCPCS | Performed by: NURSE PRACTITIONER

## 2022-06-30 ASSESSMENT — ENCOUNTER SYMPTOMS
DIARRHEA: 1
CHEST TIGHTNESS: 0
ABDOMINAL PAIN: 0
SHORTNESS OF BREATH: 0
CONSTIPATION: 0
NAUSEA: 0
VOMITING: 0
SINUS PRESSURE: 0
SINUS PAIN: 0
BACK PAIN: 0
COLOR CHANGE: 0
EYE PAIN: 0

## 2022-06-30 ASSESSMENT — PATIENT HEALTH QUESTIONNAIRE - PHQ9
10. IF YOU CHECKED OFF ANY PROBLEMS, HOW DIFFICULT HAVE THESE PROBLEMS MADE IT FOR YOU TO DO YOUR WORK, TAKE CARE OF THINGS AT HOME, OR GET ALONG WITH OTHER PEOPLE: 0
9. THOUGHTS THAT YOU WOULD BE BETTER OFF DEAD, OR OF HURTING YOURSELF: 0
SUM OF ALL RESPONSES TO PHQ QUESTIONS 1-9: 0
SUM OF ALL RESPONSES TO PHQ9 QUESTIONS 1 & 2: 0
SUM OF ALL RESPONSES TO PHQ QUESTIONS 1-9: 0
8. MOVING OR SPEAKING SO SLOWLY THAT OTHER PEOPLE COULD HAVE NOTICED. OR THE OPPOSITE, BEING SO FIGETY OR RESTLESS THAT YOU HAVE BEEN MOVING AROUND A LOT MORE THAN USUAL: 0
7. TROUBLE CONCENTRATING ON THINGS, SUCH AS READING THE NEWSPAPER OR WATCHING TELEVISION: 0
6. FEELING BAD ABOUT YOURSELF - OR THAT YOU ARE A FAILURE OR HAVE LET YOURSELF OR YOUR FAMILY DOWN: 0
4. FEELING TIRED OR HAVING LITTLE ENERGY: 0
1. LITTLE INTEREST OR PLEASURE IN DOING THINGS: 0
2. FEELING DOWN, DEPRESSED OR HOPELESS: 0
SUM OF ALL RESPONSES TO PHQ QUESTIONS 1-9: 0
5. POOR APPETITE OR OVEREATING: 0
SUM OF ALL RESPONSES TO PHQ QUESTIONS 1-9: 0
3. TROUBLE FALLING OR STAYING ASLEEP: 0

## 2022-06-30 NOTE — PROGRESS NOTES
Jules Moore (:  1995) is a New patient, here for evaluation of the following:    Assessment & Plan   Below is the assessment and plan developed based on review of pertinent history, physical exam, labs, studies, and medications. 1. Encounter to establish care  2. Hypothyroidism, unspecified type  Comments:  Managed by endocrinolgoist.  3. Arachnoid cyst  Comments: Follow up with neurology as scheduled. 4. Chest pain, unspecified type  Comments: Follow up pending 2d echo results. Return in about 6 months (around 2022) for 6 month follow up. Subjective   Presents via [employer] for telehealth visit to establish care   Currently in relationship with male partner   Previous PCP Dr. Carlos Baird, no longer seeing patients - she managed flonase     Eyes: Appointment in August   Dentist: Updated exam this month    Established with psychiatry and endocrinology - they manage most of his medication/hormone therapy, sees them every 3-6 months   Will be pursuing gender reassignment surgery at some point     GYN: Has to repeat PAP d/t insufficient cells  No hx abnormal paps  Does not have period     GI: Will be having colonoscopy/EGD soon   D/t chronic diarrhea, GERD    Will be completing echo of heart d/t left sided chest pain  Reports chest pain has been on going - not any worse than before     Migraines: Has always struggled with this  Heat makes them worse   Takes advil/tylenol - typically works, otherwise will sleep off   Handles them well on his own   Incidental archnoid cyst - will be seeing neurology soon     Fatigue: Chronic since he was a child  Has completed sleep studies in the past  Never been able to find any answers     Recently updated bloodwork  Denies any other problems/concerns at this time         Review of Systems   Constitutional: Positive for fatigue. Negative for activity change, chills and unexpected weight change.    HENT: Negative for congestion, hearing loss, postnasal drip, sinus pressure and sinus pain. Eyes: Negative for pain and visual disturbance. Respiratory: Negative for chest tightness and shortness of breath. Cardiovascular: Positive for chest pain. Negative for palpitations. Gastrointestinal: Positive for diarrhea. Negative for abdominal pain, constipation, nausea and vomiting. Endocrine: Negative for polydipsia, polyphagia and polyuria. Genitourinary: Negative for dysuria, flank pain, frequency and urgency. Musculoskeletal: Negative for arthralgias, back pain and myalgias. Skin: Negative for color change and rash. Neurological: Positive for headaches. Negative for dizziness, weakness, light-headedness and numbness. Psychiatric/Behavioral: Negative for confusion, hallucinations, self-injury, sleep disturbance and suicidal ideas. The patient is not nervous/anxious.            Objective   Patient-Reported Vitals  No data recorded     Physical Exam  [INSTRUCTIONS:  \"[x]\" Indicates a positive item  \"[]\" Indicates a negative item  -- DELETE ALL ITEMS NOT EXAMINED]    Constitutional: [x] Appears well-developed and well-nourished [x] No apparent distress      [] Abnormal -     Mental status: [x] Alert and awake  [x] Oriented to person/place/time [x] Able to follow commands    [] Abnormal -     Eyes:   EOM    [x]  Normal    [] Abnormal -   Sclera  [x]  Normal    [] Abnormal -          Discharge [x]  None visible   [] Abnormal -     HENT: [x] Normocephalic, atraumatic  [] Abnormal -   [x] Mouth/Throat: Mucous membranes are moist    External Ears [x] Normal  [] Abnormal -    Neck: [x] No visualized mass [] Abnormal -     Pulmonary/Chest: [x] Respiratory effort normal   [x] No visualized signs of difficulty breathing or respiratory distress        [] Abnormal -      Musculoskeletal:   [x] Normal gait with no signs of ataxia         [x] Normal range of motion of neck        [] Abnormal -     Neurological:        [x] No Facial Asymmetry (Cranial nerve 7 motor function) (limited exam due to video visit)          [x] No gaze palsy        [] Abnormal -          Skin:        [x] No significant exanthematous lesions or discoloration noted on facial skin         [] Abnormal -            Psychiatric:       [x] Normal Affect [] Abnormal -        [x] No Hallucinations    Other pertinent observable physical exam findings:- N/A    On this date 6/30/2022 I have spent 30 minutes reviewing previous notes, test results and face to face with the patient discussing the diagnosis and importance of compliance with the treatment plan as well as documenting on the day of the visit. Wt Readings from Last 3 Encounters:   05/23/22 187 lb (84.8 kg)   05/03/22 193 lb (87.5 kg)   05/02/22 190 lb (86.2 kg)     Temp Readings from Last 3 Encounters:   05/05/22 98.6 °F (37 °C) (Tympanic)   04/02/22 98.6 °F (37 °C) (Oral)   03/11/22 98.4 °F (36.9 °C)     BP Readings from Last 3 Encounters:   05/23/22 116/81   05/05/22 127/78   05/03/22 105/68     Pulse Readings from Last 3 Encounters:   05/23/22 64   05/05/22 92   05/03/22 69         Karon Monroy, was evaluated through a synchronous (real-time) audio-video encounter. The patient (or guardian if applicable) is aware that this is a billable service, which includes applicable co-pays. This Virtual Visit was conducted with patient's (and/or legal guardian's) consent. The visit was conducted pursuant to the emergency declaration under the [facility], [address] [place] [address] authority and the [employer] and [employer] General Act. Patient identification was verified, and a caregiver was present when appropriate. The patient was located at Home: [address] [place] [address]. Provider was located at [facility] (Appt Dept): [address] [address].         --IONA Warner NP

## 2022-07-05 ENCOUNTER — ANESTHESIA EVENT (OUTPATIENT)
Dept: OPERATING ROOM | Age: 27
End: 2022-07-05
Payer: COMMERCIAL

## 2022-07-06 ENCOUNTER — HOSPITAL ENCOUNTER (OUTPATIENT)
Age: 27
Setting detail: OUTPATIENT SURGERY
Discharge: HOME | End: 2022-07-06
Attending: INTERNAL MEDICINE | Admitting: INTERNAL MEDICINE
Payer: COMMERCIAL

## 2022-07-06 ENCOUNTER — ANESTHESIA (OUTPATIENT)
Dept: OPERATING ROOM | Age: 27
End: 2022-07-06
Payer: COMMERCIAL

## 2022-07-06 VITALS
OXYGEN SATURATION: 99 % | WEIGHT: 188 LBS | HEART RATE: 71 BPM | RESPIRATION RATE: 19 BRPM | HEIGHT: 62 IN | TEMPERATURE: 98.2 F | SYSTOLIC BLOOD PRESSURE: 107 MMHG | DIASTOLIC BLOOD PRESSURE: 77 MMHG | BODY MASS INDEX: 34.6 KG/M2

## 2022-07-06 DIAGNOSIS — R19.7 ACUTE DIARRHEA: ICD-10-CM

## 2022-07-06 DIAGNOSIS — R10.13 DYSPEPSIA: ICD-10-CM

## 2022-07-06 LAB — HCG, PREGNANCY URINE (POC): NEGATIVE

## 2022-07-06 PROCEDURE — 3609012400 HC EGD TRANSORAL BIOPSY SINGLE/MULTIPLE: Performed by: INTERNAL MEDICINE

## 2022-07-06 PROCEDURE — 7100000000 HC PACU RECOVERY - FIRST 15 MIN: Performed by: INTERNAL MEDICINE

## 2022-07-06 PROCEDURE — 2580000003 HC RX 258: Performed by: ANESTHESIOLOGY

## 2022-07-06 PROCEDURE — 2500000003 HC RX 250 WO HCPCS: Performed by: NURSE ANESTHETIST, CERTIFIED REGISTERED

## 2022-07-06 PROCEDURE — 45380 COLONOSCOPY AND BIOPSY: CPT | Performed by: INTERNAL MEDICINE

## 2022-07-06 PROCEDURE — 3700000000 HC ANESTHESIA ATTENDED CARE: Performed by: INTERNAL MEDICINE

## 2022-07-06 PROCEDURE — 6360000002 HC RX W HCPCS: Performed by: NURSE ANESTHETIST, CERTIFIED REGISTERED

## 2022-07-06 PROCEDURE — 3700000001 HC ADD 15 MINUTES (ANESTHESIA): Performed by: INTERNAL MEDICINE

## 2022-07-06 PROCEDURE — 7100000011 HC PHASE II RECOVERY - ADDTL 15 MIN: Performed by: INTERNAL MEDICINE

## 2022-07-06 PROCEDURE — 81025 URINE PREGNANCY TEST: CPT

## 2022-07-06 PROCEDURE — 2709999900 HC NON-CHARGEABLE SUPPLY: Performed by: INTERNAL MEDICINE

## 2022-07-06 PROCEDURE — 3609010300 HC COLONOSCOPY W/BIOPSY SINGLE/MULTIPLE: Performed by: INTERNAL MEDICINE

## 2022-07-06 PROCEDURE — 7100000001 HC PACU RECOVERY - ADDTL 15 MIN: Performed by: INTERNAL MEDICINE

## 2022-07-06 PROCEDURE — 88305 TISSUE EXAM BY PATHOLOGIST: CPT

## 2022-07-06 PROCEDURE — 7100000010 HC PHASE II RECOVERY - FIRST 15 MIN: Performed by: INTERNAL MEDICINE

## 2022-07-06 PROCEDURE — 43239 EGD BIOPSY SINGLE/MULTIPLE: CPT | Performed by: INTERNAL MEDICINE

## 2022-07-06 PROCEDURE — 88342 IMHCHEM/IMCYTCHM 1ST ANTB: CPT

## 2022-07-06 RX ORDER — PROPOFOL 10 MG/ML
INJECTION, EMULSION INTRAVENOUS PRN
Status: DISCONTINUED | OUTPATIENT
Start: 2022-07-06 | End: 2022-07-06 | Stop reason: SDUPTHER

## 2022-07-06 RX ORDER — SODIUM CHLORIDE 9 MG/ML
25 INJECTION, SOLUTION INTRAVENOUS PRN
Status: DISCONTINUED | OUTPATIENT
Start: 2022-07-06 | End: 2022-07-06 | Stop reason: HOSPADM

## 2022-07-06 RX ORDER — LIDOCAINE HYDROCHLORIDE 20 MG/ML
INJECTION, SOLUTION INFILTRATION; PERINEURAL PRN
Status: DISCONTINUED | OUTPATIENT
Start: 2022-07-06 | End: 2022-07-06 | Stop reason: SDUPTHER

## 2022-07-06 RX ORDER — DIPHENHYDRAMINE HYDROCHLORIDE 50 MG/ML
12.5 INJECTION INTRAMUSCULAR; INTRAVENOUS
Status: DISCONTINUED | OUTPATIENT
Start: 2022-07-06 | End: 2022-07-06 | Stop reason: HOSPADM

## 2022-07-06 RX ORDER — SODIUM CHLORIDE 0.9 % (FLUSH) 0.9 %
5-40 SYRINGE (ML) INJECTION EVERY 12 HOURS SCHEDULED
Status: DISCONTINUED | OUTPATIENT
Start: 2022-07-06 | End: 2022-07-06 | Stop reason: HOSPADM

## 2022-07-06 RX ORDER — ONDANSETRON 2 MG/ML
4 INJECTION INTRAMUSCULAR; INTRAVENOUS
Status: DISCONTINUED | OUTPATIENT
Start: 2022-07-06 | End: 2022-07-06 | Stop reason: HOSPADM

## 2022-07-06 RX ORDER — SODIUM CHLORIDE, SODIUM LACTATE, POTASSIUM CHLORIDE, CALCIUM CHLORIDE 600; 310; 30; 20 MG/100ML; MG/100ML; MG/100ML; MG/100ML
INJECTION, SOLUTION INTRAVENOUS CONTINUOUS
Status: DISCONTINUED | OUTPATIENT
Start: 2022-07-06 | End: 2022-07-06 | Stop reason: HOSPADM

## 2022-07-06 RX ORDER — LIDOCAINE HYDROCHLORIDE 10 MG/ML
1 INJECTION, SOLUTION EPIDURAL; INFILTRATION; INTRACAUDAL; PERINEURAL
Status: DISCONTINUED | OUTPATIENT
Start: 2022-07-06 | End: 2022-07-06 | Stop reason: HOSPADM

## 2022-07-06 RX ORDER — SODIUM CHLORIDE 0.9 % (FLUSH) 0.9 %
5-40 SYRINGE (ML) INJECTION PRN
Status: DISCONTINUED | OUTPATIENT
Start: 2022-07-06 | End: 2022-07-06 | Stop reason: HOSPADM

## 2022-07-06 RX ORDER — SODIUM CHLORIDE 9 MG/ML
INJECTION, SOLUTION INTRAVENOUS PRN
Status: DISCONTINUED | OUTPATIENT
Start: 2022-07-06 | End: 2022-07-06 | Stop reason: HOSPADM

## 2022-07-06 RX ORDER — PROPOFOL 10 MG/ML
INJECTION, EMULSION INTRAVENOUS CONTINUOUS PRN
Status: DISCONTINUED | OUTPATIENT
Start: 2022-07-06 | End: 2022-07-06 | Stop reason: SDUPTHER

## 2022-07-06 RX ORDER — MORPHINE SULFATE 1 MG/ML
1 INJECTION, SOLUTION EPIDURAL; INTRATHECAL; INTRAVENOUS EVERY 5 MIN PRN
Status: DISCONTINUED | OUTPATIENT
Start: 2022-07-06 | End: 2022-07-06 | Stop reason: HOSPADM

## 2022-07-06 RX ORDER — MEPERIDINE HYDROCHLORIDE 50 MG/ML
12.5 INJECTION INTRAMUSCULAR; INTRAVENOUS; SUBCUTANEOUS ONCE
Status: DISCONTINUED | OUTPATIENT
Start: 2022-07-06 | End: 2022-07-06 | Stop reason: HOSPADM

## 2022-07-06 RX ORDER — GLYCOPYRROLATE 1 MG/5 ML
SYRINGE (ML) INTRAVENOUS PRN
Status: DISCONTINUED | OUTPATIENT
Start: 2022-07-06 | End: 2022-07-06 | Stop reason: SDUPTHER

## 2022-07-06 RX ADMIN — PROPOFOL 30 MG: 10 INJECTION, EMULSION INTRAVENOUS at 09:03

## 2022-07-06 RX ADMIN — Medication 0.2 MG: at 08:51

## 2022-07-06 RX ADMIN — PROPOFOL 30 MG: 10 INJECTION, EMULSION INTRAVENOUS at 09:06

## 2022-07-06 RX ADMIN — PROPOFOL 100 MG: 10 INJECTION, EMULSION INTRAVENOUS at 08:59

## 2022-07-06 RX ADMIN — SODIUM CHLORIDE, POTASSIUM CHLORIDE, SODIUM LACTATE AND CALCIUM CHLORIDE: 600; 310; 30; 20 INJECTION, SOLUTION INTRAVENOUS at 08:19

## 2022-07-06 RX ADMIN — LIDOCAINE HYDROCHLORIDE 100 MG: 20 INJECTION, SOLUTION INFILTRATION; PERINEURAL at 08:59

## 2022-07-06 RX ADMIN — PROPOFOL 130 MCG/KG/MIN: 10 INJECTION, EMULSION INTRAVENOUS at 09:06

## 2022-07-06 RX ADMIN — PROPOFOL 50 MG: 10 INJECTION, EMULSION INTRAVENOUS at 09:01

## 2022-07-06 ASSESSMENT — PAIN - FUNCTIONAL ASSESSMENT: PAIN_FUNCTIONAL_ASSESSMENT: NONE - DENIES PAIN

## 2022-07-06 NOTE — ANESTHESIA PRE PROCEDURE
Department of Anesthesiology  Preprocedure Note       Name:  Nina Stringer   Age:  23 y.o.  :  1995                                          MRN:  [id number]         Date:  2022      Surgeon: Shun Beverly):  Everardo Le MD    Procedure: Procedure(s):  EGD BIOPSY  COLONOSCOPY DIAGNOSTIC    Medications prior to admission:   Prior to Admission medications    Medication Sig Start Date End Date Taking?  Authorizing Provider   fluticasone (FLONASE) 50 MCG/ACT nasal spray 2 sprays by Each Nostril route daily 5/3/22   Chelle Branch MD   cimetidine (TAGAMET) 800 MG tablet Take 1 tablet by mouth 2 times daily 22  Everardo Le MD   ketoconazole (NIZORAL) 2 % shampoo Apply 3-4 times weekly to scalp, leave on for five minutes prior to washing off 3/11/22   Phoebe Lolling, PA-C   triamcinolone (KENALOG) 0.1 % cream Apply to rash twice daily (not face, armpit or groin) 3/11/22   Phoebe Lolling, PA-C   fluocinonide (LIDEX) 0.05 % external solution Apply to scalp daily for rash 3/11/22   Phoebe Lolling, PAAlexisC   B-D 3CC LUER-LENA SYR 45YJ5-6/2 22G X 1-1/2\" 3 ML MISC  3/16/21   Historical Provider, MD   busPIRone (BUSPAR) 10 MG tablet 20 mg daily  20   Historical Provider, MD   vitamin D (ERGOCALCIFEROL) 1.25 MG (46207 UT) CAPS capsule  20   Historical Provider, MD   BD DISP NEEDLE 23G X 1\" [address] [facility]  20   Historical Provider, MD   testosterone cypionate (DEPOTESTOTERONE CYPIONATE) 200 MG/ML injection  20   Historical Provider, MD   loratadine (CLARITIN) 10 MG tablet Claritin TABS   Refills: 0    Active    Historical Provider, MD   levothyroxine (SYNTHROID) 50 MCG tablet Take 50 mcg by mouth Daily    Historical Provider, MD   Ferrous Sulfate (IRON PO) Take by mouth    Historical Provider, MD   emtricitabine-tenofovir alafenamide (DESCOVY) 200-25 MG TABS tablet Take 1 tablet by mouth daily    Historical Provider, MD   atorvastatin (LIPITOR) 10 MG tablet TAKE 1 TABLET BY MOUTH ONE TIME A DAY 11/26/19   Historical Provider, MD   vilazodone HCl (VIIBRYD) 20 MG TABS Take 40 mg by mouth    Historical Provider, MD       Current medications:    No current facility-administered medications for this encounter. Allergies: Allergies   Allergen Reactions    Bupropion      Unknown Reaction.  Adhesive Tape Dermatitis and Rash       Problem List:    Patient Active Problem List   Diagnosis Code    Depression with anxiety F41.8    Excessive daytime sleepiness G47.19    Flat feet, bilateral M21.41, M21.42    Pain in both feet M79.671, M79.672    Hypothyroidism E03.9    Vitamin D deficiency E55.9    On statin therapy Z79.899    Hyperlipidemia E78.5    Other fatigue R53.83    Diarrhea R19.7    Gastroesophageal reflux disease without esophagitis K21.9    Allergic rhinitis J30.9    Dermatitis L30.9    Congenital pes planus Q66.50    Arachnoid cyst G93.0    Female-to-male transgender person Z68.5    Migraines G45.26       Past Medical History:        Diagnosis Date    Concussion with no loss of consciousness 4/8/2022    Depression        Past Surgical History:        Procedure Laterality Date    MASTECTOMY Bilateral 01/20/2021    WISDOM TOOTH EXTRACTION         Social History:    Social History     Tobacco Use    Smoking status: Never Smoker    Smokeless tobacco: Never Used   Substance Use Topics    Alcohol use: Yes     Comment: Social use                                Counseling given: Not Answered      Vital Signs (Current): There were no vitals filed for this visit.                                            BP Readings from Last 3 Encounters:   05/23/22 116/81   05/05/22 127/78   05/03/22 105/68       NPO Status:                                                                                 BMI:   Wt Readings from Last 3 Encounters:   05/23/22 187 lb (84.8 kg)   05/03/22 193 lb (87.5 kg)   05/02/22 190 lb (86.2 kg)     There is no height or weight on file to calculate BMI.    CBC: Lab Results   Component Value Date/Time    WBC 7.66 03/10/2022 12:00 AM    RBC 4.83 03/10/2022 12:00 AM    HGB 15.6 03/10/2022 12:00 AM    HCT 45.9 03/10/2022 12:00 AM    MCV 95.0 03/10/2022 12:00 AM    RDW 43.0 03/10/2022 12:00 AM     03/10/2022 12:00 AM       CMP:   Lab Results   Component Value Date/Time     03/10/2022 12:00 AM    K 4.9 03/10/2022 12:00 AM     03/10/2022 12:00 AM    CO2 28 03/10/2022 12:00 AM    BUN 11 03/10/2022 12:00 AM    CREATININE 1.05 03/10/2022 12:00 AM    GFRAA NOT REPORTED 02/07/2013 04:19 AM    LABGLOM  02/07/2013 04:19 AM     Pediatric GFR requires additional information. Refer to Critical access hospital website for    GLUCOSE 102 02/07/2013 04:19 AM    PROT 6.9 03/10/2022 12:00 AM    CALCIUM 9.1 03/10/2022 12:00 AM    BILITOT 0.5 03/10/2022 12:00 AM    ALKPHOS 77 03/10/2022 12:00 AM    AST 44 03/10/2022 12:00 AM    ALT 68 03/10/2022 12:00 AM       POC Tests: No results for input(s): POCGLU, POCNA, POCK, POCCL, POCBUN, POCHEMO, POCHCT in the last 72 hours.     Coags: No results found for: PROTIME, INR, APTT    HCG (If Applicable): No results found for: PREGTESTUR, PREGSERUM, HCG, HCGQUANT     ABGs: No results found for: PHART, PO2ART, LRY9PLB, DVW6AMB, BEART, U8VEPUGE     Type & Screen (If Applicable):  No results found for: LABABO, LABRH    Drug/Infectious Status (If Applicable):  No results found for: HIV, HEPCAB    COVID-19 Screening (If Applicable):   Lab Results   Component Value Date/Time    COVID19 DETECTED 05/05/2022 06:42 AM    COVID19 Not Detected 05/26/2020 05:32 PM           Anesthesia Evaluation  Patient summary reviewed and Nursing notes reviewed no history of anesthetic complications:   Airway: Mallampati: II  TM distance: >3 FB   Neck ROM: full  Mouth opening: > = 3 FB   Dental: normal exam         Pulmonary:Negative Pulmonary ROS and normal exam  breath sounds clear to auscultation                             Cardiovascular:  Exercise tolerance: no interval change, (+) hyperlipidemia        Rhythm: regular  Rate: normal                    Neuro/Psych:   (+) headaches: migraine headaches, psychiatric history: stable with treatmentdepression/anxiety             GI/Hepatic/Renal:   (+) GERD:,           Endo/Other:    (+) hypothyroidism::., .                 Abdominal:       Abdomen: soft. Vascular: negative vascular ROS. Other Findings:           Anesthesia Plan      MAC     ASA 2             Anesthetic plan and risks discussed with patient. Plan discussed with CRNA.                     Baudilio Delacruz MD   7/6/2022

## 2022-07-06 NOTE — OP NOTE
Operative Note      Patient: Emmett Curling  YOB: 1995  MRN: [id number]    Date of Procedure: 7/6/2022    Pre-Op Diagnosis: Dyspepsia [R10.13]  Acute diarrhea [R19.7]    Post-Op Diagnosis: Mild gastritis, FAIR PREP, small hemorrhoids       Procedure(s):  EGD BIOPSY  COLONOSCOPY DIAGNOSTIC    Surgeon(s):  Lowell Rodriguez MD    Assistant:   * No surgical staff found *    Anesthesia: Monitor Anesthesia Care    Estimated Blood Loss (mL): Minimal    Complications: None    Specimens:   ID Type Source Tests Collected by Time Destination   A : Stomach biopsy rule out H. Pylori Tissue Stomach SURGICAL PATHOLOGY Lowell Rodriguez MD 7/6/2022 0902    B : GE Junction Biopsy Tissue GE Junction Biopsy SURGICAL PATHOLOGY Lowell Rodriguez MD 7/6/2022 7673    C : Random Right Colon Biopsies Tissue Colon SURGICAL PATHOLOGY Lowell Rodriguez MD 7/6/2022 0911    D : Random Left Colon Biopsies Tissue Colon SURGICAL PATHOLOGY Lowell Rodriguez MD 7/6/2022 0912        Implants:  * No implants in log *      Drains: * No LDAs found *              [facility]    EGD    PROCEDURE DATE: 07/06/22    REFERRING PHYSICIAN: No ref. provider found     PRIMARY CARE PROVIDER: IONA Oliveira NP    ATTENDING PHYSICIAN: Lowell Rodriguez MD     HISTORY: Mr. Emmett Curling is a 23 y.o. adult who presents to the  Endoscopy unit for upper endoscopy. The patient's clinical history is remarkable for irregular bowel movements, GERD and dyspepsia. He is currently medically stable and appropriate for the planned procedure. PREOPERATIVE DIAGNOSIS: Dyspepsia and GERD. PROCEDURES:   1) Transoral Upper Endoscopy with cold biopsy. POSTOPERATIVE DIAGNOSIS:     1) Small hypertrophic appearing gastric mucosa at the GEJ, s/p cold biopsy.  No esophagitis, no hiatal hernia  2) Mild non-specific gastritis, no ulcerations, no erosions  3) Normal appearing duodenal mucosa     MEDICATIONS:   MAC per anesthesia     EBL: <10cc    INSTRUMENT: Olympus GIF-H190  flexible Gastroscope. PREPARATION: The nature and character of the procedure as well as risks, benefits, and alternatives were discussed with the patient and informed consent was obtained. Complications were said to include, but were not limited to: medication allergy, medication reaction, cardiovascular and respiratory problems, bleeding, perforation, infection, and/or missed diagnosis. Following arrival in the endoscopy room, the patient was placed in the left lateral decubitus position and final time-out accomplished in the presence of the nursing staff. Baseline vital signs were obtained and reviewed, and IV sedation was subsequently initiated. FINDINGS:   Esophagus: The esophagus was inspected to the Z-line. The endoscopic exam showed small hypertrophic GEJ mucosa. Stomach: The stomach was inspected in both forward and retroflex fashion and was appropriately distensible. The cardia, fundus, incisura, antrum and pylorus were identified via direct visualization. The endoscopic exam showed mild gastritis. Duodenum: The proximal small bowel was inspected through the bulb, sweep, and second portion of the duodenum. The endoscopic exam showed normal appearing mucosa. IMPRESSION:    1) Small hypertrophic appearing gastric mucosa at the GEJ, s/p cold biopsy. No esophagitis, no hiatal hernia  2) Mild non-specific gastritis, no ulcerations, no erosions  3) Normal appearing duodenal mucosa       RECOMMENDATIONS:   1) Follow up path in GI clinic  2) Proceed with colonoscopy       [facility]  Gastroenterology   07/06/22    this note is created with the assistance of a speech recognition program.  While intending to generate a document that actually reflects the content of the visit, the document can still have some errors including those of syntax and sound a like substitutions which may escape proof reading.   It such instances, actual meaning can be extrapolated by contextual diversion. The patient was counseled at length about the risks of karen Covid-19 during their perioperative period and any recovery window from their procedure. The patient was made aware that karen Covid-19  may worsen their prognosis for recovering from their procedure  and lend to a higher morbidity and/or mortality risk. All material risks, benefits, and reasonable alternatives including postponing the procedure were discussed. The patient DOES wish to proceed with the procedure at this time. [facility]     COLONOSCOPY    PROCEDURE DATE: 07/06/22    REFERRING PHYSICIAN: No ref. provider found     PRIMARY CARE PROVIDER: IONA Chapman NP    ATTENDING PHYSICIAN: Brittney Ignacio MD     HISTORY: Mr. Ubaldo Best is a 23 y.o. adult who presents to the  endoscopy unit for colonoscopy. The patient's clinical history is remarkable for history as detailed above. He is currently medically stable and appropriate for the planned procedure. PREOPERATIVE DIAGNOSIS: chronic diarrhea of several months duration. PROCEDURES:   Transanal Colonoscopy with biopsy. POSTPROCEDURE DIAGNOSIS:    FAIR PREP    1) Normal appearing colonic and terminal ileal mucosa. No colitis, no ulcerations, no erosions, no ileitis. 2) Random left and right colon cold biopsies taken to evaluate for microscopic colitis  3) Small external hemorrhoids    MEDICATIONS:     MAC per anesthesia     EBL <10cc      INSTRUMENT: Olympus CF-H190 AL Pediatric flexible Colonoscope. PREPARATION: The nature and character of the procedure as well as risks, benefits, and alternatives were discussed with the patient and informed consent was obtained. Complications were said to include, but were not limited to: medication allergy, medication reaction, cardiovascular and respiratory problems, bleeding, perforation, infection, and/or missed diagnosis.  Following arrival in the endoscopy room, the patient was placed in the left lateral decubitus position and final time-out accomplished in the presence of the nursing staff. Baseline vital signs were obtained and reviewed, and IV sedation was subsequently initiated. FINDINGS: Rectal examination demonstrated no significant visible external abnormality and digital palpation was unremarkable. Following adequate conscious sedation the colonoscope was introduced and advanced under direct visualization to the cecum, which was identified by the ileocecal valve and appendiceal orifice. The bowel preparation was felt to be FAIR. This included moderate amounts of green stool that was mostly able to be adequately irrigated and aspirated. Cecal intubation time was 8 minutes. Once maximally inserted, the endoscope was withdrawn and the mucosa was carefully inspected. The mucosal exam revealed normal appearing mucosa. Retroflexion was performed in the rectum and small hemorrhoids. Withdrawal time was 16 minutes. IMPRESSION:     FAIR PREP    1) Normal appearing colonic and terminal ileal mucosa. No colitis, no ulcerations, no erosions, no ileitis. 2) Random left and right colon cold biopsies taken to evaluate for microscopic colitis  3) Small external hemorrhoids    RECOMMENDATIONS:   1) Follow up with referring provider, as previously scheduled. 2) Repeat Colonoscopy when clinically indicated. Follow up path in GI clinic. 3) Consider functional GI disorders in GI clinic. [facility]  Gastroenterology   07/06/22    This note is created with the assistance of a speech recognition program.  While intending to generate a document that actually reflects the content of the visit, the document can still have some errors including those of syntax and sound a like substitutions which may escape proof reading. It such instances, actual meaning can be extrapolated by contextual diversion.     The patient was counseled at length about the risks of karen Covid-19 during their perioperative period and any recovery window from their procedure. The patient was made aware that karen Covid-19  may worsen their prognosis for recovering from their procedure  and lend to a higher morbidity and/or mortality risk. All material risks, benefits, and reasonable alternatives including postponing the procedure were discussed. The patient DOES wish to proceed with the procedure at this time.       Electronically signed by Polly Brooks MD on 7/6/2022 at 9:21 AM

## 2022-07-06 NOTE — H&P
Procedure History and Physical    Pre-Procedural Diagnosis:  GERD, irregular bowel movements, evaluate for IBD    Indications:  same    Procedure Planned: endoscopy and colonoscopy     History Obtained From:  patient    HISTORY OF PRESENT ILLNESS:       The patient is a 23 y.o. adult who presents for the above procedure. Past Medical History:    Past Medical History:   Diagnosis Date    Concussion with no loss of consciousness 4/8/2022    Depression        Past Surgical History:    Past Surgical History:   Procedure Laterality Date    MASTECTOMY Bilateral 01/20/2021    WISDOM TOOTH EXTRACTION         Medications:  Current Facility-Administered Medications   Medication Dose Route Frequency Provider Last Rate Last Admin    lidocaine PF 1 % injection 1 mL  1 mL IntraDERmal Once PRN Emily Sanchez MD        lactated ringers infusion   IntraVENous Continuous Emily Sanchez  mL/hr at 07/06/22 0819 New Bag at 07/06/22 0819    sodium chloride flush 0.9 % injection 5-40 mL  5-40 mL IntraVENous 2 times per day Emily Sanchez MD        sodium chloride flush 0.9 % injection 5-40 mL  5-40 mL IntraVENous PRN Emily Sanchez MD        0.9 % sodium chloride infusion   IntraVENous PRN Emily Sanchez MD           Allergies: Allergies   Allergen Reactions    Bupropion      Unknown Reaction.     Adhesive Tape Dermatitis and Rash                 Social   Social History     Tobacco Use    Smoking status: Never Smoker    Smokeless tobacco: Never Used   Substance Use Topics    Alcohol use: Yes     Comment: Social use        PSYCH HISTORY:  Depression No  Anxiety No  Suicide No       Family History   Problem Relation Age of Onset    Cancer Mother 48        Cervical    Alcohol Abuse Mother     Substance Abuse Mother     Alcohol Abuse Father     Substance Abuse Father       No family history of colon cancer, Crohn's disease, or ulcerative colitis    Problems with Sedation/Anesthesia in the past? no    REVIEW OF SYSTEMS:  12 point review of systems negative other than mentioned above. PHYSICAL EXAM:    Vitals:  /78   Pulse 73   Temp 97.9 °F (36.6 °C) (Infrared)   Resp 19   Ht 5' 2\" (1.575 m)   Wt 188 lb (85.3 kg)   SpO2 96%   BMI 34.39 kg/m²     Focused Exam related to procedure:    General appearance: NAD, conversant   Eyes: anicteric sclerae, moist conjunctivae; no lid-lag; PERRLA   Lungs: CTA, with normal respiratory effort and no intercostal retractions   CV: RRR, no MRGs   Abdomen: Soft, non-tender; no masses or HSM   Skin: Normal temperature, turgor and texture; no rash, ulcers or subcutaneous nodules     DATA:  CBC:   Lab Results   Component Value Date    WBC 7.66 03/10/2022    HGB 15.6 03/10/2022    HCT 45.9 03/10/2022    MCV 95.0 03/10/2022     03/10/2022     BUN/Cr:   Lab Results   Component Value Date    BUN 11 03/10/2022   ,   Lab Results   Component Value Date    CREATININE 1.05 03/10/2022     Potassium:   Lab Results   Component Value Date    K 4.9 03/10/2022     PT/INR: No results found for: INR, PROTIME    ASSESSMENT AND PLAN:       1. Patient is a 23 y.o. adult with above specified procedure planned. Expected Sedation/Anesthesia Type: MAC    2. ASA ([address] Anesthesiology) Anesthesia Status: Class 2 - A normal healthy patient with mild systemic disease    3. Mallampati: II (soft palate, uvula, fauces visible)  4. Procedure options, risks and benefits reviewed with Patient. Patient expresses understanding.     5.  Consent has been signed:  Yes    Billie Rodriguez MD

## 2022-07-08 LAB — SURGICAL PATHOLOGY REPORT: NORMAL

## 2022-07-21 ENCOUNTER — OFFICE VISIT (OUTPATIENT)
Dept: NEUROLOGY | Age: 27
End: 2022-07-21
Payer: COMMERCIAL

## 2022-07-21 VITALS
DIASTOLIC BLOOD PRESSURE: 71 MMHG | WEIGHT: 180 LBS | SYSTOLIC BLOOD PRESSURE: 112 MMHG | BODY MASS INDEX: 33.13 KG/M2 | HEART RATE: 74 BPM | HEIGHT: 62 IN

## 2022-07-21 DIAGNOSIS — G93.0 ARACHNOID CYST: ICD-10-CM

## 2022-07-21 DIAGNOSIS — R41.89 COGNITIVE IMPAIRMENT: Primary | ICD-10-CM

## 2022-07-21 PROCEDURE — 99204 OFFICE O/P NEW MOD 45 MIN: CPT | Performed by: STUDENT IN AN ORGANIZED HEALTH CARE EDUCATION/TRAINING PROGRAM

## 2022-07-21 PROCEDURE — G8427 DOCREV CUR MEDS BY ELIG CLIN: HCPCS | Performed by: STUDENT IN AN ORGANIZED HEALTH CARE EDUCATION/TRAINING PROGRAM

## 2022-07-21 PROCEDURE — G8417 CALC BMI ABV UP PARAM F/U: HCPCS | Performed by: STUDENT IN AN ORGANIZED HEALTH CARE EDUCATION/TRAINING PROGRAM

## 2022-07-21 PROCEDURE — 1036F TOBACCO NON-USER: CPT | Performed by: STUDENT IN AN ORGANIZED HEALTH CARE EDUCATION/TRAINING PROGRAM

## 2022-07-21 ASSESSMENT — ENCOUNTER SYMPTOMS
RESPIRATORY NEGATIVE: 1
GASTROINTESTINAL NEGATIVE: 1
EYES NEGATIVE: 1

## 2022-07-21 NOTE — PROGRESS NOTES
[facility] NEUROLOGY SPECIALIST  [address]  Dept: [phone]    PATIENT NAME: Romel Jason  PATIENT MRN: [id number]  PRIMARY CARE PHYSICIAN: IONA San NP    HPI:      Romel Jason is a 23 y.o. adult who presents to clinic today for evaluation of Neurologic Problem (Concussion)     He had a fall on April 2, 2022 while skating. He hit his head but did not have any loss of consciousness. He had short term memory loss, confusion, dizziness and nausea which improved after one week. Currently does not have any symptoms postconcussion. Is mainly here to discuss the CT head that was done which showed mild cortical atrophy which is prominent for his age as well as a possible arachnoid cyst.    He notes he had chronic issues with memory with long term and short term memory. He is able to do all his ADLs. 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 He is not driving by choice right now. He notes he does have difficulty focusing. He does have a history of anxiety and depression which is he on medications for. He notes his attention worsens if he is more tired or more anxious. CT head 4/2/22  No acute intracranial abnormality. Midline posterior fossa extra-axial fluid collection appearance favoring arachnoid cyst.  Minimal-to-mild cortical atrophy, prominent for age.       TSH and B12 were normal in 3/10/22  MOCA in clinic today 27/30  PREVIOUS WORKUP:     Past Medical History:   Diagnosis Date    Concussion with no loss of consciousness 4/8/2022    Depression         Past Surgical History:   Procedure Laterality Date    COLONOSCOPY  07/06/2022    COLONOSCOPY DIAGNOSTIC     COLONOSCOPY N/A 7/6/2022    COLONOSCOPY WITH BIOPSY performed by Marcy Rich MD at [address] [place] [address].    ESOPHAGOGASTRODUODENOSCOPY  07/06/2022     EGD BIOPSY     MASTECTOMY Bilateral 01/20/2021    UPPER GASTROINTESTINAL ENDOSCOPY N/A 7/6/2022    EGD BIOPSY performed by Maryc Rich MD at [address] [place] [address] EXTRACTION          Social History     Socioeconomic History    Marital status: Single     Spouse name: Not on file    Number of children: Not on file    Years of education: Not on file    Highest education level: Not on file   Occupational History    Not on file   Tobacco Use    Smoking status: Never    Smokeless tobacco: Never   Vaping Use    Vaping Use: Never used   Substance and Sexual Activity    Alcohol use: Yes     Comment: Social use    Drug use: Yes     Types: Marijuana (Craigelyn Boeck)     Comment: Social, maybe once a month    Sexual activity: Not on file   Other Topics Concern    Not on file   Social History Narrative    Not on file     Social Determinants of Health     Financial Resource Strain: Unknown    Difficulty of Paying Living Expenses: Patient refused   Food Insecurity: Unknown    Worried About Running Out of Food in the Last Year: Patient refused    Ran Out of Food in the Last Year: Patient refused   Transportation Needs: Not on file   Physical Activity: Not on file   Stress: Not on file   Social Connections: Not on file   Intimate Partner Violence: Not on file   Housing Stability: Not on file        Current Outpatient Medications   Medication Sig Dispense Refill    fluticasone (FLONASE) 50 MCG/ACT nasal spray 2 sprays by Each Nostril route daily 48 g 1    cimetidine (TAGAMET) 800 MG tablet Take 1 tablet by mouth 2 times daily 90 tablet 3    ketoconazole (NIZORAL) 2 % shampoo Apply 3-4 times weekly to scalp, leave on for five minutes prior to washing off 120 mL 2    triamcinolone (KENALOG) 0.1 % cream Apply to rash twice daily (not face, armpit or groin) 80 g 1    fluocinonide (LIDEX) 0.05 % external solution Apply to scalp daily for rash 60 mL 2    B-D 3CC LUER-LENA SYR 57OR9-6/2 22G X 1-1/2\" 3 ML MISC       busPIRone (BUSPAR) 10 MG tablet Patient takes 2 tablets at one time to equal 20 mg      BD DISP NEEDLE 23G X 1\" MISC       testosterone cypionate (DEPOTESTOTERONE CYPIONATE) 200 MG/ML injection       loratadine (CLARITIN) 10 MG tablet Claritin TABS   Refills: 0    Active      levothyroxine (SYNTHROID) 50 MCG tablet Take 50 mcg by mouth Daily      Ferrous Sulfate (IRON PO) Take by mouth      emtricitabine-tenofovir alafenamide (DESCOVY) 200-25 MG TABS tablet Take 1 tablet by mouth daily      atorvastatin (LIPITOR) 10 MG tablet TAKE 1 TABLET BY MOUTH ONE TIME A DAY  3    vilazodone HCl (VIIBRYD) 20 MG TABS Take 40 mg by mouth      vitamin D (ERGOCALCIFEROL) 1.25 MG (24486 UT) CAPS capsule        No current facility-administered medications for this visit. Allergies   Allergen Reactions    Bupropion      Unknown Reaction. Adhesive Tape Dermatitis and Rash        REVIEW OF SYSTEMS:     Review of Systems   Constitutional: Negative. HENT: Negative. Eyes: Negative. Respiratory: Negative. Cardiovascular: Negative. Gastrointestinal: Negative. Endocrine: Negative. Genitourinary: Negative. Musculoskeletal: Negative. Skin: Negative. Neurological:  Negative for dizziness, tremors, seizures, syncope, facial asymmetry, speech difficulty, weakness, light-headedness, numbness and headaches. Hematological: Negative. Psychiatric/Behavioral:  Positive for decreased concentration, dysphoric mood and sleep disturbance. The patient is nervous/anxious.          NEUROLOGICAL EXAMINATION:   VITALS  /71 (Site: Left Upper Arm, Position: Sitting, Cuff Size: Medium Adult)   Pulse 74   Ht 5' 2\" (1.575 m)   Wt 180 lb (81.6 kg)   BMI 32.92 kg/m²      Mental status    Alert and oriented x 3; intact memory with no confusion, speech or language problems; no hallucinations or delusions     Cranial nerves    II - visual fields intact to confrontation  III, IV, VI - extra-ocular muscles full: no pupillary defect; no EUNICE, no nystagmus, no ptosis   V - normal facial sensation                                                               VII - normal facial symmetry                                                             VIII - intact hearing                                                                             IX, X - symmetrical palate                                                                  XI - symmetrical shoulder shrug                                                       XII - tongue midline without atrophy or fasciculation      Motor function  Normal muscle bulk and tone; strength 5/5 on all 4 extremities, no pronator drift      Sensory function Intact to light touch, pinprick, vibration, proprioception on all 4 extremities      Cerebellar Intact fine motor movement. No involuntary movements or tremors. No ataxia or dysmetria on finger to nose or heel to shin testing      Reflex function DTR 2+ on bilateral UE and LE, symmetric. Gait                   normal base and arm swing        ASSESSMENT / PLAN:   Tello Carbajal is a 23 y.o. adult that established care with neurology for postconcussion and discussion of CT head that was done after his fall in April 2022. The CT head shows mild cortical atrophy and a possible arachnoid cyst per report. Will order an MRI brain for further detailed evaluation. Patient notes that he has been having memory issues for multiple years but it looks like it is worsened by anxiety and fatigue. Did have a sleep study in 2016 but was told it was normal as well as an MSLT. Will request prior records. CT head done at that time for evaluation of fatigue so we will try to obtain those results as they are not available in the EMR. TSH and B12 are normal.Patient scored a normal score on MoCA testing today 27 out of 30. Discussed that he needs to address anxiety, depression and fatigue which are likely contributing to his memory issues.           Joaquin Mooney MD   Neurology & Sleep Medicine  [facility]

## 2022-07-29 ENCOUNTER — HOSPITAL ENCOUNTER (OUTPATIENT)
Dept: NON INVASIVE DIAGNOSTICS | Age: 27
Discharge: HOME | End: 2022-07-29
Payer: COMMERCIAL

## 2022-07-29 ENCOUNTER — HOSPITAL ENCOUNTER (OUTPATIENT)
Age: 27
Discharge: HOME | End: 2022-07-29
Payer: COMMERCIAL

## 2022-07-29 LAB
LV EF: 55 %
LVEF MODALITY: NORMAL

## 2022-07-29 PROCEDURE — 93306 TTE W/DOPPLER COMPLETE: CPT

## 2022-07-29 PROCEDURE — 93005 ELECTROCARDIOGRAM TRACING: CPT | Performed by: INTERNAL MEDICINE

## 2022-07-30 LAB
EKG ATRIAL RATE: 69 BPM
EKG P AXIS: 64 DEGREES
EKG P-R INTERVAL: 152 MS
EKG Q-T INTERVAL: 356 MS
EKG QRS DURATION: 90 MS
EKG QTC CALCULATION (BAZETT): 381 MS
EKG R AXIS: 74 DEGREES
EKG T AXIS: 68 DEGREES
EKG VENTRICULAR RATE: 69 BPM

## 2022-07-30 PROCEDURE — 93010 ELECTROCARDIOGRAM REPORT: CPT | Performed by: INTERNAL MEDICINE

## 2022-08-01 ENCOUNTER — OFFICE VISIT (OUTPATIENT)
Dept: OBGYN CLINIC | Age: 27
End: 2022-08-01

## 2022-08-01 ENCOUNTER — HOSPITAL ENCOUNTER (OUTPATIENT)
Age: 27
Setting detail: SPECIMEN
Discharge: HOME | End: 2022-08-01

## 2022-08-01 VITALS
DIASTOLIC BLOOD PRESSURE: 83 MMHG | SYSTOLIC BLOOD PRESSURE: 127 MMHG | HEART RATE: 69 BPM | WEIGHT: 191 LBS | HEIGHT: 62 IN | BODY MASS INDEX: 35.15 KG/M2

## 2022-08-01 DIAGNOSIS — R87.615 ENCOUNTER FOR REPEAT PAP SMEAR DUE TO PREVIOUS INSUFFICIENT CERVICAL CELLS: Primary | ICD-10-CM

## 2022-08-08 ENCOUNTER — HOSPITAL ENCOUNTER (OUTPATIENT)
Dept: MRI IMAGING | Age: 27
Discharge: HOME | End: 2022-08-10
Payer: COMMERCIAL

## 2022-08-08 DIAGNOSIS — G93.0 ARACHNOID CYST: ICD-10-CM

## 2022-08-08 DIAGNOSIS — R41.89 COGNITIVE IMPAIRMENT: ICD-10-CM

## 2022-08-08 PROCEDURE — 6360000004 HC RX CONTRAST MEDICATION: Performed by: STUDENT IN AN ORGANIZED HEALTH CARE EDUCATION/TRAINING PROGRAM

## 2022-08-08 PROCEDURE — 70553 MRI BRAIN STEM W/O & W/DYE: CPT

## 2022-08-08 PROCEDURE — A9579 GAD-BASE MR CONTRAST NOS,1ML: HCPCS | Performed by: STUDENT IN AN ORGANIZED HEALTH CARE EDUCATION/TRAINING PROGRAM

## 2022-08-08 RX ADMIN — GADOTERIDOL 15 ML: 279.3 INJECTION, SOLUTION INTRAVENOUS at 17:34

## 2022-08-10 LAB — CYTOLOGY REPORT: NORMAL

## 2022-08-17 DIAGNOSIS — L21.9 SEBORRHEIC DERMATITIS: ICD-10-CM

## 2022-08-17 RX ORDER — KETOCONAZOLE 20 MG/ML
SHAMPOO TOPICAL
Qty: 120 ML | Refills: 2 | Status: SHIPPED | OUTPATIENT
Start: 2022-08-17

## 2022-08-18 ENCOUNTER — TELEPHONE (OUTPATIENT)
Dept: NEUROLOGY | Age: 27
End: 2022-08-18

## 2022-08-18 NOTE — TELEPHONE ENCOUNTER
Hazel Molina called in asking if after you reviewed the sleep study done at [facility] if he should get another one to clarify diagnosis. If so he'd like to go prior to his upcoming visit. Please advise.

## 2022-08-25 ENCOUNTER — OFFICE VISIT (OUTPATIENT)
Dept: DERMATOLOGY | Age: 27
End: 2022-08-25
Payer: COMMERCIAL

## 2022-08-25 VITALS
WEIGHT: 191.8 LBS | TEMPERATURE: 97.7 F | OXYGEN SATURATION: 97 % | HEIGHT: 62 IN | HEART RATE: 68 BPM | DIASTOLIC BLOOD PRESSURE: 80 MMHG | SYSTOLIC BLOOD PRESSURE: 113 MMHG | BODY MASS INDEX: 35.3 KG/M2

## 2022-08-25 DIAGNOSIS — L21.9 SEBORRHEIC DERMATITIS: Primary | ICD-10-CM

## 2022-08-25 DIAGNOSIS — L40.9 PSORIASIS: ICD-10-CM

## 2022-08-25 DIAGNOSIS — B07.9 VERRUCA VULGARIS: ICD-10-CM

## 2022-08-25 PROCEDURE — G8427 DOCREV CUR MEDS BY ELIG CLIN: HCPCS | Performed by: PHYSICIAN ASSISTANT

## 2022-08-25 PROCEDURE — G8417 CALC BMI ABV UP PARAM F/U: HCPCS | Performed by: PHYSICIAN ASSISTANT

## 2022-08-25 PROCEDURE — 1036F TOBACCO NON-USER: CPT | Performed by: PHYSICIAN ASSISTANT

## 2022-08-25 PROCEDURE — 99213 OFFICE O/P EST LOW 20 MIN: CPT | Performed by: PHYSICIAN ASSISTANT

## 2022-08-25 PROCEDURE — 17110 DESTRUCTION B9 LES UP TO 14: CPT | Performed by: PHYSICIAN ASSISTANT

## 2022-08-25 NOTE — PROGRESS NOTES
Dermatology Patient Note  Terrie  [address]  [address] [facility] [address]  Dept: [phone]  Dept Fax: [phone]      VISITDATE: 8/25/2022   REFERRING PROVIDER: No ref. provider found      Isabel Oconnor is a 33 y.o. adult  who presents today in the office for:    Follow-up and Psoriasis (Pt states psoriasis is clearing up. Pt states he has a wart on the rt foot and a spot on his lt big toe he would like looked at )      HISTORY OF PRESENT ILLNESS:  As above. Psoriasis is well controlled with prn TMC 0.1% cream BID prn and seborrheic dermatitis is well controlled with prn ketoconazole shampoo and lidex solution. Pt states that he has a wart on right plantar aspect of foot, 1st web space.     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 person 05/23/2022     Priority: Medium    Arachnoid cyst 04/08/2022    Hyperlipidemia 03/13/2022    Other fatigue 03/13/2022    Acute diarrhea 03/13/2022    Gastroesophageal reflux disease without esophagitis 03/13/2022    Allergic rhinitis 03/13/2022    Dermatitis 03/13/2022    Congenital pes planus 03/13/2022    Hypothyroidism 03/08/2022    Vitamin D deficiency 03/08/2022    On statin therapy 03/08/2022    Pain in both feet 08/10/2016    Excessive daytime sleepiness 06/21/2016    Flat feet, bilateral 06/21/2016    Depression with anxiety 10/28/2014       CURRENT MEDICATIONS:   Current Outpatient Medications   Medication Sig Dispense Refill    ketoconazole (NIZORAL) 2 % shampoo Apply 3-4 times weekly to scalp, leave on for five minutes prior to washing off 120 mL 2    VITAMIN D PO Take 1.5 mg by mouth Twice a Week      fluticasone (FLONASE) 50 MCG/ACT nasal spray 2 sprays by Each Nostril route daily 48 g 1    triamcinolone (KENALOG) 0.1 % cream Apply to rash twice daily (not face, armpit or groin) 80 g 1    fluocinonide (LIDEX) 0.05 % external solution Apply to scalp daily for rash 60 mL 2    B-D 3CC LUER-LENA SYR 03HH4-4/2 22G X 1-1/2\" 3 ML MISC       busPIRone (BUSPAR) 10 MG tablet Patient takes 2 tablets at one time to equal 20 mg      vitamin D (ERGOCALCIFEROL) 1.25 MG (15001 UT) CAPS capsule       BD DISP NEEDLE 23G X 1\" MISC       testosterone cypionate (DEPOTESTOTERONE CYPIONATE) 200 MG/ML injection       loratadine (CLARITIN) 10 MG tablet Claritin TABS   Refills: 0    Active      levothyroxine (SYNTHROID) 50 MCG tablet Take 50 mcg by mouth Daily      Ferrous Sulfate (IRON PO) Take by mouth      emtricitabine-tenofovir alafenamide (DESCOVY) 200-25 MG TABS tablet Take 1 tablet by mouth daily      atorvastatin (LIPITOR) 10 MG tablet TAKE 1 TABLET BY MOUTH ONE TIME A DAY  3    vilazodone HCl (VIIBRYD) 20 MG TABS Take 20 mg by mouth in the morning. cimetidine (TAGAMET) 800 MG tablet Take 1 tablet by mouth 2 times daily 90 tablet 3     No current facility-administered medications for this visit. ALLERGIES:   Allergies   Allergen Reactions    Bupropion      Unknown Reaction. Adhesive Tape Dermatitis and Rash       SOCIAL HISTORY:  Social History     Tobacco Use    Smoking status: Never    Smokeless tobacco: Never   Substance Use Topics    Alcohol use: Yes     Comment: Social use       Pertinent ROS:  Review of Systems  Skin: Denies any new changing, growing or bleeding lesions or rashes except as described in the HPI   Constitutional: Denies fevers, chills, and malaise.     PHYSICAL EXAM:   /80   Pulse 68   Temp 97.7 °F (36.5 °C) (Temporal)   Ht 5' 2\" (1.575 m)   Wt 191 lb 12.8 oz (87 kg)   SpO2 97%   BMI 35.08 kg/m²     The patient is generally well appearing, well nourished, alert and conversational. Affect is normal.    Cutaneous Exam:  Physical Exam  Focused exam of face, neck, scalp, and feet was performed    Facial covering was not removed during examination. Diagnoses/exam findings/medical history pertinent to this visit are listed below:    Assessment:   Diagnosis Orders   1. Seborrheic dermatitis        2. Psoriasis        3. Verruca vulgaris  SD DESTRUCTION BENIGN LESIONS UP TO 14           Plan:  1. Seborrheic dermatitis  - stable chronic illness   - Continue fluocinonide soln and ketoconazole shampoo    2. Psoriasis  - stable chronic illness   - Continue TMC 0.1% cream     3. Verruca vulgaris  After discussion of the risks, benefits, and alternative therapies available, the patient elected to proceed. After obtaining written informed consent, the patient's identity, procedure, and site were verified during a time out prior to proceeding procedure. The lesions on the right plantar foot were treated using liquid nitrogen spray gun for 6 second per cycle, 2 cycles total. The patient tolerated the procedure well and there were no immediate complications.   - SD DESTRUCTION BENIGN LESIONS UP TO 14      RTC 1Y    Future Appointments   Date Time Provider Garrett Mooreisti   10/6/2022  2:30 PM Colin Cutler MD [place] [id number]   11/16/2022  2:30 PM Emmanuel Harding MD [facility]   11/23/2022  3:40 PM Candice Lloyd MD Neuro [email or web address]   8/25/2023  3:00 PM Jenny Perez PA-C  derm [email or web address]         There are no Patient Instructions on file for this visit.       Electronically signed by Jenny Perez PA-C on 8/25/22 at 2:06 PM EDT

## 2022-09-27 ENCOUNTER — HOSPITAL ENCOUNTER (OUTPATIENT)
Age: 27
Setting detail: SPECIMEN
Discharge: HOME | End: 2022-09-27

## 2022-09-27 ENCOUNTER — PATIENT MESSAGE (OUTPATIENT)
Dept: PRIMARY CARE CLINIC | Age: 27
End: 2022-09-27

## 2022-09-27 ENCOUNTER — OFFICE VISIT (OUTPATIENT)
Dept: PRIMARY CARE CLINIC | Age: 27
End: 2022-09-27
Payer: COMMERCIAL

## 2022-09-27 VITALS
BODY MASS INDEX: 34.96 KG/M2 | OXYGEN SATURATION: 96 % | DIASTOLIC BLOOD PRESSURE: 77 MMHG | HEART RATE: 77 BPM | TEMPERATURE: 98.1 F | SYSTOLIC BLOOD PRESSURE: 113 MMHG | HEIGHT: 62 IN | WEIGHT: 190 LBS

## 2022-09-27 DIAGNOSIS — J02.9 SORE THROAT: ICD-10-CM

## 2022-09-27 DIAGNOSIS — Z20.822 CONTACT WITH AND (SUSPECTED) EXPOSURE TO COVID-19: Primary | ICD-10-CM

## 2022-09-27 DIAGNOSIS — R19.7 DIARRHEA, UNSPECIFIED TYPE: ICD-10-CM

## 2022-09-27 DIAGNOSIS — R06.02 SHORTNESS OF BREATH: ICD-10-CM

## 2022-09-27 LAB — S PYO AG THROAT QL: NORMAL

## 2022-09-27 PROCEDURE — 87880 STREP A ASSAY W/OPTIC: CPT

## 2022-09-27 PROCEDURE — 1036F TOBACCO NON-USER: CPT

## 2022-09-27 PROCEDURE — G8427 DOCREV CUR MEDS BY ELIG CLIN: HCPCS

## 2022-09-27 PROCEDURE — 99213 OFFICE O/P EST LOW 20 MIN: CPT

## 2022-09-27 PROCEDURE — G8417 CALC BMI ABV UP PARAM F/U: HCPCS

## 2022-09-27 RX ORDER — ALBUTEROL SULFATE 90 UG/1
2 AEROSOL, METERED RESPIRATORY (INHALATION) 4 TIMES DAILY PRN
Qty: 18 G | Refills: 0 | Status: SHIPPED | OUTPATIENT
Start: 2022-09-27

## 2022-09-27 RX ORDER — PREDNISONE 20 MG/1
40 TABLET ORAL DAILY
Qty: 10 TABLET | Refills: 0 | Status: SHIPPED | OUTPATIENT
Start: 2022-09-27 | End: 2022-10-02

## 2022-09-27 SDOH — ECONOMIC STABILITY: FOOD INSECURITY: WITHIN THE PAST 12 MONTHS, YOU WORRIED THAT YOUR FOOD WOULD RUN OUT BEFORE YOU GOT MONEY TO BUY MORE.: NEVER TRUE

## 2022-09-27 SDOH — ECONOMIC STABILITY: FOOD INSECURITY: WITHIN THE PAST 12 MONTHS, THE FOOD YOU BOUGHT JUST DIDN'T LAST AND YOU DIDN'T HAVE MONEY TO GET MORE.: NEVER TRUE

## 2022-09-27 ASSESSMENT — ENCOUNTER SYMPTOMS
BLOOD IN STOOL: 0
NAUSEA: 0
RECTAL PAIN: 0
WHEEZING: 0
STRIDOR: 0
SINUS PRESSURE: 0
COUGH: 0
EYE DISCHARGE: 0
ABDOMINAL PAIN: 0
RHINORRHEA: 0
EYE ITCHING: 0
CHEST TIGHTNESS: 0
ABDOMINAL DISTENTION: 0
PHOTOPHOBIA: 0
TROUBLE SWALLOWING: 0
ANAL BLEEDING: 0
SWOLLEN GLANDS: 0
CONSTIPATION: 0
VISUAL CHANGE: 0
APNEA: 0
SINUS PAIN: 0
EYE REDNESS: 0
FACIAL SWELLING: 0
SORE THROAT: 1
BACK PAIN: 0
GASTROINTESTINAL NEGATIVE: 1
COLOR CHANGE: 0
CHANGE IN BOWEL HABIT: 0
SHORTNESS OF BREATH: 1
CHOKING: 0
VOMITING: 0
DIARRHEA: 0
EYES NEGATIVE: 1
EYE PAIN: 0
VOICE CHANGE: 0

## 2022-09-27 ASSESSMENT — SOCIAL DETERMINANTS OF HEALTH (SDOH): HOW HARD IS IT FOR YOU TO PAY FOR THE VERY BASICS LIKE FOOD, HOUSING, MEDICAL CARE, AND HEATING?: NOT HARD AT ALL

## 2022-09-27 NOTE — PROGRESS NOTES
[address] [place] [facility] [id number] [place] [address] WALK IN [facility]  [address] [place] [address]  Dept: [phone]    Sixto Cross is a 33 y.o. adult Established patient, who presents to the walk-in clinic today with conditions/complaints as noted below:    Chief Complaint   Patient presents with    Pharyngitis     Started monday         HPI:     Pharyngitis  This is a new problem. The current episode started yesterday. The problem occurs constantly. The problem has been gradually worsening. Associated symptoms include a fever (felt feverish but not measured) and a sore throat. Pertinent negatives include no abdominal pain, anorexia, arthralgias, change in bowel habit, chest pain, chills, congestion, coughing, diaphoresis, fatigue, headaches, joint swelling, myalgias, nausea, neck pain, numbness, rash, swollen glands, urinary symptoms, vertigo, visual change, vomiting or weakness. The symptoms are aggravated by swallowing. He has tried nothing for the symptoms.      Past Medical History:   Diagnosis Date    Concussion with no loss of consciousness 4/8/2022    Depression        Current Outpatient Medications   Medication Sig Dispense Refill    predniSONE (DELTASONE) 20 MG tablet Take 2 tablets by mouth daily for 5 days 10 tablet 0    albuterol sulfate HFA (VENTOLIN HFA) 108 (90 Base) MCG/ACT inhaler Inhale 2 puffs into the lungs 4 times daily as needed for Wheezing 18 g 0    ketoconazole (NIZORAL) 2 % shampoo Apply 3-4 times weekly to scalp, leave on for five minutes prior to washing off 120 mL 2    fluticasone (FLONASE) 50 MCG/ACT nasal spray 2 sprays by Each Nostril route daily 48 g 1    cimetidine (TAGAMET) 800 MG tablet Take 1 tablet by mouth 2 times daily 90 tablet 3    triamcinolone (KENALOG) 0.1 % cream Apply to rash twice daily (not face, armpit or groin) 80 g 1    fluocinonide (LIDEX) 0.05 % external solution Apply to scalp daily for rash 60 mL 2    busPIRone (BUSPAR) 10 MG tablet Patient takes 2 tablets at one time to equal 20 mg      vitamin D (ERGOCALCIFEROL) 1.25 MG (14140 UT) CAPS capsule       testosterone cypionate (DEPOTESTOTERONE CYPIONATE) 200 MG/ML injection       loratadine (CLARITIN) 10 MG tablet Claritin TABS   Refills: 0    Active      levothyroxine (SYNTHROID) 50 MCG tablet Take 50 mcg by mouth Daily      Ferrous Sulfate (IRON PO) Take by mouth      emtricitabine-tenofovir alafenamide (DESCOVY) 200-25 MG TABS tablet Take 1 tablet by mouth daily      atorvastatin (LIPITOR) 10 MG tablet TAKE 1 TABLET BY MOUTH ONE TIME A DAY  3    vilazodone HCl (VIIBRYD) 20 MG TABS Take 20 mg by mouth in the morning. VITAMIN D PO Take 1.5 mg by mouth Twice a Week (Patient not taking: Reported on 9/27/2022)      B-D 3CC LUER-LENA SYR 45CD3-1/2 22G X 1-1/2\" 3 ML MISC       BD DISP NEEDLE 23G X 1\" MISC        No current facility-administered medications for this visit. Allergies   Allergen Reactions    Bupropion      Unknown Reaction. Adhesive Tape Dermatitis and Rash       Review of Systems:     Review of Systems   Constitutional:  Positive for fever (felt feverish but not measured). Negative for activity change, appetite change, chills, diaphoresis, fatigue and unexpected weight change. HENT:  Positive for sore throat. Negative for congestion, dental problem, drooling, ear discharge, ear pain, facial swelling, hearing loss, mouth sores, nosebleeds, postnasal drip, rhinorrhea, sinus pressure, sinus pain, sneezing, tinnitus, trouble swallowing and voice change. Eyes: Negative. Negative for photophobia, pain, discharge, redness, itching and visual disturbance. Respiratory:  Positive for shortness of breath. Negative for apnea, cough, choking, chest tightness, wheezing and stridor. Cardiovascular: Negative.   Negative for chest pain, palpitations and leg swelling. Gastrointestinal: Negative. Negative for abdominal distention, abdominal pain, anal bleeding, anorexia, blood in stool, change in bowel habit, constipation, diarrhea, nausea, rectal pain and vomiting. Musculoskeletal: Negative. Negative for arthralgias, back pain, gait problem, joint swelling, myalgias, neck pain and neck stiffness. Skin: Negative. Negative for color change, pallor, rash and wound. Neurological:  Positive for dizziness. Negative for vertigo, tremors, seizures, syncope, facial asymmetry, speech difficulty, weakness, light-headedness, numbness and headaches. Physical Exam:      /77   Pulse 77   Temp 98.1 °F (36.7 °C)   Ht 5' 2\" (1.575 m)   Wt 190 lb (86.2 kg)   SpO2 96%   BMI 34.75 kg/m²     Physical Exam  Vitals reviewed. Constitutional:       Appearance: Normal appearance. HENT:      Head: Normocephalic and atraumatic. Right Ear: Tympanic membrane, ear canal and external ear normal.      Left Ear: Tympanic membrane, ear canal and external ear normal.      Nose: Congestion and rhinorrhea present. Mouth/Throat:      Lips: Pink. Mouth: Mucous membranes are moist.      Pharynx: Uvula midline. Oropharyngeal exudate (post nasal drainage) and posterior oropharyngeal erythema present. No pharyngeal swelling or uvula swelling. Tonsils: No tonsillar exudate or tonsillar abscesses. 3+ on the right. 3+ on the left. Eyes:      Extraocular Movements: Extraocular movements intact. Conjunctiva/sclera: Conjunctivae normal.      Pupils: Pupils are equal, round, and reactive to light. Cardiovascular:      Rate and Rhythm: Normal rate and regular rhythm. Pulses: Normal pulses. Heart sounds: Normal heart sounds. Pulmonary:      Effort: Pulmonary effort is normal.      Breath sounds: Normal air entry. Examination of the right-upper field reveals decreased breath sounds.  Examination of the left-upper field reveals decreased breath sounds. Examination of the right-lower field reveals wheezing. Examination of the left-lower field reveals wheezing. Decreased breath sounds and wheezing present. Abdominal:      General: Abdomen is flat. Bowel sounds are normal.      Palpations: Abdomen is soft. Musculoskeletal:         General: Normal range of motion. Cervical back: Normal range of motion and neck supple. Skin:     General: Skin is warm and dry. Capillary Refill: Capillary refill takes less than 2 seconds. Neurological:      General: No focal deficit present. Mental Status: He is alert and oriented to person, place, and time. Mental status is at baseline. Psychiatric:         Mood and Affect: Mood normal.         Behavior: Behavior normal.       Plan:          1. Contact with and (suspected) exposure to covid-19  -     COVID-19; Future  2. Sore throat  -     POCT rapid strep A  3. Shortness of breath  -     predniSONE (DELTASONE) 20 MG tablet; Take 2 tablets by mouth daily for 5 days, Disp-10 tablet, R-0Normal  -     albuterol sulfate HFA (VENTOLIN HFA) 108 (90 Base) MCG/ACT inhaler; Inhale 2 puffs into the lungs 4 times daily as needed for Wheezing, Disp-18 g, R-0Normal     Results for POC orders placed in visit on 09/27/22   POCT rapid strep A   Result Value Ref Range    Strep A Ag None Detected None Detected     COVID pending, tx plans may change pending results. Continue over-the-counter medications as needed for symptoms. Use honey to the back of throat, salt water gargle as needed for sore throat, cough. Go to the ER for shortness of breath, chest pain. Patient verbalized understanding. Follow Up Instructions:      Return if symptoms worsen or fail to improve, for SOB, chest pain go to ER.     Orders Placed This Encounter   Medications    predniSONE (DELTASONE) 20 MG tablet     Sig: Take 2 tablets by mouth daily for 5 days     Dispense:  10 tablet     Refill:  0    albuterol sulfate HFA (VENTOLIN HFA) 108 (90 Base) MCG/ACT inhaler     Sig: Inhale 2 puffs into the lungs 4 times daily as needed for Wheezing     Dispense:  18 g     Refill:  0           Will send out COVID19 testing. Possible treatment alterations based on the results. Patient instructed to self-quarantine until testing results are back. Patient instructed not to return to work until results are back. Tylenol as needed for fever/pain. Encouraged adequate hydration and rest.  The patient indicates understanding of these issues and agrees with the plan. Educational materials provided on AVS.  Follow up if symptoms do not improve/worsen. Discussed symptoms that will warrant urgent ED evaluation/treatment. Patient and/or parent given educational materials - see patient instructions. Discussed use, benefit, and side effects of prescribed medications. All patient questions answered. Patient and/or parent voiced understanding.       Electronically signed by IONA Chen 9/27/2022 at 12:18 PM

## 2022-09-27 NOTE — TELEPHONE ENCOUNTER
From: Grant Thomas  To: Laura Haley  Sent: 9/27/2022 2:51 PM EDT  Subject: Blood Oxygen Level    Good afternoon. I was just in for a strep and covid test earlier today. My blood O2 levels are currently ranging from 92-96 just laying here according to my home pulse oximeter, and I was wondering if there's anything I need to do about that. I do have the inhaler and used it a couple hours ago and took one of the oral steroids at the same time.  Thank you

## 2022-09-28 DIAGNOSIS — Z20.822 CONTACT WITH AND (SUSPECTED) EXPOSURE TO COVID-19: ICD-10-CM

## 2022-09-28 LAB
SARS-COV-2: NORMAL
SARS-COV-2: NOT DETECTED
SOURCE: NORMAL

## 2022-09-28 RX ORDER — CIMETIDINE 800 MG
800 TABLET ORAL 2 TIMES DAILY
Qty: 30 TABLET | Refills: 3 | Status: SHIPPED | OUTPATIENT
Start: 2022-09-28

## 2022-09-28 RX ORDER — CIMETIDINE 800 MG
800 TABLET ORAL 2 TIMES DAILY
Qty: 90 TABLET | Refills: 3 | OUTPATIENT
Start: 2022-09-28 | End: 2022-10-28

## 2022-10-02 ENCOUNTER — OFFICE VISIT (OUTPATIENT)
Dept: PRIMARY CARE CLINIC | Age: 27
End: 2022-10-02
Payer: COMMERCIAL

## 2022-10-02 VITALS
HEART RATE: 94 BPM | TEMPERATURE: 98.6 F | SYSTOLIC BLOOD PRESSURE: 118 MMHG | OXYGEN SATURATION: 96 % | DIASTOLIC BLOOD PRESSURE: 88 MMHG | BODY MASS INDEX: 34.75 KG/M2 | WEIGHT: 190 LBS

## 2022-10-02 DIAGNOSIS — J40 BRONCHITIS: Primary | ICD-10-CM

## 2022-10-02 DIAGNOSIS — H65.93 FLUID LEVEL BEHIND TYMPANIC MEMBRANE OF BOTH EARS: ICD-10-CM

## 2022-10-02 PROCEDURE — 99214 OFFICE O/P EST MOD 30 MIN: CPT

## 2022-10-02 RX ORDER — AZITHROMYCIN 250 MG/1
250 TABLET, FILM COATED ORAL SEE ADMIN INSTRUCTIONS
Qty: 6 TABLET | Refills: 0 | Status: SHIPPED | OUTPATIENT
Start: 2022-10-02 | End: 2022-10-07

## 2022-10-02 RX ORDER — MECLIZINE HYDROCHLORIDE 25 MG/1
25 TABLET ORAL 3 TIMES DAILY PRN
Qty: 15 TABLET | Refills: 0 | Status: SHIPPED | OUTPATIENT
Start: 2022-10-02 | End: 2022-10-12

## 2022-10-02 ASSESSMENT — ENCOUNTER SYMPTOMS
HEMOPTYSIS: 0
RHINORRHEA: 1
VOICE CHANGE: 0
VOMITING: 0
CHANGE IN BOWEL HABIT: 0
TROUBLE SWALLOWING: 0
COUGH: 1
WHEEZING: 0
SHORTNESS OF BREATH: 1
SPUTUM PRODUCTION: 0
APNEA: 0
ORTHOPNEA: 1
EYE PAIN: 0
EYE ITCHING: 0
CHOKING: 0
CHEST TIGHTNESS: 0
NAUSEA: 0
ABDOMINAL PAIN: 0
SORE THROAT: 1

## 2022-10-02 NOTE — PROGRESS NOTES
[facility]  [address] [address]  Dept: [phone]  Dept Fax: [phone]    Анна Escalante is a 33 y.o. adult who presents to the urgent care today for his medical conditions/complaints as notedbelow. Анна Escalante is c/o of Shortness of Breath and Dizziness      HPI:     Patient was seen in walk in on 9/27/2022, Rapid strep negative and COVID negative. Patient was given prednisone and a albuterol inhaler. Patient presents to the walk in for SOB and dizziness    Shortness of Breath  This is a new problem. The current episode started in the past 7 days. The problem occurs intermittently. The problem has been gradually worsening (Patient reports that patient is SOB at all times and worse on exertion). Associated symptoms include orthopnea, PND, rhinorrhea and a sore throat. Pertinent negatives include no abdominal pain, chest pain, claudication, coryza, ear pain, fever, headaches, hemoptysis, leg pain, leg swelling, rash, sputum production, syncope, vomiting or wheezing. Nothing aggravates the symptoms. The patient has no known risk factors for DVT/PE. Treatments tried: prednisone and albuterol inhaler. The treatment provided no relief. His past medical history is significant for allergies (claritin and flonase). There is no history of aspirin allergies, asthma, bronchiolitis, chronic lung disease, COPD, DVT, a heart failure, PE or pneumonia. Dizziness  This is a new problem. The current episode started in the past 7 days. The problem occurs intermittently. The problem has been gradually worsening. Associated symptoms include chills, coughing, fatigue and a sore throat. Pertinent negatives include no abdominal pain, anorexia, arthralgias, change in bowel habit, chest pain, congestion, diaphoresis, fever, headaches, nausea, rash or vomiting. Nothing aggravates the symptoms.  He has tried nothing for the symptoms.      Past Medical History:   Diagnosis Date    Concussion with no loss of consciousness 4/8/2022    Depression         Current Outpatient Medications   Medication Sig Dispense Refill    meclizine (ANTIVERT) 25 MG tablet Take 1 tablet by mouth 3 times daily as needed for Dizziness 15 tablet 0    azithromycin (ZITHROMAX) 250 MG tablet Take 1 tablet by mouth See Admin Instructions for 5 days 500mg on day 1 followed by 250mg on days 2 - 5 6 tablet 0    cimetidine (TAGAMET) 800 MG tablet Take 1 tablet by mouth 2 times daily 30 tablet 3    predniSONE (DELTASONE) 20 MG tablet Take 2 tablets by mouth daily for 5 days 10 tablet 0    albuterol sulfate HFA (VENTOLIN HFA) 108 (90 Base) MCG/ACT inhaler Inhale 2 puffs into the lungs 4 times daily as needed for Wheezing 18 g 0    ketoconazole (NIZORAL) 2 % shampoo Apply 3-4 times weekly to scalp, leave on for five minutes prior to washing off 120 mL 2    VITAMIN D PO Take 1.5 mg by mouth Twice a Week      fluticasone (FLONASE) 50 MCG/ACT nasal spray 2 sprays by Each Nostril route daily 48 g 1    triamcinolone (KENALOG) 0.1 % cream Apply to rash twice daily (not face, armpit or groin) 80 g 1    fluocinonide (LIDEX) 0.05 % external solution Apply to scalp daily for rash 60 mL 2    B-D 3CC LUER-LENA SYR 22BW1-6/2 22G X 1-1/2\" 3 ML MISC       busPIRone (BUSPAR) 10 MG tablet Patient takes 2 tablets at one time to equal 20 mg      vitamin D (ERGOCALCIFEROL) 1.25 MG (33053 UT) CAPS capsule       BD DISP NEEDLE 23G X 1\" MISC       testosterone cypionate (DEPOTESTOTERONE CYPIONATE) 200 MG/ML injection       loratadine (CLARITIN) 10 MG tablet Claritin TABS   Refills: 0    Active      levothyroxine (SYNTHROID) 50 MCG tablet Take 50 mcg by mouth Daily      Ferrous Sulfate (IRON PO) Take by mouth      emtricitabine-tenofovir alafenamide (DESCOVY) 200-25 MG TABS tablet Take 1 tablet by mouth daily      atorvastatin (LIPITOR) 10 MG tablet TAKE 1 TABLET BY MOUTH ONE TIME A DAY  3 vilazodone HCl (VIIBRYD) 20 MG TABS Take 20 mg by mouth in the morning. cimetidine (TAGAMET) 800 MG tablet Take 1 tablet by mouth 2 times daily 90 tablet 3     No current facility-administered medications for this visit. Allergies   Allergen Reactions    Bupropion      Unknown Reaction. Adhesive Tape Dermatitis and Rash       Subjective:      Review of Systems   Constitutional:  Positive for chills and fatigue. Negative for diaphoresis and fever. HENT:  Positive for rhinorrhea and sore throat. Negative for congestion, ear pain, trouble swallowing and voice change. Eyes:  Negative for pain and itching. Respiratory:  Positive for cough and shortness of breath. Negative for apnea, hemoptysis, sputum production, choking, chest tightness and wheezing. Cardiovascular:  Positive for orthopnea and PND. Negative for chest pain, claudication, leg swelling and syncope. Gastrointestinal:  Negative for abdominal pain, anorexia, change in bowel habit, nausea and vomiting. Genitourinary:  Negative for difficulty urinating and dysuria. Musculoskeletal:  Negative for arthralgias. Skin:  Negative for rash. Allergic/Immunologic: Positive for environmental allergies. Negative for food allergies. Neurological:  Positive for dizziness. Negative for seizures, syncope, speech difficulty and headaches. All other systems reviewed and are negative. 14 systems reviewed and negative except as listed in HPI. Objective:     Physical Exam  Vitals reviewed. Constitutional:       General: He is not in acute distress. Appearance: Normal appearance. He is obese. He is not ill-appearing, toxic-appearing or diaphoretic. HENT:      Head: Normocephalic. Right Ear: No swelling or tenderness. A middle ear effusion is present. Tympanic membrane is not injected or erythematous. Left Ear: No swelling or tenderness. A middle ear effusion is present. Tympanic membrane is not injected or erythematous. Nose: Nose normal. No congestion or rhinorrhea. Right Sinus: No maxillary sinus tenderness or frontal sinus tenderness. Left Sinus: No maxillary sinus tenderness or frontal sinus tenderness. Mouth/Throat:      Lips: Pink. Mouth: Mucous membranes are moist.      Pharynx: Oropharynx is clear. Posterior oropharyngeal erythema present. No pharyngeal swelling or oropharyngeal exudate. Eyes:      General:         Right eye: No discharge. Left eye: No discharge. Extraocular Movements: Extraocular movements intact. Conjunctiva/sclera: Conjunctivae normal.      Pupils: Pupils are equal, round, and reactive to light. Cardiovascular:      Rate and Rhythm: Normal rate and regular rhythm. Heart sounds: Normal heart sounds. No murmur heard. Pulmonary:      Effort: Pulmonary effort is normal. No respiratory distress. Breath sounds: Normal breath sounds. No stridor. No wheezing, rhonchi or rales. Chest:      Chest wall: No tenderness. Musculoskeletal:         General: No swelling, tenderness, deformity or signs of injury. Normal range of motion. Cervical back: Normal range of motion and neck supple. No rigidity or tenderness. Right lower leg: No edema. Left lower leg: No edema. Lymphadenopathy:      Cervical: No cervical adenopathy. Skin:     General: Skin is warm and dry. Capillary Refill: Capillary refill takes less than 2 seconds. Findings: No erythema or rash. Neurological:      Mental Status: He is alert and oriented to person, place, and time. Motor: No weakness. Coordination: Coordination normal.      Gait: Gait normal.   Psychiatric:         Mood and Affect: Mood normal.         Behavior: Behavior normal.         Thought Content:  Thought content normal.         Judgment: Judgment normal.     /88   Pulse 94   Temp 98.6 °F (37 °C)   Wt 190 lb (86.2 kg)   SpO2 96%   BMI 34.75 kg/m²     Assessment:       Diagnosis Orders 1. Bronchitis  azithromycin (ZITHROMAX) 250 MG tablet      2. Fluid level behind tympanic membrane of both ears  meclizine (ANTIVERT) 25 MG tablet          Plan:   -Patient sitting comfortably with no signs of respiratory distress at this time  -Based on the physical exam findings-- I believe the symptoms are related to OME. -Flonase daily recommended. -Meclizine as needed for the dizziness/nausea. -Based on the duration and severity of the symptoms-- I will treat this as bacterial at this time.  -Patient instructed to complete antibiotic prescription fully. -May use Motrin/Tylenol for fever/pain.  -Saline washes, salt water gargles and over the counter preparations if desired.  -Cont albuterol as needed  -Finish prednisone today  -Go to ER for any chest pain, SOB or worsening symptoms  -May need a chest xray if symptoms not improving in the next 24-48 hours   -Patient agreeable to treatment plan.  -Educational materials provided on AVS.  -Follow up if symptoms do not improve/worsen. Return if symptoms worsen or fail to improve. Orders Placed This Encounter   Medications    meclizine (ANTIVERT) 25 MG tablet     Sig: Take 1 tablet by mouth 3 times daily as needed for Dizziness     Dispense:  15 tablet     Refill:  0    azithromycin (ZITHROMAX) 250 MG tablet     Sig: Take 1 tablet by mouth See Admin Instructions for 5 days 500mg on day 1 followed by 250mg on days 2 - 5     Dispense:  6 tablet     Refill:  0         Patient given educational materials - see patient instructions. Discussed use, benefit, and side effects of prescribed medications. All patient questions answered. Pt voiced understanding.     Electronically signed by IONA Grigsby NP on 10/2/2022 at 3:53 PM

## 2022-10-06 ENCOUNTER — OFFICE VISIT (OUTPATIENT)
Dept: GASTROENTEROLOGY | Age: 27
End: 2022-10-06
Payer: COMMERCIAL

## 2022-10-06 VITALS
BODY MASS INDEX: 35.51 KG/M2 | SYSTOLIC BLOOD PRESSURE: 117 MMHG | DIASTOLIC BLOOD PRESSURE: 74 MMHG | WEIGHT: 193 LBS | HEIGHT: 62 IN | HEART RATE: 70 BPM

## 2022-10-06 DIAGNOSIS — K21.9 GASTROESOPHAGEAL REFLUX DISEASE, UNSPECIFIED WHETHER ESOPHAGITIS PRESENT: Primary | ICD-10-CM

## 2022-10-06 PROCEDURE — G8417 CALC BMI ABV UP PARAM F/U: HCPCS | Performed by: INTERNAL MEDICINE

## 2022-10-06 PROCEDURE — G8484 FLU IMMUNIZE NO ADMIN: HCPCS | Performed by: INTERNAL MEDICINE

## 2022-10-06 PROCEDURE — 99213 OFFICE O/P EST LOW 20 MIN: CPT | Performed by: INTERNAL MEDICINE

## 2022-10-06 PROCEDURE — G8427 DOCREV CUR MEDS BY ELIG CLIN: HCPCS | Performed by: INTERNAL MEDICINE

## 2022-10-06 PROCEDURE — 1036F TOBACCO NON-USER: CPT | Performed by: INTERNAL MEDICINE

## 2022-10-06 ASSESSMENT — ENCOUNTER SYMPTOMS
SORE THROAT: 0
RECTAL PAIN: 1
ABDOMINAL PAIN: 1
ABDOMINAL DISTENTION: 0
CONSTIPATION: 0
NAUSEA: 0
ANAL BLEEDING: 0
VOMITING: 0
ALLERGIC/IMMUNOLOGIC NEGATIVE: 1
DIARRHEA: 1
BLOOD IN STOOL: 0
EYES NEGATIVE: 1
TROUBLE SWALLOWING: 0

## 2022-10-06 NOTE — PROGRESS NOTES
Reason for Referral: Chronic GERD and irregular bowel habits    Dairy appears to be significant because of the patient's symptoms  Responding well to Tagamet      Chief Complaint   Patient presents with    Follow Up After Procedure     EGD/[facility]           HISTORY OF PRESENT ILLNESS: Noel Casper is a 33 y.o. adult with a past history remarkable for depression, gender dysphoria, mastectomy for cosmetic reasons, on testosterone supplementation, history of depression, chronic history of GERD and irregular bowel movements for several years, referred for evaluation of GERD and irregular bowel habits. Patient reports irregular bowel habits alternating from constipation diarrhea, diarrhea predominant, no obvious dietary triggers reported by the patient. No melena, hematochezia, bright red blood per rectum. Mild steatorrhea, does report isolated episodes of blood-tinged stools. Denies any tenesmus. Does report mild abdominal cramping at times that appeared not to be associated with bowel frequency or episodes. Upper GI symptoms of GERD I do not appear to be related to lower GI symptoms. Denies any unintentional weight loss. No recent endoscopic evaluation. Smoker: None  Drinking history: Socially  Illicit drugs: Cannibus. Abdominal surgeries: None   Prior Colonoscopy: None   Prior EGD: None   FH of GI issues: None    Jan 2022-mastectomy     Past Medical,Family, and Social History reviewed and does contribute to the patient presentingcondition. Patient's PMH/PSH,SH,PSYCH Hx, MEDs, ALLERGIES, and ROS were all reviewed and updated in the appropriate sections.     PAST MEDICAL HISTORY:  Past Medical History:   Diagnosis Date    Concussion with no loss of consciousness 4/8/2022    Depression        Past Surgical History:   Procedure Laterality Date    COLONOSCOPY  07/06/2022    COLONOSCOPY DIAGNOSTIC     COLONOSCOPY N/A 7/6/2022    COLONOSCOPY WITH BIOPSY performed by Mack King MD at [facility] OR    ESOPHAGOGASTRODUODENOSCOPY  07/06/2022     EGD BIOPSY     MASTECTOMY Bilateral 01/20/2021    UPPER GASTROINTESTINAL ENDOSCOPY N/A 7/6/2022    EGD BIOPSY performed by Gerardo Zuluaga MD at [facility]:    Current Outpatient Medications:     meclizine (ANTIVERT) 25 MG tablet, Take 1 tablet by mouth 3 times daily as needed for Dizziness, Disp: 15 tablet, Rfl: 0    albuterol sulfate HFA (VENTOLIN HFA) 108 (90 Base) MCG/ACT inhaler, Inhale 2 puffs into the lungs 4 times daily as needed for Wheezing, Disp: 18 g, Rfl: 0    ketoconazole (NIZORAL) 2 % shampoo, Apply 3-4 times weekly to scalp, leave on for five minutes prior to washing off, Disp: 120 mL, Rfl: 2    VITAMIN D PO, Take 1.5 mg by mouth Twice a Week, Disp: , Rfl:     fluticasone (FLONASE) 50 MCG/ACT nasal spray, 2 sprays by Each Nostril route daily, Disp: 48 g, Rfl: 1    triamcinolone (KENALOG) 0.1 % cream, Apply to rash twice daily (not face, armpit or groin), Disp: 80 g, Rfl: 1    fluocinonide (LIDEX) 0.05 % external solution, Apply to scalp daily for rash, Disp: 60 mL, Rfl: 2    B-D 3CC LUER-LENA SYR 32RV5-8/2 22G X 1-1/2\" 3 ML MISC, , Disp: , Rfl:     busPIRone (BUSPAR) 10 MG tablet, Patient takes 2 tablets at one time to equal 20 mg, Disp: , Rfl:     vitamin D (ERGOCALCIFEROL) 1.25 MG (74946 UT) CAPS capsule, , Disp: , Rfl:     BD DISP NEEDLE 23G X 1\" MISC, , Disp: , Rfl:     testosterone cypionate (DEPOTESTOTERONE CYPIONATE) 200 MG/ML injection, , Disp: , Rfl:     loratadine (CLARITIN) 10 MG tablet, Claritin TABS  Refills: 0  Active, Disp: , Rfl:     levothyroxine (SYNTHROID) 50 MCG tablet, Take 50 mcg by mouth Daily, Disp: , Rfl:     Ferrous Sulfate (IRON PO), Take by mouth, Disp: , Rfl:     emtricitabine-tenofovir alafenamide (DESCOVY) 200-25 MG TABS tablet, Take 1 tablet by mouth daily, Disp: , Rfl:     atorvastatin (LIPITOR) 10 MG tablet, TAKE 1 TABLET BY MOUTH ONE TIME A DAY, Disp: , Rfl: 3    vilazodone HCl (VIIBRYD) 20 MG TABS, Take 20 mg by mouth in the morning., Disp: , Rfl:     cimetidine (TAGAMET) 800 MG tablet, Take 1 tablet by mouth 2 times daily (Patient not taking: Reported on 10/6/2022), Disp: 30 tablet, Rfl: 3    cimetidine (TAGAMET) 800 MG tablet, Take 1 tablet by mouth 2 times daily, Disp: 90 tablet, Rfl: 3    ALLERGIES:   Allergies   Allergen Reactions    Bupropion      Unknown Reaction. Adhesive Tape Dermatitis and Rash       FAMILY HISTORY:       Problem Relation Age of Onset    Cancer Mother 48        Cervical    Alcohol Abuse Mother     Substance Abuse Mother     Alcohol Abuse Father     Substance Abuse Father          SOCIAL HISTORY:   Social History     Socioeconomic History    Marital status: Single     Spouse name: Not on file    Number of children: Not on file    Years of education: Not on file    Highest education level: Not on file   Occupational History    Not on file   Tobacco Use    Smoking status: Never    Smokeless tobacco: Never   Vaping Use    Vaping Use: Never used   Substance and Sexual Activity    Alcohol use: Yes     Comment: Social use    Drug use: Yes     Types: Marijuana (Weed)     Comment: Social, maybe once a month    Sexual activity: Not on file   Other Topics Concern    Not on file   Social History Narrative    Not on file     Social Determinants of Health     Financial Resource Strain: Low Risk     Difficulty of Paying Living Expenses: Not hard at all   Food Insecurity: No Food Insecurity    Worried About Running Out of Food in the Last Year: Never true    Ran Out of Food in the Last Year: Never true   Transportation Needs: Not on file   Physical Activity: Not on file   Stress: Not on file   Social Connections: Not on file   Intimate Partner Violence: Not on file   Housing Stability: Not on file         REVIEW OF SYSTEMS: A 12-point review of systems was obtained and pertinent positives and negatives were listed below.      REVIEW OF SYSTEMS:     Constitutional: No fever, no chills, no lethargy, no weakness. HEENT:  No headache, otalgia, itchy eyes, nasal discharge or sore throat. Cardiac:  No chest pain, dyspnea, orthopnea or PND. Chest:   No cough, phlegm or wheezing. Abdomen:      Detailed by MA   Neuro:  No focal weakness, abnormal movements or seizure like activity. Skin:   No rashes, no itching. :   No hematuria, no pyuria, no dysuria, no flank pain. Extremities:  No swelling or joint pains. ROS was otherwise negative    Review of Systems   Constitutional:  Positive for fatigue. HENT: Negative. Negative for sore throat and trouble swallowing. Eyes: Negative. Gastrointestinal:  Positive for abdominal pain, diarrhea and rectal pain. Negative for abdominal distention, anal bleeding, blood in stool, constipation, nausea and vomiting. Endocrine: Negative. Genitourinary: Negative. Skin: Negative. Allergic/Immunologic: Negative. Neurological:  Positive for dizziness, light-headedness and headaches. PHYSICAL EXAMINATION: Vital signs reviewed per the nursing documentation. /74 (Site: Left Upper Arm, Position: Sitting, Cuff Size: Small Adult)   Pulse 70   Ht 5' 2\" (1.575 m)   Wt 193 lb (87.5 kg)   BMI 35.30 kg/m²   Body mass index is 35.3 kg/m². Physical Exam    Physical Exam   Constitutional: Patient is oriented to person, place, and time. Patient appears well-developed and well-nourished. HENT:   Head: Normocephalic and atraumatic. Eyes: Pupils are equal, round, and reactive to light. EOM are normal.   Neck: Normal range of motion. Neck supple. No JVD present. No tracheal deviation present. No thyromegaly present. Cardiovascular: Normal rate, regular rhythm, normal heart sounds and intact distal pulses. Pulmonary/Chest: Effort normal and breath sounds normal. No stridor. No respiratory distress. He has no wheezes. He has no rales. He exhibits no tenderness. Abdominal: Soft.  Bowel sounds are normal. He exhibits no distension and no mass. There is no tenderness. There is no rebound and no guarding. No hernia. Musculoskeletal: Normal range of motion. Lymphadenopathy:    Patient has no cervical adenopathy. Neurological: Patient is alert and oriented to person, place, and time. Psychiatric: Patient has a normal mood and affect. Patient behavior is normal.       LABORATORY DATA: Reviewed  Lab Results   Component Value Date    WBC 7.66 03/10/2022    HGB 15.6 03/10/2022    HCT 45.9 03/10/2022    MCV 95.0 03/10/2022     03/10/2022     03/10/2022    K 4.9 03/10/2022     03/10/2022    CO2 28 03/10/2022    BUN 11 03/10/2022    CREATININE 1.05 03/10/2022    LABPROT 6.9 02/07/2013    LABALBU 4.7 03/10/2022    BILITOT 0.5 03/10/2022    ALKPHOS 77 03/10/2022    AST 44 03/10/2022    ALT 68 03/10/2022         Lab Results   Component Value Date    RBC 4.83 03/10/2022    HGB 15.6 03/10/2022    MCV 95.0 03/10/2022    MCH 32.3 03/10/2022    MCHC 34.0 03/10/2022    RDW 43.0 03/10/2022    MPV 8.1 02/07/2013    BASOPCT 0.9 03/10/2022    LYMPHSABS 2.71 03/10/2022    MONOSABS 0.63 03/10/2022    NEUTROABS 4.06 03/10/2022    EOSABS 0.17 03/10/2022    BASOSABS 0.07 03/10/2022         DIAGNOSTIC TESTING:     CT Head WO Contrast    Result Date: 4/2/2022  EXAMINATION: CT OF THE HEAD WITHOUT CONTRAST  4/2/2022 3:24 pm TECHNIQUE: CT of the head was performed without the administration of intravenous contrast. Dose modulation, iterative reconstruction, and/or weight based adjustment of the mA/kV was utilized to reduce the radiation dose to as low as reasonably achievable. COMPARISON: None.  HISTORY: ORDERING SYSTEM PROVIDED HISTORY: fall TECHNOLOGIST PROVIDED HISTORY: fall Decision Support Exception - unselect if not a suspected or confirmed emergency medical condition->Emergency Medical Condition (MA) Reason for Exam: Fall, hit head, some trouble with short term memory loss FINDINGS: BRAIN/VENTRICLES: There is no acute intracranial hemorrhage, mass effect or midline shift. Hydrocephalus. The patient has an arachnoid cyst in the posterior fossa 3.5 x 1.2 x 2.5 cm. No other remarkable extra-axial fluid collections are noted. The gray-white differentiation is maintained without evidence of an acute infarct. Cortical atrophy, mild, but remarkable for age is noted. ORBITS: The visualized portion of the orbits demonstrate no acute abnormality. SINUSES: The visualized paranasal sinuses and mastoid air cells demonstrate no acute abnormality. SOFT TISSUES/SKULL:  No acute abnormality of the visualized skull or soft tissues. No acute intracranial abnormality. Midline posterior fossa extra-axial fluid collection appearance favoring arachnoid cyst.  Minimal-to-mild cortical atrophy, prominent for age. IMPRESSION: Suellen So is a 23 y.o. adult with a past history remarkable for depression, gender dysphoria, mastectomy for cosmetic reasons, on testosterone supplementation, history of depression, chronic history of GERD and irregular bowel movements for several years, referred for evaluation of GERD and irregular bowel habits. Patient reports irregular bowel habits alternating from constipation diarrhea, diarrhea predominant, no obvious dietary triggers reported by the patient. No melena, hematochezia, bright red blood per rectum. Mild steatorrhea, does report isolated episodes of blood-tinged stools. Denies any tenesmus. Does report mild abdominal cramping at times that appeared not to be associated with bowel frequency or episodes. Upper GI symptoms of GERD I do not appear to be related to lower GI symptoms. Denies any unintentional weight loss. No recent endoscopic evaluation. Assessment  1. Gastroesophageal reflux disease, unspecified whether esophagitis present        Ana Mays was seen today for new patient and gastroesophageal reflux.     Diagnoses and all orders for this visit:    Diarrhea, unspecified type-likely related to dairy intolerance, patient avoiding dairy during the time I see no significant provement. Significant response to the patient dietary changes. EGD and colonoscopy was negative. Biopsies were negative for any cause of the patient's symptoms. No evidence of microscopic colitis. Gastroesophageal reflux disease without esophagitis-continue with Tagamet 800 mg twice daily. RTC: 3 months. Additional comments:      Lactose intolerant  Sensitivities. Thank you for allowing me to participate in the care of Mr. Irma Chung. For any further questions please do not hesitate to contact me. I have reviewed and agree with the MA/LPN ROS please refer to their documentation from today's encounter on a separate note. Mesfin Uribe MD, MPH   Board Certified in Gastroenterology  Board Certified in [address] [place] [address] #: [phone]          this note is created with the assistance of a speech recognition program.  While intending to generate a document that actually reflects the content of the visit, the document can still have some errors including those of syntax and sound a like substitutions which may escape proof reading. It such instances, actual meaning can be extrapolated by contextual diversion.

## 2022-11-09 ENCOUNTER — TELEPHONE (OUTPATIENT)
Dept: PODIATRY | Age: 27
End: 2022-11-09

## 2022-11-09 NOTE — TELEPHONE ENCOUNTER
Regarding: RE: Orthodics  Could we schedule an appt for patient to come in with orthotics?     ----- Message -----  From: Tracy Gillespie MA  Sent: 11/8/2022   9:17 AM EST  To: Mikael Boyd DPM  Subject: Orthodics                                        ----- Message from Surendra Jones sent at 11/8/2022  9:17 AM EST -----       ----- Message sent from Tracy Gillespie MA to Vidhi Edwards \"Jh\" at 11/8/2022  9:16 AM -----   Jt Nicely you for using [facility]. This message has been forwarded to your [facility]) provider. Please allow your provider some time to review the message and act on it as necessary. You should hear something within 24 business hours. If you don't, please respond to this message or call the office. ----- Message -----       From:Karon Monroy       Sent:11/7/2022  4:38 PM EST         To:Eleanor Sol DPM    Subject:Orthodics    Good evening! I've been wearing my orthodics to break them in for a bit now and I'm wondering if it's an issue that my shoe size is a wide. The edge of the orthodics press into the side of my foot and cause some discomfort, especially after extended wear.

## 2022-11-11 ENCOUNTER — OFFICE VISIT (OUTPATIENT)
Dept: PODIATRY | Age: 27
End: 2022-11-11
Payer: COMMERCIAL

## 2022-11-11 VITALS — BODY MASS INDEX: 35.51 KG/M2 | WEIGHT: 193 LBS | HEIGHT: 62 IN

## 2022-11-11 DIAGNOSIS — M21.42 PES PLANUS OF BOTH FEET: ICD-10-CM

## 2022-11-11 DIAGNOSIS — M72.2 PLANTAR FASCIITIS: Primary | ICD-10-CM

## 2022-11-11 DIAGNOSIS — M21.41 PES PLANUS OF BOTH FEET: ICD-10-CM

## 2022-11-11 PROCEDURE — G8484 FLU IMMUNIZE NO ADMIN: HCPCS | Performed by: PODIATRIST

## 2022-11-11 PROCEDURE — G8427 DOCREV CUR MEDS BY ELIG CLIN: HCPCS | Performed by: PODIATRIST

## 2022-11-11 PROCEDURE — 99213 OFFICE O/P EST LOW 20 MIN: CPT | Performed by: PODIATRIST

## 2022-11-11 PROCEDURE — 1036F TOBACCO NON-USER: CPT | Performed by: PODIATRIST

## 2022-11-11 PROCEDURE — G8417 CALC BMI ABV UP PARAM F/U: HCPCS | Performed by: PODIATRIST

## 2022-11-11 NOTE — PROGRESS NOTES
[address] [facility] [address] [address]  Dept: [phone]  Dept Fax: [phone]    RETURN PATIENT PROGRESS NOTE  Date of patient's visit: 11/11/2022  Patient's Name:  Keith Brush YOB: 1995            Patient Care Team:  Romana Baumann, APRN - NP as PCP - General (Nurse Practitioner)  Romana Baumann, APRN - NP as PCP - RE[facility]ABILITATION [facility]  Jose Pickett DPM as Consulting Physician (Podiatry)       Keith Brush 33 y.o. adult that presents for follow-up of   Chief Complaint   Patient presents with    Foot Pain     Has orthotics and would like them adjusted. They are rubbing on the inside of his foot     Pt's primary care physician is Romana Baumann, APRN - NP last seen June 30 2022  Symptoms began 2 month(s) ago and are decreased . Patient relates pain is Absent . Pain is rated 0 out of 10 and is described as none. Treatments prior to today's visit include: previous podiatry treatment, custom orthotics. Currently denies F/C/N/V. Patient states the orthotics are rubbing against the inside of his feet and causing some discomfort. Allergies   Allergen Reactions    Bupropion      Unknown Reaction. Adhesive Tape Dermatitis and Rash       Past Medical History:   Diagnosis Date    Concussion with no loss of consciousness 4/8/2022    Depression        Prior to Admission medications    Medication Sig Start Date End Date Taking?  Authorizing Provider   cimetidine (TAGAMET) 800 MG tablet Take 1 tablet by mouth 2 times daily 9/28/22  Yes Kevin Smith MD   albuterol sulfate HFA (VENTOLIN HFA) 108 (90 Base) MCG/ACT inhaler Inhale 2 puffs into the lungs 4 times daily as needed for Wheezing 9/27/22  Yes IONA Herrera CNP   ketoconazole (NIZORAL) 2 % shampoo Apply 3-4 times weekly to scalp, leave on for five minutes prior to washing off 8/17/22  Yes Chante Omer PA-C   VITAMIN D PO Take 1.5 mg by mouth Twice a Week   Yes Historical Provider, MD   fluticasone (FLONASE) 50 MCG/ACT nasal spray 2 sprays by Each Nostril route daily 5/3/22  Yes Kilo Hernández MD   triamcinolone (KENALOG) 0.1 % cream Apply to rash twice daily (not face, armpit or groin) 3/11/22  Yes Graciela Lee PA-C   fluocinonide (LIDEX) 0.05 % external solution Apply to scalp daily for rash 3/11/22  Yes Graciela Lee PA-C   B-D 3CC LUER-LENA SYR 97RU3-4/2 22G X 1-1/2\" 3 ML MISC  3/16/21  Yes Historical Provider, MD   busPIRone (BUSPAR) 10 MG tablet Patient takes 2 tablets at one time to equal 20 mg 11/18/20  Yes Historical Provider, MD   vitamin D (ERGOCALCIFEROL) 1.25 MG (11228 UT) CAPS capsule  11/13/20  Yes Historical Provider, MD   BD DISP NEEDLE 23G X 1\" [address] [facility]  11/16/20  Yes Historical Provider, MD   testosterone cypionate (DEPOTESTOTERONE CYPIONATE) 200 MG/ML injection  12/21/20  Yes Historical Provider, MD   loratadine (CLARITIN) 10 MG tablet Claritin TABS   Refills: 0    Active   Yes Historical Provider, MD   levothyroxine (SYNTHROID) 50 MCG tablet Take 50 mcg by mouth Daily   Yes Historical Provider, MD   Ferrous Sulfate (IRON PO) Take by mouth   Yes Historical Provider, MD   emtricitabine-tenofovir alafenamide (DESCOVY) 200-25 MG TABS tablet Take 1 tablet by mouth daily   Yes Historical Provider, MD   atorvastatin (LIPITOR) 10 MG tablet TAKE 1 TABLET BY MOUTH ONE TIME A DAY 11/26/19  Yes Historical Provider, MD   vilazodone HCl (VIIBRYD) 20 MG TABS Take 20 mg by mouth in the morning. Yes Historical Provider, MD   cimetidine (TAGAMET) 800 MG tablet Take 1 tablet by mouth 2 times daily 4/19/22 9/27/22  Buckley Duane, MD       Review of Systems    Review of Systems:  History obtained from chart review and the patient  General ROS: negative for - chills, fatigue, fever, night sweats or weight gain  Constitutional: Negative for chills, diaphoresis, fatigue, fever and unexpected weight change.   Musculoskeletal: Positive for arthralgias, gait problem and joint swelling. Neurological ROS: negative for - behavioral changes, confusion, headaches or seizures. Negative for weakness and numbness. Dermatological ROS: negative for - mole changes, rash  Cardiovascular: Negative for leg swelling. Gastrointestinal: Negative for constipation, diarrhea, nausea and vomiting. Lower Extremity Physical Examination:     Vitals: There were no vitals filed for this visit. General: AAO x 3 in NAD. Dermatologic Exam:  Skin lesion/ulceration Absent . Skin No rashes or nodules noted. .       Musculoskeletal:     1st MPJ ROM decreased, Bilateral.  Muscle strength 5/5, Bilateral.  Pain present upon palpation of medial plantar arch. Medial longitudinal arch, Bilateral WNL. Ankle ROM WNL,Bilateral.    Dorsally contracted digits absent digits 1-5 Bilateral.     Vascular: DP and PT pulses palpable 2/4, Bilateral.  CFT <3 seconds, Bilateral.  Hair growth present to the level of the digits, Bilateral.  Edema absent, Bilateral.  Varicosities absent, Bilateral. Erythema absent, Bilateral    Neurological: Sensation intact to light touch to level of digits, Bilateral.  Protective sensation intact 10/10 sites via 5.07/10g [place]-Kt Monofilament, Bilateral.  negative Tinel's, Bilateral.  negative Valleix sign, Bilateral.      Integument: Warm, dry, supple, Bilateral.  Open lesion absent, Bilateral.  Interdigital maceration absent to web spaces 1-4, Bilateral.  Nails are normal in length, thickness and color 1-5 bilateral.  Fissures absent, Bilateral.     Asessment: Patient is a 33 y.o. adult with:    Diagnosis Orders   1. Plantar fasciitis        2. Pes planus of both feet              Plan: Patient examined and evaluated. Current condition and treatment options discussed in detail. Advised pt to avoid walking barefoot. Orthotics sent in to be refurbished to lower medial border.  All labs were reviewed and all imagining including the above findings were reviewed PRIOR to the patients arrival and with the patient today. Previous patient encounter was reviewed. Encounters from the patients other medical providers were reviewed and noted. Time was spent educating the patient on proper care of the feet and ankles. All the above diagnosis were addressed at todays visit and all questions were answered. A total of 20 minutes was spent with this patients encounter which included charting after the patients visit    . Verbal and written instructions given to patient. Contact office with any questions/problems/concerns. No orders of the defined types were placed in this encounter. No orders of the defined types were placed in this encounter. RTC in 1month(s).     11/11/2022      Electronically signed by Anitha Ortiz DPM on 11/11/2022 at 11:25 AM  11/11/2022

## 2022-11-16 ENCOUNTER — PROCEDURE VISIT (OUTPATIENT)
Dept: OBGYN CLINIC | Age: 27
End: 2022-11-16
Payer: COMMERCIAL

## 2022-11-16 VITALS
HEIGHT: 62 IN | WEIGHT: 193 LBS | HEART RATE: 68 BPM | SYSTOLIC BLOOD PRESSURE: 112 MMHG | DIASTOLIC BLOOD PRESSURE: 74 MMHG | BODY MASS INDEX: 35.51 KG/M2

## 2022-11-16 DIAGNOSIS — Z78.9 FEMALE-TO-MALE TRANSGENDER PERSON: ICD-10-CM

## 2022-11-16 DIAGNOSIS — Z30.017 NEXPLANON INSERTION: Primary | ICD-10-CM

## 2022-11-16 PROCEDURE — 11981 INSERTION DRUG DLVR IMPLANT: CPT | Performed by: OBSTETRICS & GYNECOLOGY

## 2022-11-16 RX ORDER — CIMETIDINE 800 MG
TABLET ORAL
Qty: 30 TABLET | Refills: 3 | Status: SHIPPED | OUTPATIENT
Start: 2022-11-16

## 2022-11-16 NOTE — PROGRESS NOTES
[address] [facility] - [address] [facility]  Dept: [phone]  11/16/22      Chief Complaint   Patient presents with    Procedure     Nexplanon Insertion        Katt Cardenas is a 34 yo female who presents for Nexplanon placement. He has a history of AUB and would like to have Nexplanon placed to help.       Past Medical History:   Diagnosis Date    Concussion with no loss of consciousness 4/8/2022    Depression          Past Surgical History:   Procedure Laterality Date    COLONOSCOPY  07/06/2022    COLONOSCOPY DIAGNOSTIC     COLONOSCOPY N/A 7/6/2022    COLONOSCOPY WITH BIOPSY performed by Anette Parish MD at [address] [facility].    ESOPHAGOGASTRODUODENOSCOPY  07/06/2022     EGD BIOPSY     MASTECTOMY Bilateral 01/20/2021    UPPER GASTROINTESTINAL ENDOSCOPY N/A 7/6/2022    EGD BIOPSY performed by Anette Parish MD at [address] EXTRACTION         Family History   Problem Relation Age of Onset    Cancer Mother 48        Cervical    Alcohol Abuse Mother     Substance Abuse Mother     Alcohol Abuse Father     Substance Abuse Father        Social History     Tobacco Use    Smoking status: Never    Smokeless tobacco: Never   Vaping Use    Vaping Use: Never used   Substance Use Topics    Alcohol use: Yes     Comment: Social use    Drug use: Yes     Types: Marijuana ([place]ighbor)     Comment: Social, maybe once a month       Current Outpatient Medications on File Prior to Visit   Medication Sig Dispense Refill    cimetidine (TAGAMET) 800 MG tablet Take 1 tablet by mouth 2 times daily 30 tablet 3    albuterol sulfate HFA (VENTOLIN HFA) 108 (90 Base) MCG/ACT inhaler Inhale 2 puffs into the lungs 4 times daily as needed for Wheezing 18 g 0    ketoconazole (NIZORAL) 2 % shampoo Apply 3-4 times weekly to scalp, leave on for five minutes prior to washing off 120 mL 2    VITAMIN D PO Take 1.5 mg by mouth Twice a Week fluticasone (FLONASE) 50 MCG/ACT nasal spray 2 sprays by Each Nostril route daily 48 g 1    triamcinolone (KENALOG) 0.1 % cream Apply to rash twice daily (not face, armpit or groin) 80 g 1    fluocinonide (LIDEX) 0.05 % external solution Apply to scalp daily for rash 60 mL 2    B-D 3CC LUER-LENA SYR 37FC2-7/2 22G X 1-1/2\" 3 ML MISC       busPIRone (BUSPAR) 10 MG tablet Patient takes 2 tablets at one time to equal 20 mg      vitamin D (ERGOCALCIFEROL) 1.25 MG (13504 UT) CAPS capsule       BD DISP NEEDLE 23G X 1\" MISC       testosterone cypionate (DEPOTESTOTERONE CYPIONATE) 200 MG/ML injection       loratadine (CLARITIN) 10 MG tablet Claritin TABS   Refills: 0    Active      levothyroxine (SYNTHROID) 50 MCG tablet Take 50 mcg by mouth Daily      Ferrous Sulfate (IRON PO) Take by mouth      emtricitabine-tenofovir alafenamide (DESCOVY) 200-25 MG TABS tablet Take 1 tablet by mouth daily      atorvastatin (LIPITOR) 10 MG tablet TAKE 1 TABLET BY MOUTH ONE TIME A DAY  3    vilazodone HCl (VIIBRYD) 20 MG TABS Take 20 mg by mouth in the morning. cimetidine (TAGAMET) 800 MG tablet Take 1 tablet by mouth 2 times daily 90 tablet 3     No current facility-administered medications on file prior to visit. Allergies as of 2022 - Fully Reviewed 2022   Allergen Reaction Noted    Bupropion  2016    Adhesive tape Dermatitis and Rash 2022         OB History    Para Term  AB Living   0 0 0 0 0 0   SAB IAB Ectopic Molar Multiple Live Births   0 0 0 0 0 0         Blood pressure 112/74, pulse 68, height 5' 2\" (1.575 m), weight 193 lb (87.5 kg), not currently breastfeeding. PROCEDURE:  The patient was positioned comfortably on our procedure table. She was consented earlier in the appointment and the procedure risk and complications were reviewed. A sterile prep and drape was completed and a 1% xylocaine for local anesthetic was utilized, 2 ml. The device was deployed just under the skin. The patient palpated the device just under the skin. A sterile dressing and steri-strip was applied with a pressure wrap. The patient tolerated the procedure well. Formal restrictions were discussed in detail. She is to notify our office if any swelling, redness, temperature, or limb restriction or numbness. Assessment:   Diagnosis Orders   1. Nexplanon insertion  IN INSERTION DRUG DELIVERY IMPLANT      2.  Female-to-male transgender person          Patient Active Problem List    Diagnosis Date Noted    Dyspepsia      Priority: Medium    Abnormal bowel habits      Priority: Medium    Gastritis without bleeding      Priority: Medium    Migraines 06/30/2022     Priority: Medium    Female-to-male transgender person 05/23/2022     Priority: Medium    Arachnoid cyst 04/08/2022    Hyperlipidemia 03/13/2022    Other fatigue 03/13/2022    Acute diarrhea 03/13/2022    Gastroesophageal reflux disease without esophagitis 03/13/2022    Allergic rhinitis 03/13/2022    Dermatitis 03/13/2022    Congenital pes planus 03/13/2022    Hypothyroidism 03/08/2022    Vitamin D deficiency 03/08/2022    On statin therapy 03/08/2022    Pain in both feet 08/10/2016    Excessive daytime sleepiness 06/21/2016    Flat feet, bilateral 06/21/2016    Depression with anxiety 10/28/2014         Plan:  Return for annual exam.  Diaz Jennings recommendations given    Isidro Braga MD

## 2022-11-21 RX ORDER — FLUTICASONE PROPIONATE 50 MCG
2 SPRAY, SUSPENSION (ML) NASAL DAILY
Qty: 48 G | Refills: 1 | Status: SHIPPED | OUTPATIENT
Start: 2022-11-21

## 2022-11-21 NOTE — TELEPHONE ENCOUNTER
Last visit: 06/30/2022  Last Med refill: 10/11/2022  Does patient have enough medication for 72 hours: No:     Next Visit Date:  Future Appointments   Date Time Provider [place]   1/11/2023  2:00 PM MD Leigh Fong [address]   3/2/2023  2:00 PM IONA Gu - CNP Neuro Spec Neurology -   8/25/2023  3:00 PM Magdalene Dangelo PA-C  [address] Maintenance   Topic Date Due    HPV vaccine (3 - 2-dose series) 09/21/2007    HIV screen  Never done    Hepatitis C screen  Never done    DTaP/Tdap/Td vaccine (7 - Td or Tdap) 09/27/2017    Hepatitis B vaccine (3 of 3 - Risk 3-dose series) 07/25/2021    COVID-19 Vaccine (4 - Booster for [place] series) 01/04/2022    Flu vaccine (1) 08/01/2022    Lipids  03/10/2023    Depression Monitoring  06/30/2023    Pap smear  08/01/2025    Hepatitis A vaccine  Completed    Hib vaccine  Completed    Varicella vaccine  Completed    Meningococcal (ACWY) vaccine  Aged Out    Pneumococcal 0-64 years Vaccine  Aged Out       No results found for: LABA1C          ( goal A1C is < 7)   No results found for: LABMICR  LDL Calculated (mg/dL)   Date Value   03/10/2022 69   06/29/2016 160       (goal LDL is <100)   AST (U/L)   Date Value   03/10/2022 44     ALT (U/L)   Date Value   03/10/2022 68     BUN (mg/dL)   Date Value   03/10/2022 11     BP Readings from Last 3 Encounters:   11/16/22 112/74   10/06/22 117/74   10/02/22 118/88          (goal 120/80)    All Future Testing planned in [facility]  Lab Frequency Next Occurrence   Thyroid Antibodies Once 03/08/2022   EGD Once 05/03/2022   EKG 12 Lead Once 05/03/2022   PAP Smear Once 05/23/2022   PAP SMEAR Once 09/01/2022               Patient Active Problem List:     Depression with anxiety     Excessive daytime sleepiness     Flat feet, bilateral     Pain in both feet     Hypothyroidism     Vitamin D deficiency     On statin therapy     Hyperlipidemia     Other fatigue     Acute diarrhea     Gastroesophageal reflux disease without esophagitis     Allergic rhinitis     Dermatitis     Congenital pes planus     Arachnoid cyst     Female-to-male transgender person     Migraines     Dyspepsia     Abnormal bowel habits     Gastritis without bleeding

## 2023-01-11 ENCOUNTER — OFFICE VISIT (OUTPATIENT)
Dept: GASTROENTEROLOGY | Age: 28
End: 2023-01-11
Payer: COMMERCIAL

## 2023-01-11 VITALS
HEIGHT: 62 IN | HEART RATE: 74 BPM | WEIGHT: 189 LBS | BODY MASS INDEX: 34.78 KG/M2 | OXYGEN SATURATION: 95 % | DIASTOLIC BLOOD PRESSURE: 74 MMHG | SYSTOLIC BLOOD PRESSURE: 108 MMHG

## 2023-01-11 DIAGNOSIS — K21.9 GASTROESOPHAGEAL REFLUX DISEASE WITHOUT ESOPHAGITIS: Primary | ICD-10-CM

## 2023-01-11 PROCEDURE — G8484 FLU IMMUNIZE NO ADMIN: HCPCS | Performed by: INTERNAL MEDICINE

## 2023-01-11 PROCEDURE — G8428 CUR MEDS NOT DOCUMENT: HCPCS | Performed by: INTERNAL MEDICINE

## 2023-01-11 PROCEDURE — G8417 CALC BMI ABV UP PARAM F/U: HCPCS | Performed by: INTERNAL MEDICINE

## 2023-01-11 PROCEDURE — 99213 OFFICE O/P EST LOW 20 MIN: CPT | Performed by: INTERNAL MEDICINE

## 2023-01-11 PROCEDURE — 1036F TOBACCO NON-USER: CPT | Performed by: INTERNAL MEDICINE

## 2023-01-11 RX ORDER — OMEPRAZOLE 40 MG/1
40 CAPSULE, DELAYED RELEASE ORAL
Qty: 90 CAPSULE | Refills: 3 | Status: SHIPPED | OUTPATIENT
Start: 2023-01-11

## 2023-01-11 ASSESSMENT — ENCOUNTER SYMPTOMS
CONSTIPATION: 0
ANAL BLEEDING: 0
ABDOMINAL DISTENTION: 0
ABDOMINAL PAIN: 0
VOMITING: 0
BLOOD IN STOOL: 0
SORE THROAT: 0
NAUSEA: 0
DIARRHEA: 0
EYES NEGATIVE: 1
TROUBLE SWALLOWING: 0
RECTAL PAIN: 0
ALLERGIC/IMMUNOLOGIC NEGATIVE: 1

## 2023-01-11 NOTE — PROGRESS NOTES
Reason for Referral: Chronic GERD and irregular bowel habits    Dairy appears to be significant because of the patient's symptoms  Responding well to Tagamet however give TID regimen, timing may be inconvenient with other medication      Chief Complaint   Patient presents with    Gastroesophageal Reflux     Requesting medication change due to interactions with other meds. HISTORY OF PRESENT ILLNESS: Whitney Tilley is a 33 y.o. adult with a past history remarkable for depression, gender dysphoria, mastectomy for cosmetic reasons, on testosterone supplementation, history of depression, chronic history of GERD and irregular bowel movements for several years, referred for evaluation of GERD and irregular bowel habits. Patient reports irregular bowel habits alternating from constipation diarrhea, diarrhea predominant, no obvious dietary triggers reported by the patient. No melena, hematochezia, bright red blood per rectum. Mild steatorrhea, does report isolated episodes of blood-tinged stools. Denies any tenesmus. Does report mild abdominal cramping at times that appeared not to be associated with bowel frequency or episodes. Upper GI symptoms of GERD I do not appear to be related to lower GI symptoms. Denies any unintentional weight loss. No recent endoscopic evaluation. Smoker: None  Drinking history: Socially  Illicit drugs: Cannibus. Abdominal surgeries: None   Prior Colonoscopy: None   Prior EGD: None   FH of GI issues: None    Jan 2022-mastectomy     Past Medical,Family, and Social History reviewed and does contribute to the patient presentingcondition. Patient's PMH/PSH,SH,PSYCH Hx, MEDs, ALLERGIES, and ROS were all reviewed and updated in the appropriate sections.     PAST MEDICAL HISTORY:  Past Medical History:   Diagnosis Date    Concussion with no loss of consciousness 4/8/2022    Depression        Past Surgical History:   Procedure Laterality Date    COLONOSCOPY 07/06/2022    COLONOSCOPY DIAGNOSTIC     COLONOSCOPY N/A 7/6/2022    COLONOSCOPY WITH BIOPSY performed by Pedro Saleem MD at [id number] [place] [address].    ESOPHAGOGASTRODUODENOSCOPY  07/06/2022     EGD BIOPSY     MASTECTOMY Bilateral 01/20/2021    UPPER GASTROINTESTINAL ENDOSCOPY N/A 7/6/2022    EGD BIOPSY performed by Pedro Saleem MD at [facility]:    Current Outpatient Medications:     fluticasone (FLONASE) 50 MCG/ACT nasal spray, 2 sprays by Each Nostril route daily, Disp: 48 g, Rfl: 1    cimetidine (TAGAMET) 800 MG tablet, TAKE 1 TABLET BY MOUTH TWICE DAILY, Disp: 30 tablet, Rfl: 3    albuterol sulfate HFA (VENTOLIN HFA) 108 (90 Base) MCG/ACT inhaler, Inhale 2 puffs into the lungs 4 times daily as needed for Wheezing, Disp: 18 g, Rfl: 0    ketoconazole (NIZORAL) 2 % shampoo, Apply 3-4 times weekly to scalp, leave on for five minutes prior to washing off, Disp: 120 mL, Rfl: 2    VITAMIN D PO, Take 1.5 mg by mouth Twice a Week, Disp: , Rfl:     triamcinolone (KENALOG) 0.1 % cream, Apply to rash twice daily (not face, armpit or groin), Disp: 80 g, Rfl: 1    fluocinonide (LIDEX) 0.05 % external solution, Apply to scalp daily for rash, Disp: 60 mL, Rfl: 2    B-D 3CC LUER-LENA SYR 16BM8-2/2 22G X 1-1/2\" 3 ML MISC, , Disp: , Rfl:     busPIRone (BUSPAR) 10 MG tablet, Patient takes 2 tablets at one time to equal 20 mg, Disp: , Rfl:     vitamin D (ERGOCALCIFEROL) 1.25 MG (23335 UT) CAPS capsule, , Disp: , Rfl:     BD DISP NEEDLE 23G X 1\" MISC, , Disp: , Rfl:     testosterone cypionate (DEPOTESTOTERONE CYPIONATE) 200 MG/ML injection, , Disp: , Rfl:     loratadine (CLARITIN) 10 MG tablet, Claritin TABS  Refills: 0  Active, Disp: , Rfl:     levothyroxine (SYNTHROID) 50 MCG tablet, Take 50 mcg by mouth Daily, Disp: , Rfl:     Ferrous Sulfate (IRON PO), Take by mouth, Disp: , Rfl:     emtricitabine-tenofovir alafenamide (DESCOVY) 200-25 MG TABS tablet, Take 1 tablet by mouth daily, Disp: , Rfl:     atorvastatin (LIPITOR) 10 MG tablet, TAKE 1 TABLET BY MOUTH ONE TIME A DAY, Disp: , Rfl: 3    vilazodone HCl (VIIBRYD) 20 MG TABS, Take 20 mg by mouth in the morning., Disp: , Rfl:     ALLERGIES:   Allergies   Allergen Reactions    Bupropion      Unknown Reaction. Adhesive Tape Dermatitis and Rash       FAMILY HISTORY:       Problem Relation Age of Onset    Cancer Mother 48        Cervical    Alcohol Abuse Mother     Substance Abuse Mother     Alcohol Abuse Father     Substance Abuse Father          SOCIAL HISTORY:   Social History     Socioeconomic History    Marital status: Single     Spouse name: Not on file    Number of children: Not on file    Years of education: Not on file    Highest education level: Not on file   Occupational History    Not on file   Tobacco Use    Smoking status: Never    Smokeless tobacco: Never   Vaping Use    Vaping Use: Never used   Substance and Sexual Activity    Alcohol use: Yes     Comment: Social use    Drug use: Yes     Types: Marijuana (Weed)     Comment: Social, maybe once a month    Sexual activity: Not Currently     Partners: Male, Female   Other Topics Concern    Not on file   Social History Narrative    Not on file     Social Determinants of Health     Financial Resource Strain: Low Risk     Difficulty of Paying Living Expenses: Not hard at all   Food Insecurity: No Food Insecurity    Worried About Running Out of Food in the Last Year: Never true    Ran Out of Food in the Last Year: Never true   Transportation Needs: Not on file   Physical Activity: Not on file   Stress: Not on file   Social Connections: Not on file   Intimate Partner Violence: Not on file   Housing Stability: Not on file         REVIEW OF SYSTEMS: A 12-point review of systems was obtained and pertinent positives and negatives were listed below. REVIEW OF SYSTEMS:     Constitutional: No fever, no chills, no lethargy, no weakness.   HEENT:  No headache, otalgia, itchy eyes, nasal discharge or sore throat. Cardiac:  No chest pain, dyspnea, orthopnea or PND. Chest:   No cough, phlegm or wheezing. Abdomen:      Detailed by MA   Neuro:  No focal weakness, abnormal movements or seizure like activity. Skin:   No rashes, no itching. :   No hematuria, no pyuria, no dysuria, no flank pain. Extremities:  No swelling or joint pains. ROS was otherwise negative    Review of Systems   Constitutional:  Positive for fatigue. HENT: Negative. Negative for sore throat and trouble swallowing. Eyes: Negative. Gastrointestinal:  Negative for abdominal distention, abdominal pain, anal bleeding, blood in stool, constipation, diarrhea, nausea, rectal pain and vomiting. Endocrine: Negative. Genitourinary: Negative. Skin: Negative. Allergic/Immunologic: Negative. Neurological:  Positive for dizziness, light-headedness and headaches. PHYSICAL EXAMINATION: Vital signs reviewed per the nursing documentation. There were no vitals taken for this visit. There is no height or weight on file to calculate BMI. Physical Exam    Physical Exam   Constitutional: Patient is oriented to person, place, and time. Patient appears well-developed and well-nourished. HENT:   Head: Normocephalic and atraumatic. Eyes: Pupils are equal, round, and reactive to light. EOM are normal.   Neck: Normal range of motion. Neck supple. No JVD present. No tracheal deviation present. No thyromegaly present. Cardiovascular: Normal rate, regular rhythm, normal heart sounds and intact distal pulses. Pulmonary/Chest: Effort normal and breath sounds normal. No stridor. No respiratory distress. He has no wheezes. He has no rales. He exhibits no tenderness. Abdominal: Soft. Bowel sounds are normal. He exhibits no distension and no mass. There is no tenderness. There is no rebound and no guarding. No hernia. Musculoskeletal: Normal range of motion.    Lymphadenopathy:    Patient has no cervical adenopathy. Neurological: Patient is alert and oriented to person, place, and time. Psychiatric: Patient has a normal mood and affect. Patient behavior is normal.       LABORATORY DATA: Reviewed  Lab Results   Component Value Date    WBC 7.66 03/10/2022    HGB 15.6 03/10/2022    HCT 45.9 03/10/2022    MCV 95.0 03/10/2022     03/10/2022     03/10/2022    K 4.9 03/10/2022     03/10/2022    CO2 28 03/10/2022    BUN 11 03/10/2022    CREATININE 1.05 03/10/2022    LABPROT 6.9 02/07/2013    LABALBU 4.7 03/10/2022    BILITOT 0.5 03/10/2022    ALKPHOS 77 03/10/2022    AST 44 03/10/2022    ALT 68 03/10/2022         Lab Results   Component Value Date    RBC 4.83 03/10/2022    HGB 15.6 03/10/2022    MCV 95.0 03/10/2022    MCH 32.3 03/10/2022    MCHC 34.0 03/10/2022    RDW 43.0 03/10/2022    MPV 8.1 02/07/2013    BASOPCT 0.9 03/10/2022    LYMPHSABS 2.71 03/10/2022    MONOSABS 0.63 03/10/2022    NEUTROABS 4.06 03/10/2022    EOSABS 0.17 03/10/2022    BASOSABS 0.07 03/10/2022         DIAGNOSTIC TESTING:     CT Head WO Contrast    Result Date: 4/2/2022  EXAMINATION: CT OF THE HEAD WITHOUT CONTRAST  4/2/2022 3:24 pm TECHNIQUE: CT of the head was performed without the administration of intravenous contrast. Dose modulation, iterative reconstruction, and/or weight based adjustment of the mA/kV was utilized to reduce the radiation dose to as low as reasonably achievable. COMPARISON: None. HISTORY: ORDERING SYSTEM PROVIDED HISTORY: fall TECHNOLOGIST PROVIDED HISTORY: fall Decision Support Exception - unselect if not a suspected or confirmed emergency medical condition->Emergency Medical Condition (MA) Reason for Exam: Fall, hit head, some trouble with short term memory loss FINDINGS: BRAIN/VENTRICLES: There is no acute intracranial hemorrhage, mass effect or midline shift. Hydrocephalus. The patient has an arachnoid cyst in the posterior fossa 3.5 x 1.2 x 2.5 cm.   No other remarkable extra-axial fluid collections are noted. The gray-white differentiation is maintained without evidence of an acute infarct. Cortical atrophy, mild, but remarkable for age is noted. ORBITS: The visualized portion of the orbits demonstrate no acute abnormality. SINUSES: The visualized paranasal sinuses and mastoid air cells demonstrate no acute abnormality. SOFT TISSUES/SKULL:  No acute abnormality of the visualized skull or soft tissues. No acute intracranial abnormality. Midline posterior fossa extra-axial fluid collection appearance favoring arachnoid cyst.  Minimal-to-mild cortical atrophy, prominent for age. IMPRESSION: Jessica Doan is a 23 y.o. adult with a past history remarkable for depression, gender dysphoria, mastectomy for cosmetic reasons, on testosterone supplementation, history of depression, chronic history of GERD and irregular bowel movements for several years, referred for evaluation of GERD and irregular bowel habits. Patient reports irregular bowel habits alternating from constipation diarrhea, diarrhea predominant, no obvious dietary triggers reported by the patient. No melena, hematochezia, bright red blood per rectum. Mild steatorrhea, does report isolated episodes of blood-tinged stools. Denies any tenesmus. Does report mild abdominal cramping at times that appeared not to be associated with bowel frequency or episodes. Upper GI symptoms of GERD I do not appear to be related to lower GI symptoms. Denies any unintentional weight loss. No recent endoscopic evaluation. Assessment  No diagnosis found. Maximus Reno was seen today for new patient and gastroesophageal reflux. Diagnoses and all orders for this visit:    Diarrhea, unspecified type-likely related to dairy intolerance, patient avoiding dairy during the time I see no significant provement. Significant response to the patient dietary changes. EGD and colonoscopy was negative.   Biopsies were negative for any cause of the patient's symptoms. No evidence of microscopic colitis. Gastroesophageal reflux disease without esophagitis-Will provide trial of prilosec 40mg instead of tagamet, patient to return to previous medication if there is suboptimal clinical response. RTC: 3 months. Additional comments:      Lactose intolerant  Sensitivities. Thank you for allowing me to participate in the care of Mr. Dulce Barragan. For any further questions please do not hesitate to contact me. I have reviewed and agree with the MA/LPN ROS please refer to their documentation from today's encounter on a separate note. Estela Pulido MD, MPH   Board Certified in Gastroenterology  Board Certified in [address] [place] [address] #: [phone]          this note is created with the assistance of a speech recognition program.  While intending to generate a document that actually reflects the content of the visit, the document can still have some errors including those of syntax and sound a like substitutions which may escape proof reading. It such instances, actual meaning can be extrapolated by contextual diversion.

## 2023-02-10 ENCOUNTER — OFFICE VISIT (OUTPATIENT)
Dept: FAMILY MEDICINE CLINIC | Age: 28
End: 2023-02-10
Payer: COMMERCIAL

## 2023-02-10 VITALS
SYSTOLIC BLOOD PRESSURE: 110 MMHG | HEART RATE: 80 BPM | BODY MASS INDEX: 35.15 KG/M2 | OXYGEN SATURATION: 95 % | RESPIRATION RATE: 22 BRPM | DIASTOLIC BLOOD PRESSURE: 78 MMHG | HEIGHT: 62 IN | WEIGHT: 191 LBS

## 2023-02-10 DIAGNOSIS — Z00.00 ANNUAL VISIT FOR GENERAL ADULT MEDICAL EXAMINATION WITHOUT ABNORMAL FINDINGS: Primary | ICD-10-CM

## 2023-02-10 PROCEDURE — 99395 PREV VISIT EST AGE 18-39: CPT | Performed by: NURSE PRACTITIONER

## 2023-02-10 PROCEDURE — G8484 FLU IMMUNIZE NO ADMIN: HCPCS | Performed by: NURSE PRACTITIONER

## 2023-02-10 SDOH — ECONOMIC STABILITY: HOUSING INSECURITY
IN THE LAST 12 MONTHS, WAS THERE A TIME WHEN YOU DID NOT HAVE A STEADY PLACE TO SLEEP OR SLEPT IN A SHELTER (INCLUDING NOW)?: NO

## 2023-02-10 SDOH — ECONOMIC STABILITY: FOOD INSECURITY: WITHIN THE PAST 12 MONTHS, YOU WORRIED THAT YOUR FOOD WOULD RUN OUT BEFORE YOU GOT MONEY TO BUY MORE.: NEVER TRUE

## 2023-02-10 SDOH — ECONOMIC STABILITY: INCOME INSECURITY: HOW HARD IS IT FOR YOU TO PAY FOR THE VERY BASICS LIKE FOOD, HOUSING, MEDICAL CARE, AND HEATING?: NOT HARD AT ALL

## 2023-02-10 SDOH — ECONOMIC STABILITY: FOOD INSECURITY: WITHIN THE PAST 12 MONTHS, THE FOOD YOU BOUGHT JUST DIDN'T LAST AND YOU DIDN'T HAVE MONEY TO GET MORE.: NEVER TRUE

## 2023-02-10 ASSESSMENT — ENCOUNTER SYMPTOMS
DIARRHEA: 0
SINUS PRESSURE: 0
NAUSEA: 0
BACK PAIN: 0
CONSTIPATION: 0
EYE PAIN: 0
ABDOMINAL PAIN: 0
SINUS PAIN: 0
CHEST TIGHTNESS: 0
COLOR CHANGE: 0
SHORTNESS OF BREATH: 0
VOMITING: 0

## 2023-02-10 ASSESSMENT — PATIENT HEALTH QUESTIONNAIRE - PHQ9
9. THOUGHTS THAT YOU WOULD BE BETTER OFF DEAD, OR OF HURTING YOURSELF: 0
3. TROUBLE FALLING OR STAYING ASLEEP: 3
SUM OF ALL RESPONSES TO PHQ QUESTIONS 1-9: 7
10. IF YOU CHECKED OFF ANY PROBLEMS, HOW DIFFICULT HAVE THESE PROBLEMS MADE IT FOR YOU TO DO YOUR WORK, TAKE CARE OF THINGS AT HOME, OR GET ALONG WITH OTHER PEOPLE: 0
2. FEELING DOWN, DEPRESSED OR HOPELESS: 0
SUM OF ALL RESPONSES TO PHQ QUESTIONS 1-9: 7
SUM OF ALL RESPONSES TO PHQ QUESTIONS 1-9: 7
6. FEELING BAD ABOUT YOURSELF - OR THAT YOU ARE A FAILURE OR HAVE LET YOURSELF OR YOUR FAMILY DOWN: 0
1. LITTLE INTEREST OR PLEASURE IN DOING THINGS: 0
SUM OF ALL RESPONSES TO PHQ9 QUESTIONS 1 & 2: 0
5. POOR APPETITE OR OVEREATING: 0
SUM OF ALL RESPONSES TO PHQ QUESTIONS 1-9: 7
4. FEELING TIRED OR HAVING LITTLE ENERGY: 3
7. TROUBLE CONCENTRATING ON THINGS, SUCH AS READING THE NEWSPAPER OR WATCHING TELEVISION: 1
8. MOVING OR SPEAKING SO SLOWLY THAT OTHER PEOPLE COULD HAVE NOTICED. OR THE OPPOSITE, BEING SO FIGETY OR RESTLESS THAT YOU HAVE BEEN MOVING AROUND A LOT MORE THAN USUAL: 0

## 2023-02-10 NOTE — PROGRESS NOTES
Ryan Baez (:  1995) is a 33 y.o. adult,Established patient, here for evaluation of the following chief complaint(s): Annual Exam and Health Maintenance (Depression screen completed. /)         ASSESSMENT/PLAN:  1. Annual visit for general adult medical examination without abnormal findings  Labs reviewed in care everywhere  Records release signed today to obtain recent labs completed through endo and "[employer] [address]  Work physical form completed, scanned into media     Return in about 6 months (around 8/10/2023). 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 Sandra Cantu  GI: Dr. Garett England: Dr. Lenin Barriga for [facility], gets HIV/Hep C screens routinely     Denies any other problems/concerns at this time       Review of Systems   Constitutional:  Negative for activity change, chills, fatigue and unexpected weight change. HENT:  Negative for hearing loss, postnasal drip, sinus pressure and sinus pain. Eyes:  Negative for pain and visual disturbance. Respiratory:  Negative for chest tightness and shortness of breath. Cardiovascular:  Negative for chest pain and palpitations. Gastrointestinal:  Negative for abdominal pain, constipation, diarrhea, nausea and vomiting.    Endocrine: Negative for polydipsia, polyphagia and polyuria. Genitourinary:  Negative for dysuria, flank pain, frequency and urgency. Musculoskeletal:  Negative for arthralgias, back pain and myalgias. Skin:  Negative for color change and rash. Neurological:  Positive for headaches. Negative for dizziness, weakness, light-headedness and numbness. Psychiatric/Behavioral:  Negative for confusion, hallucinations, self-injury, sleep disturbance and suicidal ideas. The patient is not nervous/anxious. Objective   Physical Exam  Vitals and nursing note reviewed. Constitutional:       Appearance: Normal appearance. He is obese. HENT:      Head: Normocephalic. Right Ear: Hearing, tympanic membrane, ear canal and external ear normal.      Left Ear: Hearing, tympanic membrane, ear canal and external ear normal.      Nose: Nose normal.      Mouth/Throat:      Mouth: Mucous membranes are moist.      Pharynx: Oropharynx is clear. Uvula midline. No posterior oropharyngeal erythema. Eyes:      Extraocular Movements: Extraocular movements intact. Conjunctiva/sclera: Conjunctivae normal.      Pupils: Pupils are equal, round, and reactive to light. Cardiovascular:      Rate and Rhythm: Normal rate and regular rhythm. Pulses: Normal pulses. Heart sounds: Normal heart sounds, S1 normal and S2 normal.   Pulmonary:      Effort: Pulmonary effort is normal.      Breath sounds: Normal breath sounds. Abdominal:      General: Abdomen is flat. Bowel sounds are normal.      Palpations: Abdomen is soft. Musculoskeletal:         General: Normal range of motion. Cervical back: Normal range of motion. Skin:     General: Skin is warm and dry. Capillary Refill: Capillary refill takes less than 2 seconds. Neurological:      General: No focal deficit present. Mental Status: He is alert and oriented to person, place, and time. Mental status is at baseline.    Psychiatric:         Mood and Affect: Mood normal. Behavior: Behavior normal.         Thought Content: Thought content normal.         Judgment: Judgment normal.          Wt Readings from Last 3 Encounters:   02/10/23 191 lb (86.6 kg)   01/11/23 189 lb (85.7 kg)   11/16/22 193 lb (87.5 kg)     Temp Readings from Last 3 Encounters:   10/02/22 98.6 °F (37 °C)   09/27/22 98.1 °F (36.7 °C)   08/25/22 97.7 °F (36.5 °C) (Temporal)     BP Readings from Last 3 Encounters:   02/10/23 110/78   01/11/23 108/74   11/16/22 112/74     Pulse Readings from Last 3 Encounters:   02/10/23 80   01/11/23 74   11/16/22 68         An electronic signature was used to authenticate this note.     --IONA Lawson - NP

## 2023-03-02 ENCOUNTER — TELEMEDICINE (OUTPATIENT)
Dept: NEUROLOGY | Age: 28
End: 2023-03-02
Payer: COMMERCIAL

## 2023-03-02 ENCOUNTER — TELEPHONE (OUTPATIENT)
Dept: NEUROLOGY | Age: 28
End: 2023-03-02

## 2023-03-02 DIAGNOSIS — G47.19 EXCESSIVE DAYTIME SLEEPINESS: Primary | ICD-10-CM

## 2023-03-02 DIAGNOSIS — G93.0 ARACHNOID CYST: ICD-10-CM

## 2023-03-02 PROCEDURE — 99214 OFFICE O/P EST MOD 30 MIN: CPT | Performed by: NURSE PRACTITIONER

## 2023-03-02 PROCEDURE — 95805 MULTIPLE SLEEP LATENCY TEST: CPT | Performed by: NURSE PRACTITIONER

## 2023-03-02 PROCEDURE — G8484 FLU IMMUNIZE NO ADMIN: HCPCS | Performed by: NURSE PRACTITIONER

## 2023-03-02 PROCEDURE — G8417 CALC BMI ABV UP PARAM F/U: HCPCS | Performed by: NURSE PRACTITIONER

## 2023-03-02 PROCEDURE — 1036F TOBACCO NON-USER: CPT | Performed by: NURSE PRACTITIONER

## 2023-03-02 PROCEDURE — G8427 DOCREV CUR MEDS BY ELIG CLIN: HCPCS | Performed by: NURSE PRACTITIONER

## 2023-03-02 NOTE — PROGRESS NOTES
[facility]  [address], [place], [address] [facility]  Phone: [phone]  Fax: [phone]    MD Camilo Tony MD Merrill Nim, MD Loye Bucker, MILAGROS    TELEHEALTH VISIT        3/2/2023      HISTORY OF PRESENT ILLNESS:       I had the pleasure of seeing Jossie Carter, who returns via Telehealth for continued neurologic care. The patient was seen last on July 21, 2022 for treatment of postconcussion syndrome and memory difficulties. The patient was previously seen by Dr. Bronson Tran and all of the records and diagnostic studies were carefully reviewed. The patient has postconcussion syndrome secondary to a fall in April 2022. MRI of the brain in August 2022 was unremarkable however did note an incidental small posterior fossa arachnoid cyst. He has noticed resolution of his postconcussion symptoms at today's visit. He also has memory difficulties which are worsened by fatigue and anxiety. MOCA completed at his last visit with score of 27/30. He is requesting a repeat baseline diagnostic sleep study at today's visit. The patient snores when sleeping, has reported episodes of apnea, tosses and turns when sleeping, frequently awakes when sleeping, denies headaches upon awaking however has impaired memory/concentration due to his sleep difficulties. Testing reviewed:    MRI Brain W WO Contrast 8/8/2022  Impression   Unremarkable exam     CT Head WO Contrast 4/4/2022  Impression   No acute intracranial abnormality. Midline posterior fossa extra-axial fluid   collection appearance favoring arachnoid cyst.  Minimal-to-mild cortical   atrophy, prominent for age.              PAST MEDICAL HISTORY:         Diagnosis Date    Concussion with no loss of consciousness 4/8/2022    Depression         PAST SURGICAL HISTORY:         Procedure Laterality Date    COLONOSCOPY  07/06/2022    COLONOSCOPY DIAGNOSTIC     COLONOSCOPY N/A 7/6/2022 COLONOSCOPY WITH BIOPSY performed by Lanette Nesbitt MD at [address] [facility].    ESOPHAGOGASTRODUODENOSCOPY  07/06/2022     EGD BIOPSY     MASTECTOMY Bilateral 01/20/2021    UPPER GASTROINTESTINAL ENDOSCOPY N/A 7/6/2022    EGD BIOPSY performed by Lanette Nesbitt MD at [address]:     Social History     Socioeconomic History    Marital status: Single     Spouse name: Not on file    Number of children: Not on file    Years of education: Not on file    Highest education level: Not on file   Occupational History    Not on file   Tobacco Use    Smoking status: Never    Smokeless tobacco: Never   Vaping Use    Vaping Use: Never used   Substance and Sexual Activity    Alcohol use: Yes     Comment: Social use    Drug use: Yes     Types: Marijuana Frank Saris)     Comment: Social, maybe once a month    Sexual activity: Not Currently     Partners: Male, Female   Other Topics Concern    Not on file   Social History Narrative    Not on file     Social Determinants of Health     Financial Resource Strain: Low Risk     Difficulty of Paying Living Expenses: Not hard at all   Food Insecurity: No Food Insecurity    Worried About [address] in the Last Year: Never true    [address] [address] in the Last Year: Never true   Transportation Needs: Unknown    Lack of Transportation (Medical): Not on file    Lack of Transportation (Non-Medical):  No   Physical Activity: Not on file   Stress: Not on file   Social Connections: Not on file   Intimate Partner Violence: Not on file   Housing Stability: Unknown    Unable to Pay for Housing in the Last Year: Not on file    Number of Places Lived in the Last Year: Not on file    Unstable Housing in the Last Year: No       CURRENT MEDICATIONS:     Current Outpatient Medications   Medication Sig Dispense Refill    omeprazole (PRILOSEC) 40 MG delayed release capsule Take 1 capsule by mouth every morning (before breakfast) 90 capsule 3 fluticasone (FLONASE) 50 MCG/ACT nasal spray 2 sprays by Each Nostril route daily 48 g 1    ketoconazole (NIZORAL) 2 % shampoo Apply 3-4 times weekly to scalp, leave on for five minutes prior to washing off 120 mL 2    VITAMIN D PO Take 1.5 mg by mouth Twice a Week      triamcinolone (KENALOG) 0.1 % cream Apply to rash twice daily (not face, armpit or groin) 80 g 1    fluocinonide (LIDEX) 0.05 % external solution Apply to scalp daily for rash 60 mL 2    B-D 3CC LUER-LENA SYR 81XN6-5/2 22G X 1-1/2\" 3 ML MISC       busPIRone (BUSPAR) 10 MG tablet Patient takes 2 tablets at one time to equal 20 mg      vitamin D (ERGOCALCIFEROL) 1.25 MG (76468 UT) CAPS capsule       BD DISP NEEDLE 23G X 1\" MISC       testosterone cypionate (DEPOTESTOTERONE CYPIONATE) 200 MG/ML injection       loratadine (CLARITIN) 10 MG tablet Claritin TABS   Refills: 0    Active      levothyroxine (SYNTHROID) 50 MCG tablet Take 50 mcg by mouth Daily      Ferrous Sulfate (IRON PO) Take by mouth      emtricitabine-tenofovir alafenamide (DESCOVY) 200-25 MG TABS tablet Take 1 tablet by mouth daily      atorvastatin (LIPITOR) 10 MG tablet TAKE 1 TABLET BY MOUTH ONE TIME A DAY  3    vilazodone HCl (VIIBRYD) 20 MG TABS Take 20 mg by mouth in the morning. Current Facility-Administered Medications   Medication Dose Route Frequency Provider Last Rate Last Admin    etonogestrel (NEXPLANON) implant 68 mg  68 mg Subdermal Continuous Ana Bentley MD            ALLERGIES:     Allergies   Allergen Reactions    Bupropion      Unknown Reaction. Adhesive Tape Dermatitis and Rash                             REVIEW OF SYSTEMS                   All items selected indicate a positive finding. Those items not selected are negative.   Constitutional [] Weight loss/gain   [x] Fatigue  [] Fever/Chills   HEENT [] Hearing Loss  [] Visual Disturbance  [] Tinnitus  [] Eye pain   Respiratory [] Shortness of Breath  [] Cough  [] Snoring   Cardiovascular [] Chest Pain  [] Palpitations  [] Lightheaded   GI [] Constipation  [] Diarrhea  [] Swallowing change  [] Nausea/vomiting    [] Urinary Frequency  [] Urinary Urgency   Musculoskeletal [] Neck pain  [] Back pain  [] Muscle pain  [] Restless legs   Dermatologic [] Skin changes   Neurologic [] Memory loss/confusion  [] Seizures  [] Trouble walking or imbalance  [] Dizziness  [x] Sleep disturbance  [] Weakness  [] Numbness  [] Tremors  [] Speech Difficulty  [] Headaches  [] Light Sensitivity  [] Sound Sensitivity   Endocrinology []Excessive thirst  []Excessive hunger   Psychiatric [] Anxiety/Depression  [] Hallucination   Allergy/immunology []Hives/environmental allergies   Hematologic/lymph [] Abnormal bleeding  [] Abnormal bruising          PHYSICAL EXAMINATION:                                         .                                                                                                    General Appearance:  Alert, cooperative, no signs of distress, appears stated age   Head:  Normocephalic, no signs of trauma   Eyes:  Conjunctiva/corneas clear;  eyelids intact   Ears:  Normal external ear and canals   Nose: Nares normal, no drainage    Throat: Lips and tongue normal; teeth normal;  gums normal   Extremities: Extremities normal, no cyanosis, no edema   Skin: Skin color, texture normal, no rashes, no lesions                                     NEUROLOGIC EXAMINATION      Mental status    Alert and oriented x 3; able to follow commands, speech and language intact; no hallucinations or delusions  Fund of information appropriate for level of education    Cranial nerves    II - grossly intact  III, IV, VI - extra-ocular muscles full: no nystagmus, no ptosis   V - normal facial sensation                                                               VII - normal facial symmetry                                                             VIII - intact hearing IX, X - symmetrical palate                                                                  XI - symmetrical shoulder shrug                                                       XII - tongue midline without atrophy      Motor function  Normal muscle bulk. Strength at least 5/5 on all 4 extremities, no pronator drift      Sensory function Unable to test      Cerebellar Intact fine motor movement. No involuntary movements or tremors. No ataxia or dysmetria on finger to nose or heel to shin testing      Reflex function Unable to test      Gait                   normal base and arm swing              ASSESSMENT AND PLAN:     This is a telehealth visit that was performed with the originating site at Patient Location: home and Provider Location of [place], [place]. Verbal consent to participate in video visit was obtained. Pursuant to the emergency declaration under the [facility], [facility] waiver authority and the [employer] Act, this Virtual Visit was conducted, with patient's consent, to reduce the patient's risk of exposure to COVID-19 and provide continuity of care for an established/new patient. Services were provided through a video synchronous discussion virtually to substitute for in-person clinic visit. I discussed with the patient the nature of our telehealth visits via interactive/real-time audio/video that:  - I would evaluate the patient and recommend diagnostics and treatments based on my assessment  - Our sessions are not being recorded and that personal health information is protected  - Our team would provide follow up care in person if/when the patient needs it.       In summary, your patient, Awa Antonio exhibits the following, with associated plan:    Postconcussion syndrome secondary to a fall in April 2022, resolved   MRI of the brain in August 2022 was unremarkable, images were shown and reviewed with the patient at today's visit   Memory difficulties secondary to fatigue and anxiety, MOCA completed at his last visit with score of 27/30. He continues to experience excessive daytime sleepiness. Previous vitamin B12 and TSH were normal  Repeat baseline diagnostic sleep study and MSLT ordered at today's visit   Small posterior fossa arachnoid cyst which was found incidentally on MRI of the brain in August 2022   Return for follow up visit in 3 months with Dr. Jennifer Mcgregor       Signed: Manuel Goss, [address]    Please note that this chart was generated using voice recognition Dragon dictation software. Although every effort was made to ensure the accuracy of this automated transcription, some errors in transcription may have occurred. Provider Attestation: The documentation recorded by the scribe accurately reflects the service I personally performed and the decisions made by myself. Portions of this note were transcribed by a scribe. I personally performed the history, physical exam, and medical decision-making and confirm the accuracy of the information in the transcribed note. Scribe Attestation:   By signing my name below, Harpreet Man, attest that this documentation has been prepared under the direction and in the presence of Manuel Goss CNP.

## 2023-03-30 DIAGNOSIS — L21.9 SEBORRHEIC DERMATITIS: ICD-10-CM

## 2023-04-03 ENCOUNTER — HOSPITAL ENCOUNTER (OUTPATIENT)
Dept: SLEEP CENTER | Age: 28
Discharge: HOME | End: 2023-04-05
Payer: COMMERCIAL

## 2023-04-03 VITALS — HEIGHT: 62 IN | BODY MASS INDEX: 35.15 KG/M2 | WEIGHT: 191 LBS

## 2023-04-03 DIAGNOSIS — G47.19 EXCESSIVE DAYTIME SLEEPINESS: ICD-10-CM

## 2023-04-03 PROCEDURE — 95810 POLYSOM 6/> YRS 4/> PARAM: CPT

## 2023-04-03 RX ORDER — KETOCONAZOLE 20 MG/ML
SHAMPOO TOPICAL
Qty: 120 ML | Refills: 2 | Status: SHIPPED | OUTPATIENT
Start: 2023-04-03

## 2023-04-04 ENCOUNTER — HOSPITAL ENCOUNTER (OUTPATIENT)
Dept: SLEEP CENTER | Age: 28
Discharge: HOME | End: 2023-04-06
Payer: COMMERCIAL

## 2023-04-04 DIAGNOSIS — G47.19 EXCESSIVE DAYTIME SLEEPINESS: ICD-10-CM

## 2023-04-04 PROCEDURE — 95805 MULTIPLE SLEEP LATENCY TEST: CPT

## 2023-04-24 LAB
STATUS: NORMAL
STATUS: NORMAL

## 2023-04-28 RX ORDER — FLUTICASONE PROPIONATE 50 MCG
SPRAY, SUSPENSION (ML) NASAL
Qty: 48 G | Refills: 1 | Status: SHIPPED | OUTPATIENT
Start: 2023-04-28

## 2023-04-28 NOTE — TELEPHONE ENCOUNTER
Last visit: 02/08/2023  Last Med refill: 04/28/2023  Does patient have enough medication for 72 hours: Yes    Next Visit Date:  Future Appointments   Date Time Provider [place]   5/11/2023  2:30 PM MD Nain Diezjosselyn [address]   6/5/2023  2:40 PM Smiley Cespedes MD Neuro Spec Neurology -   7/11/2023  1:15 PM Ammy Mackay MD [facility]   8/10/2023  4:30 [facility]   8/25/2023  3:00 PM Aimee Burroughs PA-C  [address] Maintenance   Topic Date Due    Varicella vaccine (1 of 2 - 2-dose childhood series) Never done    HPV vaccine (1 - 2-dose series) Never done    Hepatitis C screen  Never done    DTaP/Tdap/Td vaccine (1 - Tdap) Never done    Hepatitis B vaccine (3 of 3 - Risk 3-dose series) 07/25/2021    COVID-19 Vaccine (4 - Booster for [place] series) 01/04/2022    Lipids  03/10/2023    Flu vaccine (Season Ended) 08/01/2023    Depression Monitoring  02/10/2024    Pap smear  08/01/2025    Hepatitis A vaccine  Completed    HIV screen  Completed    Hib vaccine  Aged Out    Meningococcal (ACWY) vaccine  Aged Out    Pneumococcal 0-64 years Vaccine  Aged Out       No results found for: LABA1C          ( goal A1C is < 7)   No results found for: LABMICR  LDL Calculated (mg/dL)   Date Value   03/10/2022 69   06/29/2016 160       (goal LDL is <100)   AST (U/L)   Date Value   03/10/2022 44     ALT (U/L)   Date Value   03/10/2022 68     BUN (mg/dL)   Date Value   03/10/2022 11     BP Readings from Last 3 Encounters:   02/10/23 110/78   01/11/23 108/74   11/16/22 112/74          (goal 120/80)    All Future Testing planned in [facility]  Lab Frequency Next Occurrence   EKG 12 Lead Once 05/03/2022   PAP Smear Once 05/23/2022   PAP SMEAR Once 09/01/2022               Patient Active Problem List:     Depression with anxiety     Excessive daytime sleepiness     Flat feet, bilateral     Pain in both feet     Hypothyroidism     Vitamin D deficiency     On statin

## 2023-05-02 ENCOUNTER — TELEPHONE (OUTPATIENT)
Dept: OBGYN CLINIC | Age: 28
End: 2023-05-02

## 2023-05-02 NOTE — TELEPHONE ENCOUNTER
Patient called stating that he got a bill from the lab from his repeat papsmear on  DOS 8/1/22. Acct # on bill: [de-identified]    Pt should not be receiving a bill because his first came back with insufficient cells. Writer called lab and informed them that pt should not be receiving bill. Spoke to Merly Alaniz who stated that they will take care of everything and to disregard any future statements. Called patient and informed him of the above. He is going to call the lab in about a month to make sure everything was taken care of. Encouraged him to call the office back if there are continued issues with this bill.

## 2023-05-11 ENCOUNTER — OFFICE VISIT (OUTPATIENT)
Dept: GASTROENTEROLOGY | Age: 28
End: 2023-05-11
Payer: COMMERCIAL

## 2023-05-11 VITALS
BODY MASS INDEX: 36.62 KG/M2 | RESPIRATION RATE: 16 BRPM | HEIGHT: 62 IN | WEIGHT: 199 LBS | DIASTOLIC BLOOD PRESSURE: 79 MMHG | HEART RATE: 82 BPM | OXYGEN SATURATION: 100 % | SYSTOLIC BLOOD PRESSURE: 114 MMHG

## 2023-05-11 DIAGNOSIS — K21.9 GASTROESOPHAGEAL REFLUX DISEASE WITHOUT ESOPHAGITIS: ICD-10-CM

## 2023-05-11 DIAGNOSIS — K59.00 CONSTIPATION, UNSPECIFIED CONSTIPATION TYPE: Primary | ICD-10-CM

## 2023-05-11 PROCEDURE — G8427 DOCREV CUR MEDS BY ELIG CLIN: HCPCS | Performed by: INTERNAL MEDICINE

## 2023-05-11 PROCEDURE — G8417 CALC BMI ABV UP PARAM F/U: HCPCS | Performed by: INTERNAL MEDICINE

## 2023-05-11 PROCEDURE — 99213 OFFICE O/P EST LOW 20 MIN: CPT | Performed by: INTERNAL MEDICINE

## 2023-05-11 PROCEDURE — 1036F TOBACCO NON-USER: CPT | Performed by: INTERNAL MEDICINE

## 2023-05-11 RX ORDER — HYDROCORTISONE 25 MG/G
CREAM TOPICAL
Qty: 28 G | Refills: 1 | Status: SHIPPED | OUTPATIENT
Start: 2023-05-11

## 2023-05-11 ASSESSMENT — ENCOUNTER SYMPTOMS
VOMITING: 0
CONSTIPATION: 1
DIARRHEA: 0
BACK PAIN: 0
NAUSEA: 0
ABDOMINAL DISTENTION: 0
ABDOMINAL PAIN: 0
SHORTNESS OF BREATH: 0
WHEEZING: 0
BLOOD IN STOOL: 0
CHEST TIGHTNESS: 0
COUGH: 0

## 2023-05-11 NOTE — PROGRESS NOTES
Procedure Laterality Date    COLONOSCOPY  07/06/2022    COLONOSCOPY DIAGNOSTIC     COLONOSCOPY N/A 7/6/2022    COLONOSCOPY WITH BIOPSY performed by Norm Coates MD at [address] [place] [address].    ESOPHAGOGASTRODUODENOSCOPY  07/06/2022     EGD BIOPSY     MASTECTOMY Bilateral 01/20/2021    UPPER GASTROINTESTINAL ENDOSCOPY N/A 7/6/2022    EGD BIOPSY performed by Norm Coates MD at [facility]:    Current Outpatient Medications:     hydrocortisone (ANUSOL-HC) 2.5 % CREA rectal cream, Prn hemorrhoid symptoms x 7 nights., Disp: 28 g, Rfl: 1    fluticasone (FLONASE) 50 MCG/ACT nasal spray, INSTILL 2 SPRAYS in each nostril ONCE DAILY, Disp: 48 g, Rfl: 1    ketoconazole (NIZORAL) 2 % shampoo, Apply 3-4 times weekly to scalp, leave on for five minutes prior to washing off, Disp: 120 mL, Rfl: 2    omeprazole (PRILOSEC) 40 MG delayed release capsule, Take 1 capsule by mouth every morning (before breakfast), Disp: 90 capsule, Rfl: 3    VITAMIN D PO, Take 1.5 mg by mouth Twice a Week, Disp: , Rfl:     triamcinolone (KENALOG) 0.1 % cream, Apply to rash twice daily (not face, armpit or groin), Disp: 80 g, Rfl: 1    fluocinonide (LIDEX) 0.05 % external solution, Apply to scalp daily for rash, Disp: 60 mL, Rfl: 2    B-D 3CC LUER-LENA SYR 21WF3-2/2 22G X 1-1/2\" 3 ML MISC, , Disp: , Rfl:     busPIRone (BUSPAR) 10 MG tablet, Patient takes 2 tablets at one time to equal 20 mg, Disp: , Rfl:     vitamin D (ERGOCALCIFEROL) 1.25 MG (34831 UT) CAPS capsule, , Disp: , Rfl:     BD DISP NEEDLE 23G X 1\" MISC, , Disp: , Rfl:     testosterone cypionate (DEPOTESTOTERONE CYPIONATE) 200 MG/ML injection, , Disp: , Rfl:     loratadine (CLARITIN) 10 MG tablet, Claritin TABS  Refills: 0  Active, Disp: , Rfl:     levothyroxine (SYNTHROID) 50 MCG tablet, Take 50 mcg by mouth Daily, Disp: , Rfl:     Ferrous Sulfate (IRON PO), Take by mouth, Disp: , Rfl:     emtricitabine-tenofovir alafenamide

## 2023-07-11 ENCOUNTER — HOSPITAL ENCOUNTER (OUTPATIENT)
Age: 28
Setting detail: SPECIMEN
Discharge: HOME | End: 2023-07-11

## 2023-07-11 ENCOUNTER — OFFICE VISIT (OUTPATIENT)
Dept: OBGYN CLINIC | Age: 28
End: 2023-07-11
Payer: COMMERCIAL

## 2023-07-11 VITALS
HEIGHT: 62 IN | SYSTOLIC BLOOD PRESSURE: 125 MMHG | BODY MASS INDEX: 36.07 KG/M2 | HEART RATE: 79 BPM | WEIGHT: 196 LBS | DIASTOLIC BLOOD PRESSURE: 80 MMHG

## 2023-07-11 DIAGNOSIS — Z01.419 ENCOUNTER FOR WELL WOMAN EXAM WITH ROUTINE GYNECOLOGICAL EXAM: Primary | ICD-10-CM

## 2023-07-11 DIAGNOSIS — Z11.3 SCREENING EXAMINATION FOR STD (SEXUALLY TRANSMITTED DISEASE): ICD-10-CM

## 2023-07-11 DIAGNOSIS — Z87.890 PERSON WHO HAS UNDERGONE GENDER REASSIGNMENT SURGERY: ICD-10-CM

## 2023-07-11 LAB
HAV IGM SERPL QL IA: NONREACTIVE
HBV CORE IGM SERPL QL IA: NONREACTIVE
HBV SURFACE AG SERPL QL IA: NONREACTIVE
HCV AB SERPL QL IA: NONREACTIVE
HIV 1+2 AB+HIV1 P24 AG SERPL QL IA: NONREACTIVE

## 2023-07-11 PROCEDURE — 99395 PREV VISIT EST AGE 18-39: CPT | Performed by: OBSTETRICS & GYNECOLOGY

## 2023-07-11 ASSESSMENT — ENCOUNTER SYMPTOMS
SHORTNESS OF BREATH: 0
ABDOMINAL PAIN: 0
COUGH: 0

## 2023-07-12 LAB
CANDIDA SPECIES, DNA PROBE: NEGATIVE
GARDNERELLA VAGINALIS, DNA PROBE: NEGATIVE
SOURCE: NORMAL
TRICHOMONAS VAGINALIS DNA: NEGATIVE

## 2023-07-13 LAB
C TRACH DNA SPEC QL PROBE+SIG AMP: NEGATIVE
C TRACH DNA SPEC QL PROBE+SIG AMP: NEGATIVE
N GONORRHOEA DNA SPEC QL PROBE+SIG AMP: NEGATIVE
N GONORRHOEA DNA SPEC QL PROBE+SIG AMP: NEGATIVE
SPECIMEN DESCRIPTION: NORMAL
SPECIMEN DESCRIPTION: NORMAL

## 2023-08-03 ENCOUNTER — TELEPHONE (OUTPATIENT)
Dept: NEUROLOGY | Age: 28
End: 2023-08-03

## 2023-08-03 NOTE — TELEPHONE ENCOUNTER
I called [facility]. They said they filled it on 6/5/23 the orig. and then the refill was just filled on 7/28/23. It is still waiting for him to  with several other of his medications. No refill needed right now.

## 2023-08-03 NOTE — TELEPHONE ENCOUNTER
----- Message from Vu Vargas MD sent at 8/2/2023 10:09 AM EDT -----  Regarding: FW: Rx requests from pharmacy mistakenly sent to [facility]  Can  you please check if this patient does need a refill or if already refilled his medication below?  ----- Message -----  From: Cortes Cao, [facility] [place] [facility]  Sent: 7/28/2023   4:33 PM EDT  To: Vu Vargas MD  Subject: Rx requests from pharmacy mistakenly sent to#    Hello,    A refill request was mistakenly faxed to the medication management clinic at [facility] for this patient. The request was for eszopiclone 3 mg. If appropriate, please send a refill of this medication for this patient to The [address] [place] [address] ([address] [place], [address]; phone [phone]; fax [phone]).     Thanks,  Cortes Cao, STEFFANY,PharmD, BCACP  7/28/2023  4:33 PM

## 2023-08-08 ENCOUNTER — OFFICE VISIT (OUTPATIENT)
Dept: GASTROENTEROLOGY | Age: 28
End: 2023-08-08
Payer: COMMERCIAL

## 2023-08-08 VITALS — WEIGHT: 195.4 LBS | SYSTOLIC BLOOD PRESSURE: 125 MMHG | DIASTOLIC BLOOD PRESSURE: 79 MMHG | BODY MASS INDEX: 35.74 KG/M2

## 2023-08-08 DIAGNOSIS — K21.9 GASTROESOPHAGEAL REFLUX DISEASE WITHOUT ESOPHAGITIS: Primary | ICD-10-CM

## 2023-08-08 PROCEDURE — G8417 CALC BMI ABV UP PARAM F/U: HCPCS | Performed by: INTERNAL MEDICINE

## 2023-08-08 PROCEDURE — 99213 OFFICE O/P EST LOW 20 MIN: CPT | Performed by: INTERNAL MEDICINE

## 2023-08-08 PROCEDURE — 1036F TOBACCO NON-USER: CPT | Performed by: INTERNAL MEDICINE

## 2023-08-08 PROCEDURE — G8427 DOCREV CUR MEDS BY ELIG CLIN: HCPCS | Performed by: INTERNAL MEDICINE

## 2023-08-08 ASSESSMENT — ENCOUNTER SYMPTOMS
ABDOMINAL PAIN: 1
WHEEZING: 0
BACK PAIN: 0
CONSTIPATION: 1
BLOOD IN STOOL: 0
DIARRHEA: 0
COUGH: 1
SHORTNESS OF BREATH: 1
ABDOMINAL DISTENTION: 0
NAUSEA: 0
CHEST TIGHTNESS: 0
VOMITING: 0

## 2023-08-25 ENCOUNTER — OFFICE VISIT (OUTPATIENT)
Dept: DERMATOLOGY | Age: 28
End: 2023-08-25
Payer: COMMERCIAL

## 2023-08-25 VITALS
TEMPERATURE: 98.1 F | HEART RATE: 81 BPM | HEIGHT: 62 IN | WEIGHT: 192 LBS | OXYGEN SATURATION: 97 % | BODY MASS INDEX: 35.33 KG/M2 | SYSTOLIC BLOOD PRESSURE: 124 MMHG | DIASTOLIC BLOOD PRESSURE: 85 MMHG

## 2023-08-25 DIAGNOSIS — L21.9 SEBORRHEIC DERMATITIS: Primary | ICD-10-CM

## 2023-08-25 PROCEDURE — 1036F TOBACCO NON-USER: CPT | Performed by: PHYSICIAN ASSISTANT

## 2023-08-25 PROCEDURE — G8417 CALC BMI ABV UP PARAM F/U: HCPCS | Performed by: PHYSICIAN ASSISTANT

## 2023-08-25 PROCEDURE — G8427 DOCREV CUR MEDS BY ELIG CLIN: HCPCS | Performed by: PHYSICIAN ASSISTANT

## 2023-08-25 PROCEDURE — 99213 OFFICE O/P EST LOW 20 MIN: CPT | Performed by: PHYSICIAN ASSISTANT

## 2023-08-25 RX ORDER — TRIAMCINOLONE ACETONIDE 0.25 MG/G
CREAM TOPICAL
Qty: 80 G | Refills: 2 | Status: SHIPPED | OUTPATIENT
Start: 2023-08-25

## 2023-08-25 RX ORDER — FLUOCINONIDE TOPICAL SOLUTION USP, 0.05% 0.5 MG/ML
SOLUTION TOPICAL
Qty: 60 ML | Refills: 2 | Status: SHIPPED | OUTPATIENT
Start: 2023-08-25

## 2023-08-25 RX ORDER — TRIAMCINOLONE ACETONIDE 1 MG/G
CREAM TOPICAL
Qty: 80 G | Refills: 1 | Status: CANCELLED | OUTPATIENT
Start: 2023-08-25

## 2023-08-25 RX ORDER — KETOCONAZOLE 20 MG/ML
SHAMPOO TOPICAL
Qty: 120 ML | Refills: 2 | Status: CANCELLED | OUTPATIENT
Start: 2023-08-25

## 2023-08-28 ENCOUNTER — OFFICE VISIT (OUTPATIENT)
Dept: FAMILY MEDICINE CLINIC | Age: 28
End: 2023-08-28
Payer: COMMERCIAL

## 2023-08-28 VITALS
DIASTOLIC BLOOD PRESSURE: 80 MMHG | HEART RATE: 83 BPM | OXYGEN SATURATION: 96 % | SYSTOLIC BLOOD PRESSURE: 122 MMHG | WEIGHT: 188 LBS | BODY MASS INDEX: 34.39 KG/M2

## 2023-08-28 DIAGNOSIS — Z13.220 SCREENING FOR HYPERLIPIDEMIA: ICD-10-CM

## 2023-08-28 DIAGNOSIS — R09.89 CHRONIC THROAT CLEARING: Primary | ICD-10-CM

## 2023-08-28 DIAGNOSIS — J30.1 SEASONAL ALLERGIC RHINITIS DUE TO POLLEN: ICD-10-CM

## 2023-08-28 PROCEDURE — G8427 DOCREV CUR MEDS BY ELIG CLIN: HCPCS | Performed by: NURSE PRACTITIONER

## 2023-08-28 PROCEDURE — G8417 CALC BMI ABV UP PARAM F/U: HCPCS | Performed by: NURSE PRACTITIONER

## 2023-08-28 PROCEDURE — 99213 OFFICE O/P EST LOW 20 MIN: CPT | Performed by: NURSE PRACTITIONER

## 2023-08-28 PROCEDURE — 1036F TOBACCO NON-USER: CPT | Performed by: NURSE PRACTITIONER

## 2023-08-28 RX ORDER — CETIRIZINE HYDROCHLORIDE 10 MG/1
10 TABLET ORAL DAILY
Qty: 90 TABLET | Refills: 1 | Status: SHIPPED | OUTPATIENT
Start: 2023-08-28 | End: 2024-02-24

## 2023-08-28 RX ORDER — KETOCONAZOLE 20 MG/G
CREAM TOPICAL
Qty: 60 G | Refills: 2 | Status: SHIPPED | OUTPATIENT
Start: 2023-08-28

## 2023-08-28 NOTE — PROGRESS NOTES
Dermatology Patient Note  [address]  [address] [place] [address] Road [facility]  Dept: [phone]  Dept Fax: [phone]      VISITDATE: 8/25/2023   REFERRING PROVIDER: No ref. provider found      Rose Diehl is a 33 y.o. adult  who presents today in the office for:    Follow-up (Psoriasis, Pt noticed a new spot on back of neck approx 2 mos. Ago. No other area of concern.  //) and Seborrheic Dermatitis (No issues at this time.)      HISTORY OF PRESENT ILLNESS:  As above. Pt states that scalp is doing well with ketoconazole shampoo and Lidex solution. 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 person 05/23/2022     Priority: Medium    Arachnoid cyst 04/08/2022    Hyperlipidemia 03/13/2022    Other fatigue 03/13/2022    Acute diarrhea 03/13/2022    Gastroesophageal reflux disease without esophagitis 03/13/2022    Allergic rhinitis 03/13/2022    Dermatitis 03/13/2022    Congenital pes planus 03/13/2022    Hypothyroidism 03/08/2022    Vitamin D deficiency 03/08/2022    On statin therapy 03/08/2022    Pain in both feet 08/10/2016    Excessive daytime sleepiness 06/21/2016    Flat feet, bilateral 06/21/2016    Depression with anxiety 10/28/2014       CURRENT MEDICATIONS:   Current Outpatient Medications   Medication Sig Dispense Refill    ketoconazole (NIZORAL) 2 % cream Apply to affected area BID PRN flares. Combine with triamcinolone 0.025% cream when using. 60 g 2    fluocinonide (LIDEX) 0.05 % external solution Apply to scalp twice daily, as needed, for itching/scaling. 60 mL 2    triamcinolone (KENALOG) 0.025 % cream Apply to rash on face twice daily, as needed, for itching/scaling.

## 2023-08-30 ASSESSMENT — ENCOUNTER SYMPTOMS
DIARRHEA: 0
BACK PAIN: 0
SINUS PAIN: 0
SHORTNESS OF BREATH: 0
SINUS PRESSURE: 0
NAUSEA: 0
CHEST TIGHTNESS: 0
VOMITING: 0
ABDOMINAL PAIN: 0
EYE PAIN: 0
CONSTIPATION: 0
COLOR CHANGE: 0

## 2023-09-01 DIAGNOSIS — M25.50 ARTHRALGIA, UNSPECIFIED JOINT: Primary | ICD-10-CM

## 2023-09-01 DIAGNOSIS — M25.511 BILATERAL SHOULDER PAIN, UNSPECIFIED CHRONICITY: ICD-10-CM

## 2023-09-01 DIAGNOSIS — M25.512 BILATERAL SHOULDER PAIN, UNSPECIFIED CHRONICITY: ICD-10-CM

## 2023-09-01 DIAGNOSIS — M25.552 BILATERAL HIP PAIN: ICD-10-CM

## 2023-09-01 DIAGNOSIS — M25.551 BILATERAL HIP PAIN: ICD-10-CM

## 2023-09-11 ENCOUNTER — OFFICE VISIT (OUTPATIENT)
Dept: PODIATRY | Age: 28
End: 2023-09-11

## 2023-09-11 VITALS — WEIGHT: 188 LBS | BODY MASS INDEX: 34.6 KG/M2 | HEIGHT: 62 IN

## 2023-09-11 DIAGNOSIS — M21.41 PES PLANUS OF BOTH FEET: ICD-10-CM

## 2023-09-11 DIAGNOSIS — M72.2 PLANTAR FASCIITIS: Primary | ICD-10-CM

## 2023-09-11 DIAGNOSIS — M21.42 PES PLANUS OF BOTH FEET: ICD-10-CM

## 2023-09-15 DIAGNOSIS — M25.50 ARTHRALGIA, UNSPECIFIED JOINT: ICD-10-CM

## 2023-09-15 DIAGNOSIS — R53.83 OTHER FATIGUE: Primary | ICD-10-CM

## 2023-09-22 RX ORDER — BETAMETHASONE SODIUM PHOSPHATE AND BETAMETHASONE ACETATE 3; 3 MG/ML; MG/ML
9 INJECTION, SUSPENSION INTRA-ARTICULAR; INTRALESIONAL; INTRAMUSCULAR; SOFT TISSUE ONCE
Qty: 1.5 ML | Refills: 0
Start: 2023-09-22 | End: 2023-09-22

## 2023-09-22 NOTE — PROGRESS NOTES
0. 025 % cream Apply to rash on face twice daily, as needed, for itching/scaling. 8/25/23  Yes Erika Flores PA-C   hydrocortisone (ANUSOL-HC) 2.5 % CREA rectal cream Prn hemorrhoid symptoms x 7 nights. 5/11/23  Yes Kodak Hartman MD   fluticasone [facility]) 50 MCG/ACT nasal spray INSTILL 2 SPRAYS in each nostril ONCE DAILY 4/28/23  Yes IONA Baum - NP   ketoconazole (NIZORAL) 2 % shampoo Apply 3-4 times weekly to scalp, leave on for five minutes prior to washing off 4/3/23  Yes Erika Flores PA-C   omeprazole (PRILOSEC) 40 MG delayed release capsule Take 1 capsule by mouth every morning (before breakfast) 1/11/23  Yes Kodak Hartman MD   triamcinolone (KENALOG) 0.1 % cream Apply to rash twice daily (not face, armpit or groin) 3/11/22  Yes Erika Flores PA-C   B-D 3CC LUER-LENA SYR 30ID2-3/2 22G X 1-1/2\" 3 ML MISC  3/16/21  Yes Historical Provider, MD   busPIRone (BUSPAR) 10 MG tablet Patient takes 2 tablets at one time to equal 20 mg 11/18/20  Yes Historical Provider, MD   vitamin D (ERGOCALCIFEROL) 1.25 MG (93420 UT) CAPS capsule  11/13/20  Yes Historical Provider, MD   BD DISP NEEDLE 23G X 1\" [facility]  11/16/20  Yes Historical Provider, MD   testosterone cypionate (DEPOTESTOTERONE CYPIONATE) 200 MG/ML injection  12/21/20  Yes Historical Provider, MD   levothyroxine (SYNTHROID) 50 MCG tablet Take 1 tablet by mouth Daily   Yes Historical Provider, MD   Ferrous Sulfate (IRON PO) Take by mouth   Yes Historical Provider, MD   atorvastatin (LIPITOR) 10 MG tablet TAKE 1 TABLET BY MOUTH ONE TIME A DAY 11/26/19  Yes Historical Provider, MD   vilazodone HCl (VIIBRYD) 20 MG TABS Take 1 tablet by mouth daily   Yes Historical Provider, MD       Review of Systems    Review of Systems:  History obtained from chart review and the patient  General ROS: negative for - chills, fatigue, fever, night sweats or weight gain  Constitutional: Negative for chills, diaphoresis, fatigue, fever and unexpected weight change.   Musculoskeletal:

## 2023-10-09 ENCOUNTER — OFFICE VISIT (OUTPATIENT)
Dept: PODIATRY | Age: 28
End: 2023-10-09

## 2023-10-09 VITALS — BODY MASS INDEX: 34.6 KG/M2 | WEIGHT: 188 LBS | HEIGHT: 62 IN

## 2023-10-09 DIAGNOSIS — M79.604 PAIN IN BOTH LOWER EXTREMITIES: ICD-10-CM

## 2023-10-09 DIAGNOSIS — M79.605 PAIN IN BOTH LOWER EXTREMITIES: ICD-10-CM

## 2023-10-09 DIAGNOSIS — M21.42 PES PLANUS OF BOTH FEET: ICD-10-CM

## 2023-10-09 DIAGNOSIS — M21.41 PES PLANUS OF BOTH FEET: ICD-10-CM

## 2023-10-09 DIAGNOSIS — M72.2 PLANTAR FASCIITIS: Primary | ICD-10-CM

## 2023-10-09 RX ORDER — TIZANIDINE 2 MG/1
TABLET ORAL
COMMUNITY

## 2023-10-09 NOTE — PROGRESS NOTES
[address] [facility],[address] PODIATRY [address] [address]  Dept: [phone]  Dept Fax: [phone]    RETURN PATIENT PROGRESS NOTE  Date of patient's visit: 10/9/2023  Patient's Name:  Peter Burnette YOB: 1995            Patient Care Team:  IONA Madison NP as PCP - General (Nurse Practitioner)  IONA Madison NP as PCP - Empaneled Provider  Radha Khan DPM as Consulting Physician (Podiatry)       Peter Burnette 33 y.o. adult that presents for follow-up of   Chief Complaint   Patient presents with    Foot Pain     4 week follow up left heel pain   Right heel pain x 4 weeks since starting exercise        Symptoms began 1+ month(s) ago and are improved to left heel . Patient relates pain is Present and increased to right heel. Pain is rated 4 out of 10 and is described as constant, mild, moderate. Treatments prior to today's visit include: previous podiatry treatment and injection in the right foot, would like to discuss another injection today. Currently denies F/C/N/V. Allergies   Allergen Reactions    Bupropion      Unknown Reaction. Lunesta [Eszopiclone] Itching    Adhesive Tape Dermatitis and Rash       Past Medical History:   Diagnosis Date    Concussion with no loss of consciousness 4/8/2022    Depression        Prior to Admission medications    Medication Sig Start Date End Date Taking? Authorizing Provider   tiZANidine (ZANAFLEX) 2 MG tablet 1 tablet as needed Orally Q HS for 30 days   Yes Provider, MD Renu   ketoconazole (NIZORAL) 2 % cream Apply to affected area BID PRN flares. Combine with triamcinolone 0.025% cream when using.  8/28/23  Yes Tommy Mccain PA-C   cetirizine (ZYRTEC) 10 MG tablet Take 1 tablet by mouth daily 8/28/23 2/24/24 Yes IONA Madison NP   fluocinonide (LIDEX) 0.05 % external solution Apply to scalp twice daily, as needed, for

## 2023-10-15 RX ORDER — BETAMETHASONE SODIUM PHOSPHATE AND BETAMETHASONE ACETATE 3; 3 MG/ML; MG/ML
9 INJECTION, SUSPENSION INTRA-ARTICULAR; INTRALESIONAL; INTRAMUSCULAR; SOFT TISSUE ONCE
Qty: 1.5 ML | Refills: 0
Start: 2023-10-15 | End: 2023-10-15

## 2023-11-08 ENCOUNTER — OFFICE VISIT (OUTPATIENT)
Dept: GASTROENTEROLOGY | Age: 28
End: 2023-11-08
Payer: COMMERCIAL

## 2023-11-08 VITALS
HEIGHT: 62 IN | WEIGHT: 187 LBS | BODY MASS INDEX: 34.41 KG/M2 | SYSTOLIC BLOOD PRESSURE: 123 MMHG | DIASTOLIC BLOOD PRESSURE: 85 MMHG

## 2023-11-08 DIAGNOSIS — K21.9 GASTROESOPHAGEAL REFLUX DISEASE WITHOUT ESOPHAGITIS: Primary | ICD-10-CM

## 2023-11-08 PROCEDURE — 99213 OFFICE O/P EST LOW 20 MIN: CPT | Performed by: INTERNAL MEDICINE

## 2023-11-08 PROCEDURE — 1036F TOBACCO NON-USER: CPT | Performed by: INTERNAL MEDICINE

## 2023-11-08 PROCEDURE — G8427 DOCREV CUR MEDS BY ELIG CLIN: HCPCS | Performed by: INTERNAL MEDICINE

## 2023-11-08 PROCEDURE — G8417 CALC BMI ABV UP PARAM F/U: HCPCS | Performed by: INTERNAL MEDICINE

## 2023-11-08 PROCEDURE — G8484 FLU IMMUNIZE NO ADMIN: HCPCS | Performed by: INTERNAL MEDICINE

## 2023-11-08 RX ORDER — OMEPRAZOLE 40 MG/1
40 CAPSULE, DELAYED RELEASE ORAL
Qty: 90 CAPSULE | Refills: 3 | Status: SHIPPED | OUTPATIENT
Start: 2023-11-08

## 2023-11-08 RX ORDER — FAMOTIDINE 10 MG
10 TABLET ORAL DAILY
COMMUNITY

## 2023-11-08 RX ORDER — DULOXETIN HYDROCHLORIDE 20 MG/1
20 CAPSULE, DELAYED RELEASE ORAL DAILY
COMMUNITY

## 2023-11-08 RX ORDER — OMEPRAZOLE 20 MG/1
20 TABLET, DELAYED RELEASE ORAL DAILY
Qty: 30 TABLET | Refills: 3 | Status: SHIPPED | OUTPATIENT
Start: 2023-11-08

## 2023-11-08 ASSESSMENT — ENCOUNTER SYMPTOMS
VOMITING: 0
ABDOMINAL DISTENTION: 0
BACK PAIN: 0
BLOOD IN STOOL: 0
SHORTNESS OF BREATH: 1
DIARRHEA: 0
WHEEZING: 0
ABDOMINAL PAIN: 1
CONSTIPATION: 1
NAUSEA: 1
CHEST TIGHTNESS: 0
COUGH: 1

## 2023-11-08 NOTE — PROGRESS NOTES
Food Insecurity: No Food Insecurity (2/10/2023)    Hunger Vital Sign     Worried About Running Out of Food in the Last Year: Never true     Ran Out of Food in the Last Year: Never true   Transportation Needs: Unknown (2/10/2023)    PRAPARE - Transportation     Lack of Transportation (Medical): Not on file     Lack of Transportation (Non-Medical): No   Physical Activity: Not on file   Stress: Not on file   Social Connections: Not on file   Intimate Partner Violence: Not on file   Housing Stability: Unknown (2/10/2023)    Housing Stability Vital Sign     Unable to Pay for Housing in the Last Year: Not on file     Number of Places Lived in the Last Year: Not on file     Unstable Housing in the Last Year: No       REVIEW OF SYSTEMS: A 12-point review of systems was obtained and pertinent positives and negatives were listed below. REVIEW OF SYSTEMS:     Constitutional: No fever, no chills, no lethargy, no weakness. HEENT:  No headache, otalgia, itchy eyes, nasal discharge or sore throat. Cardiac:  No chest pain, dyspnea, orthopnea or PND. Chest:   No cough, phlegm or wheezing. Abdomen:      Detailed by MA   Neuro:  No focal weakness, abnormal movements or seizure like activity. Skin:   No rashes, no itching. :   No hematuria, no pyuria, no dysuria, no flank pain. Extremities:  No swelling or joint pains. ROS was otherwise negative    Review of Systems   Constitutional:  Positive for fatigue. Negative for chills, fever and unexpected weight change. HENT: Negative. Eyes:  Negative for visual disturbance. Respiratory:  Positive for cough and shortness of breath. Negative for chest tightness and wheezing. Cardiovascular:  Negative for chest pain, palpitations and leg swelling. Gastrointestinal:  Positive for abdominal pain, constipation and nausea. Negative for abdominal distention, blood in stool, diarrhea and vomiting. Genitourinary: Negative. Negative for dysuria, hematuria and urgency.

## 2023-12-12 ENCOUNTER — TELEMEDICINE (OUTPATIENT)
Dept: NEUROLOGY | Age: 28
End: 2023-12-12
Payer: COMMERCIAL

## 2023-12-12 DIAGNOSIS — G47.00 INSOMNIA, UNSPECIFIED TYPE: Primary | ICD-10-CM

## 2023-12-12 PROCEDURE — G8484 FLU IMMUNIZE NO ADMIN: HCPCS | Performed by: STUDENT IN AN ORGANIZED HEALTH CARE EDUCATION/TRAINING PROGRAM

## 2023-12-12 PROCEDURE — 1036F TOBACCO NON-USER: CPT | Performed by: STUDENT IN AN ORGANIZED HEALTH CARE EDUCATION/TRAINING PROGRAM

## 2023-12-12 PROCEDURE — 99214 OFFICE O/P EST MOD 30 MIN: CPT | Performed by: STUDENT IN AN ORGANIZED HEALTH CARE EDUCATION/TRAINING PROGRAM

## 2023-12-12 PROCEDURE — G8427 DOCREV CUR MEDS BY ELIG CLIN: HCPCS | Performed by: STUDENT IN AN ORGANIZED HEALTH CARE EDUCATION/TRAINING PROGRAM

## 2023-12-12 PROCEDURE — G8417 CALC BMI ABV UP PARAM F/U: HCPCS | Performed by: STUDENT IN AN ORGANIZED HEALTH CARE EDUCATION/TRAINING PROGRAM

## 2023-12-12 NOTE — PROGRESS NOTES
clear;  eyelids intact   Ears:  Normal external ear and canals   Nose: Nares normal, no drainage    Throat: Lips and tongue normal; teeth normal;  gums normal   Extremities: Extremities normal, no cyanosis, no edema   Skin: Skin color, texture normal, no rashes, no lesions                   ASSESSMENT AND PLAN:   This is a 33 yr old patient who presents for a follow up. Postconcussion syndrome secondary to a fall in April 2022, resolved   MRI of the brain in August 2022 was unremarkable    2. EDS/Insomnia  Previous vitamin B12 and TSH were normal  PSG did not show obstructive sleep apnea and MSLT showed a MSL of 10.24 minutes. Rilla Wilde was not effective, trazodone was not effective  Discussed ways to improve sleep hygiene  Discussed limiting naps as patient is take 3 naps that last 1-2 hours a day  Discussed CBTi in future  Start suvorexant 5 mg daily    Small posterior fossa arachnoid cyst which was found incidentally on MRI of the brain in August 2022     Teri Birmingham, was evaluated through a synchronous (real-time) audio-video encounter. The patient (or guardian if applicable) is aware that this is a billable service, which includes applicable co-pays. This Virtual Visit was conducted with patient's (and/or legal guardian's) consent. Patient identification was verified, and a caregiver was present when appropriate. The patient was located at Home: [address] [address]  Provider was located at [facility] (Appt Dept): [address] [place] [address],  [address]    Total time spent for this encounter: Not billed by time    --Vu Vargas MD on 12/12/2023 at 11:13 AM    Leana Banerjee was used to authenticate this note.    Vu Vargas MD   Neurology & Sleep Medicine  [facility]

## 2023-12-14 RX ORDER — OMEPRAZOLE 40 MG/1
40 CAPSULE, DELAYED RELEASE ORAL
Qty: 90 CAPSULE | Refills: 3 | OUTPATIENT
Start: 2023-12-14

## 2023-12-21 ENCOUNTER — TELEPHONE (OUTPATIENT)
Dept: NEUROLOGY | Age: 28
End: 2023-12-21

## 2024-01-08 DIAGNOSIS — J30.1 SEASONAL ALLERGIC RHINITIS DUE TO POLLEN: ICD-10-CM

## 2024-01-08 RX ORDER — CETIRIZINE HYDROCHLORIDE 10 MG/1
10 TABLET ORAL DAILY
Qty: 90 TABLET | Refills: 1 | Status: SHIPPED | OUTPATIENT
Start: 2024-01-08

## 2024-01-08 RX ORDER — FLUTICASONE PROPIONATE 50 MCG
2 SPRAY, SUSPENSION (ML) NASAL DAILY
Qty: 48 G | Refills: 5 | Status: SHIPPED | OUTPATIENT
Start: 2024-01-08

## 2024-01-08 NOTE — TELEPHONE ENCOUNTER
Last visit: 08/28/23  Last Med refill: 01/08/24  FOR FUTURE FILLS  Does patient have enough medication for 72 hours: Yes    Next Visit Date:  Future Appointments   Date Time Provider Department Center   2/21/2024  4:00 PM Latosha Lucero APRN - NP [facility]   4/8/2024 11:00 [facility]   8/27/2024  3:45 PM [facility]       Health Maintenance   Topic Date Due    Varicella vaccine (1 of 2 - 2-dose childhood series) Never done    DTaP/Tdap/Td vaccine (1 - Tdap) Never done    Hepatitis B vaccine (3 of 3 - 19+ 3-dose series) 07/11/2021    Lipids  03/10/2023    Flu vaccine (1) 08/01/2023    Depression Monitoring  02/10/2024    Pap smear  08/01/2025    Hepatitis A vaccine  Completed    COVID-19 Vaccine  Completed    Hepatitis C screen  Completed    HIV screen  Completed    Hib vaccine  Aged Out    HPV vaccine  Aged Out    Polio vaccine  Aged Out    Meningococcal (ACWY) vaccine  Aged Out    Pneumococcal 0-64 years Vaccine  Aged Out    Chlamydia/GC screen  Discontinued       No results found for: \"LABA1C\"          ( goal A1C is < 7)   No components found for: \"LABMICR\"  LDL Calculated (mg/dL)   Date Value   03/10/2022 69   06/29/2016 160       (goal LDL is <100)   AST (U/L)   Date Value   03/10/2022 44     ALT (U/L)   Date Value   03/10/2022 68     BUN (mg/dL)   Date Value   03/10/2022 11     BP Readings from Last 3 Encounters:   12/20/23 122/85   11/08/23 123/85   08/28/23 122/80          (goal 120/80)    All Future Testing planned in [facility]  Lab Frequency Next Occurrence   Lipid Panel Once 08/28/2023               Patient Active Problem List:     Depression with anxiety     Excessive daytime sleepiness     Flat feet, bilateral     Pain in both feet     Hypothyroidism     Vitamin D deficiency     On statin therapy     Hyperlipidemia     Other fatigue     Acute diarrhea     Gastroesophageal reflux disease without esophagitis     Allergic rhinitis     Dermatitis

## 2024-02-07 ASSESSMENT — PATIENT HEALTH QUESTIONNAIRE - PHQ9
6. FEELING BAD ABOUT YOURSELF - OR THAT YOU ARE A FAILURE OR HAVE LET YOURSELF OR YOUR FAMILY DOWN: 0
3. TROUBLE FALLING OR STAYING ASLEEP: SEVERAL DAYS
7. TROUBLE CONCENTRATING ON THINGS, SUCH AS READING THE NEWSPAPER OR WATCHING TELEVISION: SEVERAL DAYS
7. TROUBLE CONCENTRATING ON THINGS, SUCH AS READING THE NEWSPAPER OR WATCHING TELEVISION: 1
8. MOVING OR SPEAKING SO SLOWLY THAT OTHER PEOPLE COULD HAVE NOTICED. OR THE OPPOSITE, BEING SO FIGETY OR RESTLESS THAT YOU HAVE BEEN MOVING AROUND A LOT MORE THAN USUAL: 0
4. FEELING TIRED OR HAVING LITTLE ENERGY: 3
SUM OF ALL RESPONSES TO PHQ QUESTIONS 1-9: 5
10. IF YOU CHECKED OFF ANY PROBLEMS, HOW DIFFICULT HAVE THESE PROBLEMS MADE IT FOR YOU TO DO YOUR WORK, TAKE CARE OF THINGS AT HOME, OR GET ALONG WITH OTHER PEOPLE: SOMEWHAT DIFFICULT
5. POOR APPETITE OR OVEREATING: 0
10. IF YOU CHECKED OFF ANY PROBLEMS, HOW DIFFICULT HAVE THESE PROBLEMS MADE IT FOR YOU TO DO YOUR WORK, TAKE CARE OF THINGS AT HOME, OR GET ALONG WITH OTHER PEOPLE: 1
SUM OF ALL RESPONSES TO PHQ QUESTIONS 1-9: 5
2. FEELING DOWN, DEPRESSED OR HOPELESS: 0
9. THOUGHTS THAT YOU WOULD BE BETTER OFF DEAD, OR OF HURTING YOURSELF: 0
5. POOR APPETITE OR OVEREATING: NOT AT ALL
4. FEELING TIRED OR HAVING LITTLE ENERGY: NEARLY EVERY DAY
3. TROUBLE FALLING OR STAYING ASLEEP: 1
SUM OF ALL RESPONSES TO PHQ QUESTIONS 1-9: 5
6. FEELING BAD ABOUT YOURSELF - OR THAT YOU ARE A FAILURE OR HAVE LET YOURSELF OR YOUR FAMILY DOWN: NOT AT ALL
SUM OF ALL RESPONSES TO PHQ9 QUESTIONS 1 & 2: 0
1. LITTLE INTEREST OR PLEASURE IN DOING THINGS: 0
SUM OF ALL RESPONSES TO PHQ QUESTIONS 1-9: 5
9. THOUGHTS THAT YOU WOULD BE BETTER OFF DEAD, OR OF HURTING YOURSELF: NOT AT ALL
SUM OF ALL RESPONSES TO PHQ QUESTIONS 1-9: 5
8. MOVING OR SPEAKING SO SLOWLY THAT OTHER PEOPLE COULD HAVE NOTICED. OR THE OPPOSITE - BEING SO FIDGETY OR RESTLESS THAT YOU HAVE BEEN MOVING AROUND A LOT MORE THAN USUAL: NOT AT ALL
1. LITTLE INTEREST OR PLEASURE IN DOING THINGS: NOT AT ALL
2. FEELING DOWN, DEPRESSED OR HOPELESS: NOT AT ALL

## 2024-02-09 ENCOUNTER — OFFICE VISIT (OUTPATIENT)
Dept: FAMILY MEDICINE CLINIC | Age: 29
End: 2024-02-09
Payer: COMMERCIAL

## 2024-02-09 VITALS
HEIGHT: 62 IN | SYSTOLIC BLOOD PRESSURE: 122 MMHG | RESPIRATION RATE: 18 BRPM | BODY MASS INDEX: 35.98 KG/M2 | HEART RATE: 81 BPM | OXYGEN SATURATION: 98 % | DIASTOLIC BLOOD PRESSURE: 84 MMHG | WEIGHT: 195.5 LBS

## 2024-02-09 DIAGNOSIS — R00.2 HEART PALPITATIONS: ICD-10-CM

## 2024-02-09 DIAGNOSIS — Z00.00 ENCOUNTER FOR WELL ADULT EXAM WITHOUT ABNORMAL FINDINGS: Primary | ICD-10-CM

## 2024-02-09 DIAGNOSIS — M79.672 LEFT FOOT PAIN: ICD-10-CM

## 2024-02-09 DIAGNOSIS — M79.671 RIGHT FOOT PAIN: ICD-10-CM

## 2024-02-09 DIAGNOSIS — M35.7 HYPERMOBILITY SYNDROME: ICD-10-CM

## 2024-02-09 LAB
CHOLESTEROL/HDL RATIO: 3.6
CHOLESTEROL: 137 MG/DL
HDLC SERPL-MCNC: 38 MG/DL
LDL CHOLESTEROL CALCULATED: 69 MG/DL
TRIGL SERPL-MCNC: 151 MG/DL
VLDLC SERPL CALC-MCNC: 30 MG/DL

## 2024-02-09 PROCEDURE — G8484 FLU IMMUNIZE NO ADMIN: HCPCS | Performed by: NURSE PRACTITIONER

## 2024-02-09 PROCEDURE — 99395 PREV VISIT EST AGE 18-39: CPT | Performed by: NURSE PRACTITIONER

## 2024-02-09 ASSESSMENT — ENCOUNTER SYMPTOMS
VOMITING: 0
SINUS PRESSURE: 0
SHORTNESS OF BREATH: 0
DIARRHEA: 0
ABDOMINAL PAIN: 0
CONSTIPATION: 0
CHEST TIGHTNESS: 0
EYE PAIN: 0
SINUS PAIN: 0
BACK PAIN: 0
COLOR CHANGE: 0
NAUSEA: 0

## 2024-02-09 NOTE — PROGRESS NOTES
Well Adult Note  Name: Karon Monroy Today’s Date: 2024   MRN: [id number] Sex: Adult   Age: 28 y.o. Ethnicity: Non- / Non    : 1995 Race: White (non-)      Karon Monroy is here for well adult exam.  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 10-15 mins of rest helps.   Tracks HR with pulse ox at home     Has had several changes in medications over the last several months  Antidepressant was switched to cymbalta through rheumatology around 4 months ago  Testosterone was also switched to SC around the same time as new anti depressant   Will drink caffeine beverage maybe 1-2 times a month    Would like to meet with    Rheumatology does feel like he meets criteria for EDS     Actively doing PT for fibromyalgia and hyper mobility disorder  Has had increase in pain to hindfoot, achilles tendon area bilaterally with therapy  Pain has made it hard to walk some days   Requesting imaging today     Review of Systems   Constitutional:  Negative for activity change, chills, fatigue and unexpected weight change.   HENT:  Negative for hearing loss, postnasal drip, sinus pressure and sinus pain.    Eyes:  Negative for pain and visual disturbance.   Respiratory:  Negative for chest tightness and shortness of breath.    Cardiovascular:  Negative for chest pain and palpitations.   Gastrointestinal:  Negative for abdominal pain, constipation, diarrhea, nausea and vomiting.   Endocrine: Negative for polydipsia, polyphagia and polyuria.   Genitourinary:  Negative for dysuria, flank pain, frequency and urgency.   Musculoskeletal:  Positive for gait

## 2024-02-09 NOTE — PATIENT INSTRUCTIONS
not rearranged, and there is less chance of malnutrition. It also appears to control hunger better than lap banding. It might be safer than the lap banding because no foreign materials are used.  The gastric sleeve has a good success rate, and people lose an average of 33 percent of their excess body weight in the first year. For a person who is 120 pounds overweight, this would mean losing about 40 pounds in the first year.  WEIGHT LOSS SURGERY COMPLICATIONS -- A variety of complications can occur with weight loss surgery. The risks of surgery depend upon which surgery you have and any medical problems you had before surgery. Some of the more common early surgical complications (one to six weeks after surgery) include:  Bleeding   Infection   Blockage or tear in the bowels   Need for further surgery  Important medical complications after surgery can include blood clots in the legs or lungs, heart attack, pneumonia, and urinary tract infection.   Complications are less likely when surgery is performed in centers that are experienced in weight loss surgery. In general, centers with experience in weight loss surgery have:  Board-certified doctors and surgeons   A team of support staff (dietitians, counselors, nurses)   Long-term follow-up after surgery   Hospital staff experienced with the care of weight loss patients. This includes nurses who are trained in the care of patients immediately after surgery and anesthesiologists who are experienced in caring for the morbidly obese.  EFFECTIVENESS OF WEIGHT LOSS SURGERY -- The goal of weight loss surgery is to reduce the risk of illness or death associated with obesity. Weight loss surgery can also help you to feel and look better, reduce the amount of money you spend on medicines, and cut down on sick days.   As an example, weight loss surgery can improve health problems related to obesity (diabetes, high blood pressure, high cholesterol, sleep apnea) to the point that

## 2024-04-08 ENCOUNTER — OFFICE VISIT (OUTPATIENT)
Dept: GASTROENTEROLOGY | Age: 29
End: 2024-04-08
Payer: COMMERCIAL

## 2024-04-08 VITALS
HEART RATE: 56 BPM | BODY MASS INDEX: 35.55 KG/M2 | TEMPERATURE: 98.1 F | RESPIRATION RATE: 18 BRPM | OXYGEN SATURATION: 97 % | SYSTOLIC BLOOD PRESSURE: 126 MMHG | DIASTOLIC BLOOD PRESSURE: 79 MMHG | WEIGHT: 194.4 LBS

## 2024-04-08 DIAGNOSIS — Z79.890 HX OF ONGOING TREATMENT WITH HORMONAL THERAPY: ICD-10-CM

## 2024-04-08 DIAGNOSIS — K21.9 GASTROESOPHAGEAL REFLUX DISEASE, UNSPECIFIED WHETHER ESOPHAGITIS PRESENT: Primary | ICD-10-CM

## 2024-04-08 PROCEDURE — G8417 CALC BMI ABV UP PARAM F/U: HCPCS | Performed by: INTERNAL MEDICINE

## 2024-04-08 PROCEDURE — G8427 DOCREV CUR MEDS BY ELIG CLIN: HCPCS | Performed by: INTERNAL MEDICINE

## 2024-04-08 PROCEDURE — G2211 COMPLEX E/M VISIT ADD ON: HCPCS | Performed by: INTERNAL MEDICINE

## 2024-04-08 PROCEDURE — 1036F TOBACCO NON-USER: CPT | Performed by: INTERNAL MEDICINE

## 2024-04-08 PROCEDURE — 99213 OFFICE O/P EST LOW 20 MIN: CPT | Performed by: INTERNAL MEDICINE

## 2024-04-08 RX ORDER — OMEPRAZOLE 40 MG/1
40 CAPSULE, DELAYED RELEASE ORAL NIGHTLY
Qty: 90 CAPSULE | Refills: 2 | Status: SHIPPED | OUTPATIENT
Start: 2024-04-08 | End: 2024-10-05

## 2024-04-08 RX ORDER — ATENOLOL 25 MG/1
25 TABLET ORAL DAILY
COMMUNITY

## 2024-04-08 ASSESSMENT — ENCOUNTER SYMPTOMS
TROUBLE SWALLOWING: 0
RECTAL PAIN: 0
CONSTIPATION: 1
SORE THROAT: 1
DIARRHEA: 1
ABDOMINAL PAIN: 1
NAUSEA: 1
VOICE CHANGE: 0
COUGH: 0
ABDOMINAL DISTENTION: 1
ANAL BLEEDING: 0
VOMITING: 0
CHOKING: 0
WHEEZING: 0
BLOOD IN STOOL: 1

## 2024-04-08 NOTE — PROGRESS NOTES
Reason for Referral:       No referring provider defined for this encounter.    Chief Complaint   Patient presents with    Follow-up    Gastroesophageal Reflux           HISTORY OF PRESENT ILLNESS: Mr.Mackenzie SANGEETHA Monroy is a 28 y.o. adult with a past history remarkable for depression, gender dysphoria, mastectomy for cosmetic reasons, on testosterone supplementation, history of depression, chronic history of GERD and irregular bowel movements who is here for follow-up.  No acute issues or concerns reported.  Managing his constipation with lifestyle modifications.  Having adequate bowel movements.  Using Prilosec 40 mg at night with good control of GERD symptoms.      Previous Endoscopies    EGD 7/6/2022:  1) Small hypertrophic appearing gastric mucosa at the GEJ, s/p cold biopsy. No esophagitis, no hiatal hernia  2) Mild non-specific gastritis, no ulcerations, no erosions  3) Normal appearing duodenal mucosa     Colonoscopy 7/6/2022:  [facility] PRE[facility]     1) Normal appearing colonic and terminal ileal mucosa. No colitis, no ulcerations, no erosions, no ileitis.  2) Random left and right colon cold biopsies taken to evaluate for microscopic colitis  3) Small external hemorrhoids    Previous GI workup       Past Medical,Family, and Social History reviewed and does not contribute to the patient presentingcondition.    Patient's PMH/PSH,SH,PSYCH Hx, MEDs, ALLERGIES, and ROS were all reviewed and updated in the appropriate sections.    PAST MEDICAL HISTORY:  Past Medical History:   Diagnosis Date    Concussion with no loss of consciousness 04/08/2022    Depression     GERD (gastroesophageal reflux disease)     Headache     Migraine     Sleep difficulties        Past Surgical History:   Procedure Laterality Date    COLONOSCOPY  07/06/2022    COLONOSCOPY DIAGNOSTIC     COLONOSCOPY N/A 7/6/2022    [facility]OPSY [facility]    ESOPHAGOGASTRODUODENOSCOPY  07/06/2022     EGD

## 2024-05-15 DIAGNOSIS — M79.672 LEFT FOOT PAIN: ICD-10-CM

## 2024-05-15 DIAGNOSIS — M79.671 RIGHT FOOT PAIN: Primary | ICD-10-CM

## 2024-05-20 DIAGNOSIS — M79.672 LEFT FOOT PAIN: ICD-10-CM

## 2024-05-20 DIAGNOSIS — M79.671 RIGHT FOOT PAIN: ICD-10-CM

## 2024-05-30 ENCOUNTER — OFFICE VISIT (OUTPATIENT)
Dept: FAMILY MEDICINE CLINIC | Age: 29
End: 2024-05-30
Payer: COMMERCIAL

## 2024-05-30 VITALS
WEIGHT: 197 LBS | HEART RATE: 71 BPM | BODY MASS INDEX: 36.25 KG/M2 | RESPIRATION RATE: 18 BRPM | HEIGHT: 62 IN | SYSTOLIC BLOOD PRESSURE: 122 MMHG | OXYGEN SATURATION: 97 % | DIASTOLIC BLOOD PRESSURE: 72 MMHG

## 2024-05-30 DIAGNOSIS — Z23 NEED FOR TDAP VACCINATION: ICD-10-CM

## 2024-05-30 DIAGNOSIS — R06.02 SHORTNESS OF BREATH: Primary | ICD-10-CM

## 2024-05-30 PROCEDURE — G8417 CALC BMI ABV UP PARAM F/U: HCPCS | Performed by: NURSE PRACTITIONER

## 2024-05-30 PROCEDURE — 90715 TDAP VACCINE 7 YRS/> IM: CPT | Performed by: NURSE PRACTITIONER

## 2024-05-30 PROCEDURE — 1036F TOBACCO NON-USER: CPT | Performed by: NURSE PRACTITIONER

## 2024-05-30 PROCEDURE — G8427 DOCREV CUR MEDS BY ELIG CLIN: HCPCS | Performed by: NURSE PRACTITIONER

## 2024-05-30 PROCEDURE — 90471 IMMUNIZATION ADMIN: CPT | Performed by: NURSE PRACTITIONER

## 2024-05-30 PROCEDURE — 99213 OFFICE O/P EST LOW 20 MIN: CPT | Performed by: NURSE PRACTITIONER

## 2024-05-30 SDOH — ECONOMIC STABILITY: FOOD INSECURITY: WITHIN THE PAST 12 MONTHS, YOU WORRIED THAT YOUR FOOD WOULD RUN OUT BEFORE YOU GOT MONEY TO BUY MORE.: NEVER TRUE

## 2024-05-30 SDOH — ECONOMIC STABILITY: FOOD INSECURITY: WITHIN THE PAST 12 MONTHS, THE FOOD YOU BOUGHT JUST DIDN'T LAST AND YOU DIDN'T HAVE MONEY TO GET MORE.: NEVER TRUE

## 2024-05-30 SDOH — ECONOMIC STABILITY: INCOME INSECURITY: HOW HARD IS IT FOR YOU TO PAY FOR THE VERY BASICS LIKE FOOD, HOUSING, MEDICAL CARE, AND HEATING?: NOT HARD AT ALL

## 2024-05-30 SDOH — ECONOMIC STABILITY: TRANSPORTATION INSECURITY
IN THE PAST 12 MONTHS, HAS LACK OF TRANSPORTATION KEPT YOU FROM MEETINGS, WORK, OR FROM GETTING THINGS NEEDED FOR DAILY LIVING?: NO

## 2024-05-30 ASSESSMENT — ENCOUNTER SYMPTOMS
SHORTNESS OF BREATH: 1
VOMITING: 0
SPUTUM PRODUCTION: 0
HEMOPTYSIS: 0
ABDOMINAL PAIN: 0
SWOLLEN GLANDS: 0
WHEEZING: 0

## 2024-05-30 NOTE — PROGRESS NOTES
appearance.   HENT:      Head: Normocephalic.      Right Ear: Hearing normal.      Left Ear: Hearing normal.      Nose: Nose normal.      Mouth/Throat:      Mouth: Mucous membranes are moist.      Pharynx: Oropharynx is clear.   Eyes:      Extraocular Movements: Extraocular movements intact.      Conjunctiva/sclera: Conjunctivae normal.      Pupils: Pupils are equal, round, and reactive to light.   Cardiovascular:      Rate and Rhythm: Normal rate and regular rhythm.      Pulses: Normal pulses.      Heart sounds: Normal heart sounds.   Pulmonary:      Effort: Pulmonary effort is normal.      Breath sounds: Normal breath sounds and air entry. No decreased breath sounds, wheezing, rhonchi or rales.   Abdominal:      General: Abdomen is flat. Bowel sounds are normal.      Palpations: Abdomen is soft.   Musculoskeletal:         General: Normal range of motion.      Cervical back: Normal range of motion.   Skin:     General: Skin is warm and dry.      Capillary Refill: Capillary refill takes less than 2 seconds.   Neurological:      General: No focal deficit present.      Mental Status: He is alert and oriented to person, place, and time. Mental status is at baseline.   Psychiatric:         Mood and Affect: Mood normal.         Behavior: Behavior normal.         Thought Content: Thought content normal.         Judgment: Judgment normal.                Wt Readings from Last 3 Encounters:   05/30/24 89.4 kg (197 lb)   04/08/24 88.2 kg (194 lb 6.4 oz)   02/09/24 88.7 kg (195 lb 8 oz)     Temp Readings from Last 3 Encounters:   04/08/24 98.1 °F (36.7 °C) (Temporal)   12/20/23 98.5 °F (36.9 °C) (Tympanic)   08/25/23 98.1 °F (36.7 °C)     BP Readings from Last 3 Encounters:   05/30/24 122/72   04/08/24 126/79   02/09/24 122/84     Pulse Readings from Last 3 Encounters:   05/30/24 71   04/08/24 56   02/09/24 81   \      An electronic signature was used to authenticate this note.    --IONA Lawson - NP

## 2024-06-04 ENCOUNTER — HOSPITAL ENCOUNTER (OUTPATIENT)
Dept: PULMONOLOGY | Age: 29
Discharge: HOME | End: 2024-06-04
Payer: COMMERCIAL

## 2024-06-04 DIAGNOSIS — R06.02 SHORTNESS OF BREATH: ICD-10-CM

## 2024-06-04 LAB
DLCO %PRED: NORMAL
DLCO PRED: NORMAL
DLCO/VA %PRED: NORMAL
DLCO/VA PRED: NORMAL
DLCO/VA: NORMAL
DLCO: NORMAL
EXPIRATORY TIME-POST: NORMAL
EXPIRATORY TIME: NORMAL
FEF 25-75 %CHNG: NORMAL
FEF 25-75 POST %PRED: NORMAL
FEF 25-75% %PRED-PRE: NORMAL
FEF 25-75% PRED: NORMAL
FEF 25-75-POST: NORMAL
FEF 25-75-PRE: NORMAL
FEV1 %PRED-POST: NORMAL
FEV1 %PRED-PRE: NORMAL
FEV1 PRED: NORMAL
FEV1-POST: NORMAL
FEV1-PRE: NORMAL
FEV1/FVC %PRED-POST: NORMAL
FEV1/FVC %PRED-PRE: NORMAL
FEV1/FVC PRED: NORMAL
FEV1/FVC-POST: NORMAL
FEV1/FVC-PRE: NORMAL
FVC %PRED-POST: NORMAL
FVC %PRED-PRE: NORMAL
FVC PRED: NORMAL
FVC-POST: NORMAL
FVC-PRE: NORMAL
GAW %PRED: NORMAL
GAW PRED: NORMAL
GAW: NORMAL
IC PRE %PRED: NORMAL
IC PRED: NORMAL
IC: NORMAL
MEP: NORMAL
MIP: NORMAL
MVV %PRED-PRE: NORMAL
MVV PRED: NORMAL
MVV-PRE: NORMAL
PEF %PRED-POST: NORMAL
PEF %PRED-PRE: NORMAL
PEF PRED: NORMAL
PEF%CHNG: NORMAL
PEF-POST: NORMAL
PEF-PRE: NORMAL
RAW %PRED: NORMAL
RAW PRED: NORMAL
RAW: NORMAL
RV PRE %PRED: NORMAL
RV PRED: NORMAL
RV: NORMAL
SVC %PRED: NORMAL
SVC PRED: NORMAL
SVC: NORMAL
TLC PRE %PRED: NORMAL
TLC PRED: NORMAL
TLC: NORMAL
VA %PRED: NORMAL
VA PRED: NORMAL
VA: NORMAL
VTG %PRED: NORMAL
VTG PRED: NORMAL
VTG: NORMAL

## 2024-06-04 PROCEDURE — 94060 EVALUATION OF WHEEZING: CPT

## 2024-06-04 PROCEDURE — 94726 PLETHYSMOGRAPHY LUNG VOLUMES: CPT

## 2024-06-04 PROCEDURE — 94729 DIFFUSING CAPACITY: CPT

## 2024-06-04 PROCEDURE — 6370000000 HC RX 637 (ALT 250 FOR IP): Performed by: NURSE PRACTITIONER

## 2024-06-04 RX ORDER — ALBUTEROL SULFATE 90 UG/1
2 AEROSOL, METERED RESPIRATORY (INHALATION) ONCE
Status: COMPLETED | OUTPATIENT
Start: 2024-06-04 | End: 2024-06-04

## 2024-06-04 RX ADMIN — ALBUTEROL SULFATE 2 PUFF: 90 AEROSOL, METERED RESPIRATORY (INHALATION) at 16:19

## 2024-06-04 NOTE — PROCEDURES
Order attached by provider selecting check box at top of this note and this report is final.     Interpretation:  On review of spirometry forced vital capacity is 3.54 L which is 108% predicted, FEV1 3.08 L which is 109% predicted, FEV1 to FVC ratio is 87 which is 100% predicted, FEF 25-75 is 3.48 L which is 104% predicted.  No significant bronchodilator change was noted.  On review of lung volumes, total lung capacity is 4.70 L which is 98% predicted and residual volume 1.18 L which is 94% predicted.  DLCO is 27.86 which is 132% predicted.  Patient pulmonary function test suggest no significant obstructive, restrictive lung disease with normal DLCO.  If clinical suspicion for asthma is high methacholine challenge test can be considered as next step clinical correlation is recommended.  Electronically signed by     Carmen Page MD   Pulmonary Critical Care and Sleep Medicine,  [facility]  Office: [phone]

## 2024-07-11 DIAGNOSIS — J30.1 SEASONAL ALLERGIC RHINITIS DUE TO POLLEN: ICD-10-CM

## 2024-07-11 RX ORDER — CETIRIZINE HYDROCHLORIDE 10 MG/1
10 TABLET ORAL DAILY
Qty: 90 TABLET | Refills: 1 | Status: SHIPPED | OUTPATIENT
Start: 2024-07-11

## 2024-07-30 ENCOUNTER — OFFICE VISIT (OUTPATIENT)
Dept: PRIMARY CARE CLINIC | Age: 29
End: 2024-07-30
Payer: COMMERCIAL

## 2024-07-30 VITALS
SYSTOLIC BLOOD PRESSURE: 114 MMHG | TEMPERATURE: 99.8 F | BODY MASS INDEX: 36.25 KG/M2 | HEART RATE: 107 BPM | WEIGHT: 197 LBS | OXYGEN SATURATION: 97 % | HEIGHT: 62 IN | DIASTOLIC BLOOD PRESSURE: 77 MMHG

## 2024-07-30 DIAGNOSIS — J02.9 SORE THROAT: ICD-10-CM

## 2024-07-30 DIAGNOSIS — J02.9 PHARYNGITIS, UNSPECIFIED ETIOLOGY: Primary | ICD-10-CM

## 2024-07-30 LAB
Lab: 1
QC PASS/FAIL: NORMAL
S PYO AG THROAT QL: NORMAL
SARS-COV-2 RDRP RESP QL NAA+PROBE: NEGATIVE

## 2024-07-30 PROCEDURE — 87635 SARS-COV-2 COVID-19 AMP PRB: CPT | Performed by: NURSE PRACTITIONER

## 2024-07-30 PROCEDURE — G8427 DOCREV CUR MEDS BY ELIG CLIN: HCPCS | Performed by: NURSE PRACTITIONER

## 2024-07-30 PROCEDURE — 99213 OFFICE O/P EST LOW 20 MIN: CPT | Performed by: NURSE PRACTITIONER

## 2024-07-30 PROCEDURE — 87880 STREP A ASSAY W/OPTIC: CPT | Performed by: NURSE PRACTITIONER

## 2024-07-30 PROCEDURE — G8417 CALC BMI ABV UP PARAM F/U: HCPCS | Performed by: NURSE PRACTITIONER

## 2024-07-30 PROCEDURE — 1036F TOBACCO NON-USER: CPT | Performed by: NURSE PRACTITIONER

## 2024-07-30 RX ORDER — AZITHROMYCIN 250 MG/1
TABLET, FILM COATED ORAL
Qty: 1 PACKET | Refills: 0 | Status: SHIPPED | OUTPATIENT
Start: 2024-07-30

## 2024-07-30 ASSESSMENT — ENCOUNTER SYMPTOMS
SHORTNESS OF BREATH: 0
EYE REDNESS: 0
WHEEZING: 0
EYE DISCHARGE: 0
SINUS PRESSURE: 1
COUGH: 0
VOICE CHANGE: 0
CHEST TIGHTNESS: 0
SORE THROAT: 1

## 2024-07-30 NOTE — PROGRESS NOTES
negative.    Objective:      Physical Exam  Vitals and nursing note reviewed.   Constitutional:       General: He is not in acute distress.     Appearance: Normal appearance. He is well-developed. He is not ill-appearing, toxic-appearing or diaphoretic.   HENT:      Head: Normocephalic.      Right Ear: Tympanic membrane and external ear normal.      Left Ear: Tympanic membrane and external ear normal.      Nose: Nose normal.      Right Sinus: No maxillary sinus tenderness or frontal sinus tenderness.      Left Sinus: No maxillary sinus tenderness or frontal sinus tenderness.      Mouth/Throat:      Pharynx: Oropharyngeal exudate (PND) and posterior oropharyngeal erythema present.   Eyes:      General:         Right eye: No discharge.         Left eye: No discharge.   Cardiovascular:      Rate and Rhythm: Normal rate and regular rhythm.      Heart sounds: Normal heart sounds. No murmur heard.  Pulmonary:      Effort: Pulmonary effort is normal. No respiratory distress.      Breath sounds: Normal breath sounds. No wheezing or rales.   Lymphadenopathy:      Cervical: Cervical adenopathy present.   Skin:     General: Skin is warm.      Findings: No rash.   Neurological:      Mental Status: He is alert.       /77   Pulse (!) 107   Temp 99.8 °F (37.7 °C)   Ht 1.575 m (5' 2\")   Wt 89.4 kg (197 lb)   SpO2 97%   BMI 36.03 kg/m²     Results for orders placed or performed in visit on 07/30/24   POCT rapid strep A   Result Value Ref Range    Strep A Ag None Detected None Detected   POCT COVID-19 Rapid, NAAT   Result Value Ref Range    SARS-COV-2, RdRp gene Negative Negative    Lot Number 1     QC Pass/Fail Pass      Assessment:   Assessment & Plan    Diagnosis Orders   1. Pharyngitis, unspecified etiology  azithromycin (ZITHROMAX Z-ELIEZER) 250 MG tablet      2. Sore throat  POCT rapid strep A    POCT COVID-19 Rapid, NAAT        Plan:      Based on the clinical exam findings-- I will treat this as a bacterial

## 2024-08-14 ENCOUNTER — OFFICE VISIT (OUTPATIENT)
Dept: FAMILY MEDICINE CLINIC | Age: 29
End: 2024-08-14
Payer: COMMERCIAL

## 2024-08-14 ENCOUNTER — TELEPHONE (OUTPATIENT)
Dept: FAMILY MEDICINE CLINIC | Age: 29
End: 2024-08-14

## 2024-08-14 VITALS
SYSTOLIC BLOOD PRESSURE: 118 MMHG | HEIGHT: 62 IN | OXYGEN SATURATION: 95 % | HEART RATE: 106 BPM | RESPIRATION RATE: 18 BRPM | WEIGHT: 197 LBS | DIASTOLIC BLOOD PRESSURE: 78 MMHG | BODY MASS INDEX: 36.25 KG/M2

## 2024-08-14 DIAGNOSIS — R06.02 SHORTNESS OF BREATH: ICD-10-CM

## 2024-08-14 DIAGNOSIS — M79.671 RIGHT FOOT PAIN: ICD-10-CM

## 2024-08-14 DIAGNOSIS — E16.1 REACTIVE HYPOGLYCEMIA: Primary | ICD-10-CM

## 2024-08-14 DIAGNOSIS — M79.672 LEFT FOOT PAIN: ICD-10-CM

## 2024-08-14 PROCEDURE — 1036F TOBACCO NON-USER: CPT | Performed by: NURSE PRACTITIONER

## 2024-08-14 PROCEDURE — 99214 OFFICE O/P EST MOD 30 MIN: CPT | Performed by: NURSE PRACTITIONER

## 2024-08-14 PROCEDURE — G8427 DOCREV CUR MEDS BY ELIG CLIN: HCPCS | Performed by: NURSE PRACTITIONER

## 2024-08-14 PROCEDURE — G8417 CALC BMI ABV UP PARAM F/U: HCPCS | Performed by: NURSE PRACTITIONER

## 2024-08-14 NOTE — PROGRESS NOTES
Karon Monroy (:  1995) is a 29 y.o. adult,Established patient, here for evaluation of the following chief complaint(s):  6 Month Follow-Up         Assessment & Plan  Reactive hypoglycemia   Monitored by specialist- no acute findings meriting change in the plan    Orders:    Basic Metabolic Panel; Future    Hemoglobin A1C; Future    Right foot pain    Record request sent to [facility] to obtain final results from MRI    Orders:    Franki Greenfield DPM, Podiatry, [place]    Left foot pain    Record request sent to [facility] to obtain final results from MRI    Orders:    Franki Greenfield DPM, Podiatry, [place]    Shortness of breath   Unclear control. Front office to f/u with [facility] regarding PFT results not yet being finalized.        On this date 2024 I have spent 30 minutes reviewing previous notes, test results and face to face with the patient discussing the diagnosis and importance of compliance with the treatment plan as well as documenting on the day of the visit.      Return in about 6 months (around 2025) for physical.       Subjective   Presents for 6 month follow up of chronic conditions    Believes symptoms that he thought were hypoglycemia may have been related to stress and coming off of cymbalta   Did get started on arcabose through endo - this did help. Stopped med 2 weeks ago and has been stable.   Will be looking into differential diagnosis for reactive hypoglycemia and thyroid disorder     Completed MRI of b/l feet through [facility] - results were not yet sent to office for review. He has copy on [employer] today.  Continues to struggle with b/l heel pain, which he believes is an achilles tendon issue     Reports he completed PFT, reviewed results through juan daniel and saw they were negative           Review of Systems   Constitutional:  Negative for activity change, chills, fatigue and unexpected weight change.   HENT:  Negative for hearing loss,

## 2024-08-14 NOTE — TELEPHONE ENCOUNTER
[facility]TON for [place] lab for PFT. Arnel barajas pt test was completed beginning of June and it is still a prelim result. Called to see if this can be finalized so the provider can result this test.

## 2024-08-15 NOTE — TELEPHONE ENCOUNTER
Received a call back from [facility]. She stated the final result is in chart. She stated if you go under the encounter from 6/4 it is listed and can also be seen under Notes. States she is not sure why it says Prelim.

## 2024-08-16 ASSESSMENT — ENCOUNTER SYMPTOMS
VOMITING: 0
SINUS PAIN: 0
NAUSEA: 0
WHEEZING: 0
EYE PAIN: 0
CONSTIPATION: 0
COLOR CHANGE: 0
CHEST TIGHTNESS: 0
SINUS PRESSURE: 0
BACK PAIN: 0
SHORTNESS OF BREATH: 0
DIARRHEA: 0
ABDOMINAL PAIN: 0

## 2024-08-16 NOTE — TELEPHONE ENCOUNTER
Please notify department until it is changed to final, results are not forwarded to ordering provider for interpretation. This ultimately leads to a delay in patient care.    Please let Jh know PFT does look normal.  If we have additional concerns about shortness of breath I do advise meeting with pulmonology for additional testing.

## 2024-08-19 NOTE — TELEPHONE ENCOUNTER
LM at EEG/PFT dept for a call back in regards to PFT test done on 06/4/24 is showing completed-preliminary.  Called to see if can be marked as finalized

## 2024-08-20 DIAGNOSIS — M79.671 RIGHT FOOT PAIN: ICD-10-CM

## 2024-08-20 DIAGNOSIS — M79.672 LEFT FOOT PAIN: ICD-10-CM

## 2024-08-20 NOTE — TELEPHONE ENCOUNTER
Spoke with [facility] PFT dept in regards to prelim results. She said \"it is read and I already called back in regards to this last week.\" I explained to her \"I understand that but it is delaying patient care when it is not final and is in PCP results in epic.\" She states \"well she is in epic correct, she can read it correct.\" I stated \"yes 3 months later which is a delay in patient care.\" Latosha Lucero would not of known it was completed because it was not in her results basket to discuss with the patient. She stated \"I will defer to Atul Sharri her manager because she has never had an issue like this and if she can read it she does not know what the problem is.\" She also stated \"she thought this was urgent and she is with a patient so she had to end the call.\"

## 2024-08-20 NOTE — TELEPHONE ENCOUNTER
Atul Harrell the manager of [facility] called back.      he said there is nothing wrong with it it is signed. i tried to explain the prelim results. he told me stop looking under procedure tab and look at notes or chart review.      i told his i will have you call him cause apparently its not his job to send it to the provider. i told him is automatically goes when it is a final result.      [phone] is his phone number.

## 2024-08-22 NOTE — TELEPHONE ENCOUNTER
Spoke to Atul- He advised me that Dr. Leal is no longer reading PFT's and he's not able to go flip the results to final.     I advised Atul that he is able to flip it if the report is attached and he's able to use the \"enter/edit\" option under chart review and then the procedures tab.   He was able to view the PFT from that view and see that it did not say final. Atul changed it to Final, the results released and it was routed to Evelyn.

## 2024-08-26 DIAGNOSIS — E16.1 REACTIVE HYPOGLYCEMIA: ICD-10-CM

## 2024-08-26 LAB
ALBUMIN: 4.1 G/DL
ALP BLD-CCNC: 91 U/L
ALT SERPL-CCNC: 42 U/L
ANION GAP SERPL CALCULATED.3IONS-SCNC: NORMAL MMOL/L
AST SERPL-CCNC: 28 U/L
BASOPHILS ABSOLUTE: NORMAL
BASOPHILS RELATIVE PERCENT: NORMAL
BILIRUB SERPL-MCNC: 0.5 MG/DL (ref 0.1–1.4)
BUN BLDV-MCNC: 12 MG/DL
CALCIUM SERPL-MCNC: 9.3 MG/DL
CHLORIDE BLD-SCNC: 105 MMOL/L
CHOLESTEROL, TOTAL: 143 MG/DL
CHOLESTEROL/HDL RATIO: 3.2
CO2: 27 MMOL/L
CREAT SERPL-MCNC: 1.07 MG/DL
EOSINOPHILS ABSOLUTE: NORMAL
EOSINOPHILS RELATIVE PERCENT: NORMAL
ESTIMATED AVERAGE GLUCOSE: 120
GFR, ESTIMATED: 82.3
GLUCOSE BLD-MCNC: 94 MG/DL
HBA1C MFR BLD: 5.8 %
HCT VFR BLD CALC: 49.2 %
HDLC SERPL-MCNC: 45 MG/DL (ref 35–70)
HEMOGLOBIN: 16 G/DL
LDL CHOLESTEROL: 72
LYMPHOCYTES ABSOLUTE: NORMAL
LYMPHOCYTES RELATIVE PERCENT: NORMAL
MCH RBC QN AUTO: NORMAL PG
MCHC RBC AUTO-ENTMCNC: NORMAL G/DL
MCV RBC AUTO: NORMAL FL
MONOCYTES ABSOLUTE: NORMAL
MONOCYTES RELATIVE PERCENT: NORMAL
NEUTROPHILS ABSOLUTE: NORMAL
NEUTROPHILS RELATIVE PERCENT: NORMAL
NONHDLC SERPL-MCNC: NORMAL MG/DL
PLATELET # BLD: 284 K/ΜL
PMV BLD AUTO: NORMAL FL
POTASSIUM SERPL-SCNC: 4.8 MMOL/L
RBC # BLD: NORMAL 10*6/UL
SODIUM BLD-SCNC: 138 MMOL/L
TESTOSTERONE TOTAL: 224
TOTAL PROTEIN: 6.6 G/DL (ref 6.4–8.2)
TRIGL SERPL-MCNC: 131 MG/DL
VITAMIN D 25-HYDROXY: 35.2
VITAMIN D2, 25 HYDROXY: NORMAL
VITAMIN D3,25 HYDROXY: NORMAL
VLDLC SERPL CALC-MCNC: 26 MG/DL
WBC # BLD: 8.09 10^3/ML

## 2024-08-27 ENCOUNTER — OFFICE VISIT (OUTPATIENT)
Dept: DERMATOLOGY | Age: 29
End: 2024-08-27
Payer: COMMERCIAL

## 2024-08-27 VITALS
DIASTOLIC BLOOD PRESSURE: 72 MMHG | OXYGEN SATURATION: 97 % | HEIGHT: 62 IN | WEIGHT: 200 LBS | HEART RATE: 96 BPM | BODY MASS INDEX: 36.8 KG/M2 | TEMPERATURE: 98.3 F | SYSTOLIC BLOOD PRESSURE: 116 MMHG

## 2024-08-27 DIAGNOSIS — L40.9 PSORIASIS: Primary | ICD-10-CM

## 2024-08-27 PROCEDURE — G8417 CALC BMI ABV UP PARAM F/U: HCPCS | Performed by: PHYSICIAN ASSISTANT

## 2024-08-27 PROCEDURE — 1036F TOBACCO NON-USER: CPT | Performed by: PHYSICIAN ASSISTANT

## 2024-08-27 PROCEDURE — 99213 OFFICE O/P EST LOW 20 MIN: CPT | Performed by: PHYSICIAN ASSISTANT

## 2024-08-27 PROCEDURE — G8427 DOCREV CUR MEDS BY ELIG CLIN: HCPCS | Performed by: PHYSICIAN ASSISTANT

## 2024-08-27 RX ORDER — CLOBETASOL PROPIONATE 0.5 MG/G
AEROSOL, FOAM TOPICAL
Qty: 60 G | Refills: 1 | Status: SHIPPED | OUTPATIENT
Start: 2024-08-27

## 2024-08-27 RX ORDER — AMITRIPTYLINE HYDROCHLORIDE 50 MG/1
50 TABLET ORAL NIGHTLY
COMMUNITY

## 2024-08-27 NOTE — PROGRESS NOTES
Dermatology Patient Note  [facility] DERMATOLOGY  [address]  [address]  [place] [address]  Dept: [phone]  Dept Fax: [phone]      VISITDATE: 8/27/2024   REFERRING PROVIDER: No ref. provider found      Karon Monroy is a 29 y.o. adult  who presents today in the office for:    Other (Patient presents today for psoriasis/seb derm yearly follow up. Patient was prescribed ketoconazole cream and ketoconazole cream, states he is only using one cream and is not sure which one. He is seeing some improvement. He is using the ketoconazole shampoo as needed. )      HISTORY OF PRESENT ILLNESS:  As above.  Pt notes that psoriasis seems to be well controlled with prn use of ketoconazole shampoo (scalp) and triamcinolone 0.1% cream (body).  Pt does note an inability to use ketoconazole more than once per month, d/t the fact that it dries his scalp.     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 person 05/23/2022     Priority: Medium    Arachnoid cyst 04/08/2022    Hyperlipidemia 03/13/2022    Other fatigue 03/13/2022    Acute diarrhea 03/13/2022    Gastroesophageal reflux disease without esophagitis 03/13/2022    Allergic rhinitis 03/13/2022    Dermatitis 03/13/2022    Congenital pes planus 03/13/2022    Hypothyroidism 03/08/2022    Vitamin D deficiency 03/08/2022    On statin therapy 03/08/2022    Pain in both feet 08/10/2016    Excessive daytime sleepiness 06/21/2016    Flat feet, bilateral 06/21/2016    Depression with anxiety 10/28/2014       CURRENT MEDICATIONS:   Current Outpatient Medications   Medication Sig Dispense Refill    amitriptyline (ELAVIL) 50 MG tablet Take 1 tablet by mouth nightly

## 2024-08-28 DIAGNOSIS — Z13.220 SCREENING FOR HYPERLIPIDEMIA: ICD-10-CM

## 2024-09-10 DIAGNOSIS — R53.82 CHRONIC FATIGUE: Primary | ICD-10-CM

## 2024-09-10 RX ORDER — SODIUM CHLORIDE 9 MG/ML
5-250 INJECTION, SOLUTION INTRAVENOUS PRN
OUTPATIENT
Start: 2024-09-10

## 2024-09-13 ENCOUNTER — TELEPHONE (OUTPATIENT)
Dept: ONCOLOGY | Age: 29
End: 2024-09-13

## 2024-09-13 ENCOUNTER — TELEPHONE (OUTPATIENT)
Dept: INFUSION THERAPY | Facility: MEDICAL CENTER | Age: 29
End: 2024-09-13

## 2024-09-18 ENCOUNTER — HOSPITAL ENCOUNTER (OUTPATIENT)
Facility: MEDICAL CENTER | Age: 29
End: 2024-09-18
Payer: COMMERCIAL

## 2024-09-18 ENCOUNTER — HOSPITAL ENCOUNTER (OUTPATIENT)
Dept: INFUSION THERAPY | Facility: MEDICAL CENTER | Age: 29
Discharge: HOME | End: 2024-09-18
Payer: COMMERCIAL

## 2024-09-18 VITALS
DIASTOLIC BLOOD PRESSURE: 76 MMHG | SYSTOLIC BLOOD PRESSURE: 113 MMHG | RESPIRATION RATE: 16 BRPM | TEMPERATURE: 98.3 F | HEART RATE: 86 BPM

## 2024-09-18 DIAGNOSIS — R53.82 CHRONIC FATIGUE: Primary | ICD-10-CM

## 2024-09-18 LAB
CORTIS SERPL-MCNC: 17.6 UG/DL (ref 2.5–19.5)
CORTISOL COLLECTION INFO: NORMAL

## 2024-09-18 PROCEDURE — 82533 TOTAL CORTISOL: CPT

## 2024-09-18 PROCEDURE — 96374 THER/PROPH/DIAG INJ IV PUSH: CPT

## 2024-09-18 PROCEDURE — 6360000002 HC RX W HCPCS: Performed by: INTERNAL MEDICINE

## 2024-09-18 PROCEDURE — 96375 TX/PRO/DX INJ NEW DRUG ADDON: CPT

## 2024-09-18 PROCEDURE — 36415 COLL VENOUS BLD VENIPUNCTURE: CPT

## 2024-09-18 PROCEDURE — 2580000003 HC RX 258: Performed by: INTERNAL MEDICINE

## 2024-09-18 RX ORDER — SODIUM CHLORIDE 9 MG/ML
5-250 INJECTION, SOLUTION INTRAVENOUS PRN
OUTPATIENT
Start: 2024-09-18

## 2024-09-18 RX ORDER — SODIUM CHLORIDE 9 MG/ML
5-250 INJECTION, SOLUTION INTRAVENOUS PRN
Status: DISCONTINUED | OUTPATIENT
Start: 2024-09-18 | End: 2024-09-19 | Stop reason: HOSPADM

## 2024-09-18 RX ADMIN — COSYNTROPIN 1 MCG: 0.25 INJECTION, POWDER, LYOPHILIZED, FOR SOLUTION INTRAMUSCULAR; INTRAVENOUS at 10:20

## 2024-09-18 RX ADMIN — SODIUM CHLORIDE 100 ML/HR: 9 INJECTION, SOLUTION INTRAVENOUS at 10:10

## 2024-10-03 ENCOUNTER — OFFICE VISIT (OUTPATIENT)
Dept: PRIMARY CARE CLINIC | Age: 29
End: 2024-10-03
Payer: COMMERCIAL

## 2024-10-03 VITALS
SYSTOLIC BLOOD PRESSURE: 119 MMHG | OXYGEN SATURATION: 96 % | TEMPERATURE: 98 F | DIASTOLIC BLOOD PRESSURE: 84 MMHG | HEART RATE: 94 BPM

## 2024-10-03 DIAGNOSIS — H02.845 SWELLING OF LEFT LOWER EYELID: ICD-10-CM

## 2024-10-03 DIAGNOSIS — H10.022 ACUTE PURULENT CONJUNCTIVITIS, LEFT: Primary | ICD-10-CM

## 2024-10-03 PROCEDURE — 1036F TOBACCO NON-USER: CPT

## 2024-10-03 PROCEDURE — G8484 FLU IMMUNIZE NO ADMIN: HCPCS

## 2024-10-03 PROCEDURE — G8417 CALC BMI ABV UP PARAM F/U: HCPCS

## 2024-10-03 PROCEDURE — 99213 OFFICE O/P EST LOW 20 MIN: CPT

## 2024-10-03 PROCEDURE — G8427 DOCREV CUR MEDS BY ELIG CLIN: HCPCS

## 2024-10-03 RX ORDER — ERYTHROMYCIN 5 MG/G
OINTMENT OPHTHALMIC EVERY 6 HOURS
Qty: 3.5 G | Refills: 0 | Status: SHIPPED | OUTPATIENT
Start: 2024-10-03 | End: 2024-10-10

## 2024-10-03 RX ORDER — PREDNISONE 20 MG/1
40 TABLET ORAL DAILY
Qty: 10 TABLET | Refills: 0 | Status: SHIPPED | OUTPATIENT
Start: 2024-10-03 | End: 2024-10-08

## 2024-10-03 RX ORDER — CEPHALEXIN 500 MG/1
500 CAPSULE ORAL 2 TIMES DAILY
Qty: 14 CAPSULE | Refills: 0 | Status: SHIPPED | OUTPATIENT
Start: 2024-10-03 | End: 2024-10-10

## 2024-10-03 ASSESSMENT — ENCOUNTER SYMPTOMS
BACK PAIN: 0
EYE PAIN: 1
FOREIGN BODY SENSATION: 0
SINUS PAIN: 0
EYE ITCHING: 0
EYE REDNESS: 1
SINUS PRESSURE: 0
VOICE CHANGE: 0
NAUSEA: 0
COUGH: 0
DOUBLE VISION: 0
SHORTNESS OF BREATH: 0
WHEEZING: 0
FACIAL SWELLING: 0
APNEA: 0
COLOR CHANGE: 0
ABDOMINAL PAIN: 0
RECTAL PAIN: 0
ABDOMINAL DISTENTION: 0
ANAL BLEEDING: 0
STRIDOR: 0
EYE DISCHARGE: 1
CONSTIPATION: 0
CHEST TIGHTNESS: 0
BLURRED VISION: 0
VOMITING: 0
TROUBLE SWALLOWING: 0
GASTROINTESTINAL NEGATIVE: 1
DIARRHEA: 0
RESPIRATORY NEGATIVE: 1
RHINORRHEA: 0
PHOTOPHOBIA: 0
CHOKING: 0
BLOOD IN STOOL: 0
SORE THROAT: 0

## 2024-10-03 NOTE — PROGRESS NOTES
0    predniSONE (DELTASONE) 20 MG tablet     Sig: Take 2 tablets by mouth daily for 5 days     Dispense:  10 tablet     Refill:  0           Patient instructed to complete antibiotic prescription fully.  Warm compresses TID for 15 minutes at a time.  Tylenol/Motrin as needed for the discomfort/fever.  Patient agreeable to treatment plan.  Educational materials provided on AVS.  Follow up if symptoms do not improve/worsen.    Patient and/or parent given educational materials - see patient instructions.  Discussed use, benefit, and side effects of prescribed medications.  All patient questions answered.  Patient and/or parent voiced understanding.      Electronically signed by IONA Herrera 10/3/2024 at 10:11 AM

## 2024-10-07 ENCOUNTER — OFFICE VISIT (OUTPATIENT)
Dept: GASTROENTEROLOGY | Age: 29
End: 2024-10-07
Payer: COMMERCIAL

## 2024-10-07 ENCOUNTER — TELEPHONE (OUTPATIENT)
Dept: GASTROENTEROLOGY | Age: 29
End: 2024-10-07

## 2024-10-07 VITALS
DIASTOLIC BLOOD PRESSURE: 80 MMHG | SYSTOLIC BLOOD PRESSURE: 125 MMHG | TEMPERATURE: 98.1 F | RESPIRATION RATE: 19 BRPM | OXYGEN SATURATION: 96 % | WEIGHT: 204.6 LBS | BODY MASS INDEX: 37.65 KG/M2 | HEIGHT: 62 IN | HEART RATE: 64 BPM

## 2024-10-07 DIAGNOSIS — K21.9 GASTROESOPHAGEAL REFLUX DISEASE, UNSPECIFIED WHETHER ESOPHAGITIS PRESENT: ICD-10-CM

## 2024-10-07 DIAGNOSIS — R13.10 DYSPHAGIA, UNSPECIFIED TYPE: Primary | ICD-10-CM

## 2024-10-07 DIAGNOSIS — Z79.890 HX OF ONGOING TREATMENT WITH HORMONAL THERAPY: ICD-10-CM

## 2024-10-07 PROCEDURE — 99214 OFFICE O/P EST MOD 30 MIN: CPT | Performed by: INTERNAL MEDICINE

## 2024-10-07 PROCEDURE — G8417 CALC BMI ABV UP PARAM F/U: HCPCS | Performed by: INTERNAL MEDICINE

## 2024-10-07 PROCEDURE — G8484 FLU IMMUNIZE NO ADMIN: HCPCS | Performed by: INTERNAL MEDICINE

## 2024-10-07 PROCEDURE — 1036F TOBACCO NON-USER: CPT | Performed by: INTERNAL MEDICINE

## 2024-10-07 PROCEDURE — G8427 DOCREV CUR MEDS BY ELIG CLIN: HCPCS | Performed by: INTERNAL MEDICINE

## 2024-10-07 ASSESSMENT — ENCOUNTER SYMPTOMS
TROUBLE SWALLOWING: 1
DIARRHEA: 0
BLOOD IN STOOL: 0
VOMITING: 0
NAUSEA: 1
VOICE CHANGE: 0
COUGH: 0
CONSTIPATION: 0
COLOR CHANGE: 0
CHOKING: 0
WHEEZING: 0
SORE THROAT: 0
ANAL BLEEDING: 0
RECTAL PAIN: 0
ABDOMINAL PAIN: 1
ABDOMINAL DISTENTION: 1

## 2024-10-07 NOTE — TELEPHONE ENCOUNTER
Pt saw Dashawn today in clinic. Pt states he does not take blood thinners.    Procedure scheduled/Dr Tamez  Procedure: EGD  Dx: Dysphagia, unspecified type; GERD, unspecified whether esophagitis present  Date: Friday 10/18/24  Time: 12:00 pm/Arrive at 10:00 am  Hospital: [facility]; Surgery Entrance, back of hospital  PAT Phone Call: Tuesday 10/15/24 at 1:15 pm  Bowel Prep instructions given: EGD Prep  In office/via phone: in office  Clearance needed: N/A    Pt informed they will receive a PAT Phone Call from a [facility] PAT Nurse 1-2 weeks prior to procedure. Pt informed they must complete PAT Call or procedure may be cancelled.     Pt informed it is required they must have a /responsible adult that takes them to their procedure, stays at the hospital (before, during, and after procedure), and drives pt home. Pt informed /responsible adult must stay inside the hospital during their procedure. Pt voiced understanding of  protocol for procedure.

## 2024-10-07 NOTE — PROGRESS NOTES
08/26/2024    MCV 89.9 08/26/2024     08/26/2024     08/26/2024    K 4.8 08/26/2024     08/26/2024    CO2 27 08/26/2024    BUN 12 08/26/2024    CREATININE 1.07 08/26/2024    BILITOT 0.5 08/26/2024    ALKPHOS 91 08/26/2024    AST 28 08/26/2024    ALT 42 08/26/2024         Lab Results   Component Value Date    RBC 5.47 08/26/2024    HGB 16.0 08/26/2024    MCV 89.9 08/26/2024    MCH 29.3 08/26/2024    MCHC 32.5 08/26/2024    RDW 43.0 03/10/2022    MPV 8.1 02/07/2013    BASOPCT 0.9 03/10/2022    LYMPHSABS 2.71 03/10/2022    MONOSABS 0.63 03/10/2022    NEUTROABS 4.06 03/10/2022    EOSABS 0.17 03/10/2022    BASOSABS 0.07 03/10/2022         IMPRESSION: Mr. Monroy is a 29 y.o. adult with a past history remarkable for depression, gender dysphoria, mastectomy for cosmetic reasons, on testosterone supplementation, history of depression, chronic history of GERD and irregular bowel movements who is here for follow-up.  No acute issues or concerns reported.  Managing his constipation with lifestyle modifications.  Having adequate bowel movements.  Using Prilosec 40 mg at night with good control of GERD symptoms.    Assessment  1. Dysphagia, unspecified type    2. Gastroesophageal reflux disease, unspecified whether esophagitis present    3. Hx of ongoing treatment with hormonal therapy        Plan:  -Continue lifestyle Modification for managing constipation.  -Continue Prilosec 40 mg at night for control of GERD.  -Will plan for EGD as having new onset dysphagia  -Will obtain LFTs as receiving hormone therapy.  Recommend repeating annually at least    Karon \"Jh\" was seen today for follow-up.    Diagnoses and all orders for this visit:    Dysphagia, unspecified type  -     EGD; Future    Gastroesophageal reflux disease, unspecified whether esophagitis present  -     EGD; Future    Hx of ongoing treatment with hormonal therapy               RTC: 1 year    Additional comments:          Thank you for

## 2024-10-15 ENCOUNTER — HOSPITAL ENCOUNTER (OUTPATIENT)
Dept: PREADMISSION TESTING | Age: 29
Setting detail: OUTPATIENT SURGERY
Discharge: HOME | End: 2024-10-19
Payer: COMMERCIAL

## 2024-10-15 VITALS — WEIGHT: 200 LBS | BODY MASS INDEX: 36.8 KG/M2 | HEIGHT: 62 IN

## 2024-10-15 NOTE — PROGRESS NOTES
Pre-op Instructions For Out-Patient Endoscopy Surgery    Medication Instructions:  Please stop herbs and any supplements now (includes vitamins and minerals).    Please contact your surgeon and prescribing physician for pre-op instructions for any blood thinners.    If you have inhalers/aerosol treatments at home, please use them the morning of your surgery and bring the inhalers with you to the hospital.    Please take the following medications the morning of your surgery with a sip of water:    Atenolol, synthroid    Surgery Instructions:  After midnight before surgery:  Do not eat or drink anything, including water, mints, gum, and hard candy.  You may brush your teeth without swallowing.  No smoking, chewing tobacco, or street drugs.    Please shower or bathe before surgery.       Please do not wear any cologne, lotion, powder, jewelry, piercings, perfume, makeup, nail polish, hair accessories, or hair spray on the day of surgery.  Wear loose comfortable clothing.    Leave your valuables at home but bring a payment source for any after-surgery prescriptions you plan to fill at [facility].  Bring a storage case for any glasses/contacts.    An adult who is responsible for you MUST drive you home and should be with you for the first 24 hours after surgery.     The Day of Surgery:  Arrive at [facility] Surgery Entrance at the time directed by your surgeon and check in at the desk.     If you have a living will or healthcare power of , please bring a copy.    You will be taken to the pre-op holding area where you will be prepared for surgery.  A physical assessment will be performed by a nurse practitioner or house officer.  Your IV will be started and you will meet your anesthesiologist.    When you go to surgery, your family will be directed to the surgical waiting room, where the doctor should speak with them after your surgery.    After surgery, you will be taken to the recovery

## 2024-10-16 NOTE — PRE-PROCEDURE INSTRUCTIONS
Have you received your Prep? Any questions with prep instructions? N/a  Only Clear Liquid Diet day before.n/a  Nothing to eat after midnight day before procedure.yes  Are you taking any blood thinners? If so, you need to Stop.n/a  Remove any jewelry and body piercings.yes  Do you wear glasses? If so, please bring a case to store them in.  Are you having any Covid symptoms?no  Do you have any new rashes, infections, etc. that we should be aware of?no  Do you have a ride home the day of surgery? It cannot be a cab or medical transportation.yes  Verify surgery time/date and what time to arrive at hospital. 1000/1200

## 2024-10-17 ENCOUNTER — ANESTHESIA EVENT (OUTPATIENT)
Dept: ENDOSCOPY | Age: 29
End: 2024-10-17
Payer: COMMERCIAL

## 2024-10-18 ENCOUNTER — ANESTHESIA (OUTPATIENT)
Dept: ENDOSCOPY | Age: 29
End: 2024-10-18
Payer: COMMERCIAL

## 2024-10-18 ENCOUNTER — HOSPITAL ENCOUNTER (OUTPATIENT)
Age: 29
Setting detail: OUTPATIENT SURGERY
Discharge: HOME | End: 2024-10-18
Attending: INTERNAL MEDICINE | Admitting: INTERNAL MEDICINE
Payer: COMMERCIAL

## 2024-10-18 VITALS
DIASTOLIC BLOOD PRESSURE: 90 MMHG | HEART RATE: 101 BPM | SYSTOLIC BLOOD PRESSURE: 130 MMHG | BODY MASS INDEX: 36.8 KG/M2 | HEIGHT: 62 IN | TEMPERATURE: 98.2 F | OXYGEN SATURATION: 100 % | WEIGHT: 200 LBS | RESPIRATION RATE: 22 BRPM

## 2024-10-18 DIAGNOSIS — K21.9 GASTROESOPHAGEAL REFLUX DISEASE, UNSPECIFIED WHETHER ESOPHAGITIS PRESENT: ICD-10-CM

## 2024-10-18 DIAGNOSIS — R13.10 DYSPHAGIA, UNSPECIFIED TYPE: ICD-10-CM

## 2024-10-18 PROCEDURE — 43239 EGD BIOPSY SINGLE/MULTIPLE: CPT | Performed by: INTERNAL MEDICINE

## 2024-10-18 PROCEDURE — 88342 IMHCHEM/IMCYTCHM 1ST ANTB: CPT

## 2024-10-18 PROCEDURE — 3609012400 HC EGD TRANSORAL BIOPSY SINGLE/MULTIPLE: Performed by: INTERNAL MEDICINE

## 2024-10-18 PROCEDURE — 6360000002 HC RX W HCPCS: Performed by: NURSE ANESTHETIST, CERTIFIED REGISTERED

## 2024-10-18 PROCEDURE — 3700000001 HC ADD 15 MINUTES (ANESTHESIA): Performed by: INTERNAL MEDICINE

## 2024-10-18 PROCEDURE — 7100000010 HC PHASE II RECOVERY - FIRST 15 MIN: Performed by: INTERNAL MEDICINE

## 2024-10-18 PROCEDURE — 3700000000 HC ANESTHESIA ATTENDED CARE: Performed by: INTERNAL MEDICINE

## 2024-10-18 PROCEDURE — 2580000003 HC RX 258: Performed by: ANESTHESIOLOGY

## 2024-10-18 PROCEDURE — 88305 TISSUE EXAM BY PATHOLOGIST: CPT

## 2024-10-18 PROCEDURE — 7100000011 HC PHASE II RECOVERY - ADDTL 15 MIN: Performed by: INTERNAL MEDICINE

## 2024-10-18 PROCEDURE — 2500000003 HC RX 250 WO HCPCS: Performed by: NURSE ANESTHETIST, CERTIFIED REGISTERED

## 2024-10-18 PROCEDURE — 2709999900 HC NON-CHARGEABLE SUPPLY: Performed by: INTERNAL MEDICINE

## 2024-10-18 PROCEDURE — 81025 URINE PREGNANCY TEST: CPT

## 2024-10-18 RX ORDER — LIDOCAINE HYDROCHLORIDE 20 MG/ML
INJECTION, SOLUTION EPIDURAL; INFILTRATION; INTRACAUDAL; PERINEURAL
Status: DISCONTINUED | OUTPATIENT
Start: 2024-10-18 | End: 2024-10-18 | Stop reason: SDUPTHER

## 2024-10-18 RX ORDER — SODIUM CHLORIDE, SODIUM LACTATE, POTASSIUM CHLORIDE, CALCIUM CHLORIDE 600; 310; 30; 20 MG/100ML; MG/100ML; MG/100ML; MG/100ML
INJECTION, SOLUTION INTRAVENOUS CONTINUOUS
Status: DISCONTINUED | OUTPATIENT
Start: 2024-10-18 | End: 2024-10-18 | Stop reason: HOSPADM

## 2024-10-18 RX ORDER — LIDOCAINE HYDROCHLORIDE 10 MG/ML
1 INJECTION, SOLUTION EPIDURAL; INFILTRATION; INTRACAUDAL; PERINEURAL
Status: DISCONTINUED | OUTPATIENT
Start: 2024-10-18 | End: 2024-10-18 | Stop reason: HOSPADM

## 2024-10-18 RX ORDER — MIDAZOLAM HYDROCHLORIDE 1 MG/ML
INJECTION INTRAMUSCULAR; INTRAVENOUS
Status: DISCONTINUED | OUTPATIENT
Start: 2024-10-18 | End: 2024-10-18 | Stop reason: SDUPTHER

## 2024-10-18 RX ORDER — SODIUM CHLORIDE 0.9 % (FLUSH) 0.9 %
5-40 SYRINGE (ML) INJECTION PRN
Status: DISCONTINUED | OUTPATIENT
Start: 2024-10-18 | End: 2024-10-18 | Stop reason: HOSPADM

## 2024-10-18 RX ORDER — SODIUM CHLORIDE 0.9 % (FLUSH) 0.9 %
5-40 SYRINGE (ML) INJECTION EVERY 12 HOURS SCHEDULED
Status: DISCONTINUED | OUTPATIENT
Start: 2024-10-18 | End: 2024-10-18 | Stop reason: HOSPADM

## 2024-10-18 RX ORDER — PROPOFOL 10 MG/ML
INJECTION, EMULSION INTRAVENOUS
Status: DISCONTINUED | OUTPATIENT
Start: 2024-10-18 | End: 2024-10-18 | Stop reason: SDUPTHER

## 2024-10-18 RX ORDER — GLYCOPYRROLATE 0.2 MG/ML
INJECTION INTRAMUSCULAR; INTRAVENOUS
Status: DISCONTINUED | OUTPATIENT
Start: 2024-10-18 | End: 2024-10-18 | Stop reason: SDUPTHER

## 2024-10-18 RX ORDER — SODIUM CHLORIDE 9 MG/ML
INJECTION, SOLUTION INTRAVENOUS PRN
Status: DISCONTINUED | OUTPATIENT
Start: 2024-10-18 | End: 2024-10-18 | Stop reason: HOSPADM

## 2024-10-18 RX ADMIN — PROPOFOL 140 MG: 10 INJECTION, EMULSION INTRAVENOUS at 11:57

## 2024-10-18 RX ADMIN — PROPOFOL 50 MG: 10 INJECTION, EMULSION INTRAVENOUS at 12:00

## 2024-10-18 RX ADMIN — LIDOCAINE HYDROCHLORIDE 100 MG: 20 INJECTION, SOLUTION EPIDURAL; INFILTRATION; INTRACAUDAL; PERINEURAL at 11:56

## 2024-10-18 RX ADMIN — GLYCOPYRROLATE 0.2 MG: 0.2 INJECTION INTRAMUSCULAR; INTRAVENOUS at 11:51

## 2024-10-18 RX ADMIN — SODIUM CHLORIDE, POTASSIUM CHLORIDE, SODIUM LACTATE AND CALCIUM CHLORIDE: 600; 310; 30; 20 INJECTION, SOLUTION INTRAVENOUS at 11:51

## 2024-10-18 RX ADMIN — MIDAZOLAM 2 MG: 1 INJECTION INTRAMUSCULAR; INTRAVENOUS at 11:51

## 2024-10-18 ASSESSMENT — PAIN DESCRIPTION - DESCRIPTORS: DESCRIPTORS: DISCOMFORT

## 2024-10-18 ASSESSMENT — ENCOUNTER SYMPTOMS
ABDOMINAL PAIN: 0
SHORTNESS OF BREATH: 0
SINUS PRESSURE: 0
NAUSEA: 0

## 2024-10-18 ASSESSMENT — PAIN - FUNCTIONAL ASSESSMENT
PAIN_FUNCTIONAL_ASSESSMENT: 0-10
PAIN_FUNCTIONAL_ASSESSMENT: 0-10

## 2024-10-18 NOTE — ANESTHESIA PRE PROCEDURE
Department of Anesthesiology  Preprocedure Note       Name:  Karon Monroy   Age:  29 y.o.  :  1995                                          MRN:  [id number]         Date:  10/18/2024      Surgeon: Surgeon(s):  Mirian Tamez MD    Procedure: Procedure(s):  ESOPHAGOGASTRODUODENOSCOPY BIOPSY    Medications prior to admission:   Prior to Admission medications    Medication Sig Start Date End Date Taking? Authorizing Provider   amitriptyline (ELAVIL) 50 MG tablet Take 1 tablet by mouth nightly   Yes ProviderRenu MD   clobetasol (OLUX) 0.05 % foam Apply to affected regions of scalp on days that ketoconazole shampoo is used.  Do NOT use on face, groin, or armpits. 24  Yes Steven Buenrostro PA-C   cetirizine (ZYRTEC) 10 MG tablet TAKE 1 TABLET BY MOUTH ONCE DAILY 24  Yes Latosha Lucero APRN - NP   omeprazole (PRILOSEC) 40 MG delayed release capsule Take 1 capsule by mouth at bedtime 4/8/24 10/18/24 Yes Mirian Tamez MD   atenolol (TENORMIN) 25 MG tablet Take 1 tablet by mouth daily   Yes Renu Armas MD   fluticasone (FLONASE) 50 MCG/ACT nasal spray INSTILL 2 SPRAYS INTO EACH NOSTRIL ONCE DAILY 24  Yes Latosha Lucero APRN - NP   tiZANidine (ZANAFLEX) 2 MG tablet 1 tablet as needed Orally Q HS for 30 days   Yes Renu Armas MD   ketoconazole (NIZORAL) 2 % cream Apply to affected area BID PRN flares.  Combine with triamcinolone 0.025% cream when using. 23  Yes Steven Buenrostro PA-C   triamcinolone (KENALOG) 0.025 % cream Apply to rash on face twice daily, as needed, for itching/scaling. 23  Yes Steven Buenrostro PA-C   hydrocortisone (ANUSOL-HC) 2.5 % CREA rectal cream Prn hemorrhoid symptoms x 7 nights. 23  Yes Yumiko Leal MD   ketoconazole (NIZORAL) 2 % shampoo Apply 3-4 times weekly to scalp, leave on for five minutes prior to washing off 4/3/23  Yes Steven Buenrostro PA-C   triamcinolone (KENALOG) 0.1 % cream Apply to rash twice daily (not face, armpit or

## 2024-10-18 NOTE — H&P
HISTORY and PHYSICAL  [facility]       NAME:  Karon Monroy  MRN: [id number]   YOB: 1995   Date: 10/18/2024   Age: 29 y.o.  Gender: adult       COMPLAINT AND PRESENT HISTORY:                 Karon Monroy is 29 y.o., adult, undergoing for EGD BIOPSY.  ESOPHAGOGASTRODUODENOSCOPY.    Pt is being seen for hx of:  Pre-Op Diagnosis Codes:      * Dysphagia, unspecified type     * Gastroesophageal reflux disease, unspecified whether esophagitis present     Patient has had previous endoscopies done last in July 2022      Patient denies any heartburn, indigestion, acid reflux (GERD).  Pt is taking Prilosec.     Pt admits to intermittent  dysphagia . No  regurgitation.     Pt admits to random epigastric pain, nausea or  vomiting.  No changes in appetite.   Pt  denies  changes in bowel habits.  Denies any  blood in stools, or tarry stools.      Medical history reviewed: Concussion in 2022-  pt denies any headaches or sx, tachycardia   Patient voices feeling well today. Denies any recent fever or chills, chest pain /pressure . Pt reports denies SOB..     Hx of smoking :  no    Patient has been NPO since midnight. No blood thinners in the past 5-7 days. Pt took synthroid and Atenolol is taken in the evening.     Pt denies any issues with Anesthesia.       RECENT LABS, IMAGING AND TESTING     Lab Results   Component Value Date    WBC 8.09 08/26/2024    RBC 5.47 08/26/2024    HGB 16.0 08/26/2024    HCT 49.2 08/26/2024    MCV 89.9 08/26/2024    MCH 29.3 08/26/2024    MCHC 32.5 08/26/2024    RDW 43.0 03/10/2022     08/26/2024    MPV 8.1 02/07/2013        Lab Results   Component Value Date     08/26/2024    K 4.8 08/26/2024     08/26/2024    CO2 27 08/26/2024    BUN 12 08/26/2024    CREATININE 1.07 08/26/2024    GLUCOSE 94 08/26/2024    CALCIUM 9.3 08/26/2024    BILITOT 0.5 08/26/2024    ALKPHOS 91 08/26/2024    AST 28 08/26/2024    ALT 42 08/26/2024         PAST  [facility] Safety & Environment   Housing Stability: Unknown (5/30/2024)    Housing Stability Vital Sign     Unstable Housing in the Last Year: No           REVIEW OF SYSTEMS      Allergies   Allergen Reactions    Bupropion      Unknown Reaction.    Lunesta [Eszopiclone] Itching    Adhesive Tape Dermatitis and Rash       No current facility-administered medications on file prior to encounter.     Current Outpatient Medications on File Prior to Encounter   Medication Sig Dispense Refill    amitriptyline (ELAVIL) 50 MG tablet Take 1 tablet by mouth nightly      clobetasol (OLUX) 0.05 % foam Apply to affected regions of scalp on days that ketoconazole shampoo is used.  Do NOT use on face, groin, or armpits. 60 g 1    cetirizine (ZYRTEC) 10 MG tablet TAKE 1 TABLET BY MOUTH ONCE DAILY 90 tablet 1    omeprazole (PRILOSEC) 40 MG delayed release capsule Take 1 capsule by mouth at bedtime 90 capsule 2    atenolol (TENORMIN) 25 MG tablet Take 1 tablet by mouth daily      fluticasone (FLONASE) 50 MCG/ACT nasal spray INSTILL 2 SPRAYS INTO EACH NOSTRIL ONCE DAILY 48 g 5    tiZANidine (ZANAFLEX) 2 MG tablet 1 tablet as needed Orally Q HS for 30 days      ketoconazole (NIZORAL) 2 % cream Apply to affected area BID PRN flares.  Combine with triamcinolone 0.025% cream when using. 60 g 2    triamcinolone (KENALOG) 0.025 % cream Apply to rash on face twice daily, as needed, for itching/scaling. 80 g 2    hydrocortisone (ANUSOL-HC) 2.5 % CREA rectal cream Prn hemorrhoid symptoms x 7 nights. 28 g 1    ketoconazole (NIZORAL) 2 % shampoo Apply 3-4 times weekly to scalp, leave on for five minutes prior to washing off 120 mL 2    triamcinolone (KENALOG) 0.1 % cream Apply to rash twice daily (not face, armpit or groin) 80 g 1    B-D 3CC LUER-LENA SYR 93ND7-3/2 22G X 1-1/2\" 3 ML MISC       busPIRone (BUSPAR) 10 MG tablet Patient takes 2 tablets at one time to equal 20 mg      vitamin D

## 2024-10-18 NOTE — DISCHARGE INSTRUCTIONS
Sedation or General Anesthesia, Adult  Care After  Refer to this sheet in the next 24 hours. These instructions provide you with information on caring for yourself after your procedure. Your caregiver may also give you more specific instructions. Your treatment has been planned according to current medical practices, but problems sometimes occur. Call your caregiver if you have any problems or questions after your procedure.   HOME CARE INSTRUCTIONS   Do not participate in any activities that require you to be alert or coordinated. Do not:  Drive.  Swim.  Ride a bicycle.  Operate heavy machinery.  Cook.  Use power tools.  Climb ladders.  Work at heights.  Take a bath.  Do not drink alcohol.  Do not make any important decisions or sign legal documents.  Stay with an adult.  The first meal following your procedure should be light and small. Avoid solid foods if you feel sick to your stomach (nauseous) or if you throw up (vomit).  Drink enough fluids to keep your urine clear or pale yellow.  Only take your usual medicines or new medicines if your caregiver approves them.  Only take over-the-counter or prescription medicines for pain, discomfort, or fever as directed by your caregiver.  Keep all follow-up appointments as directed by your caregiver.  SEEK IMMEDIATE MEDICAL CARE IF:   You are not feeling normal or behaving normally after 24 hours.  You have persistent nausea and vomiting.  You are unable to drink fluids or eat food.  You have difficulty urinating.  You have difficulty breathing or speaking.  You have blue or gray skin.  There is difficulty waking or you cannot be woken up.  You have heavy bleeding, redness, or a lot of swelling where the sedative or anesthesia entered your skin (intravenous site).  You have a rash.  MAKE SURE YOU:  Understand these instructions.  Will watch your condition.  Will get help right away if you are not doing well or get worse.  Document Released: 12/18/2006 Document Revised:  with you either the day of surgery or the following day.   Call Your Doctor   After arriving home, contact your doctor if any of the following occurs:   Signs of infection, including fever and chills   Redness, swelling, increasing pain, excessive bleeding, or discharge from the rectum (Up to cup of blood per day can be expected for up to 3-4 days following your polypectomy.)   Black, tarry stools   Severe abdominal pain   Hard, swollen abdomen   Inability to pass gas or stool   Cough , shortness of breath, chest pain, or severe nausea or vomiting   New, unexplained symptoms   In case of emergency,  CALL 911  .     Last Reviewed: December 2010 Rod Page MD   Updated: 4/7/2011

## 2024-10-18 NOTE — ANESTHESIA POSTPROCEDURE EVALUATION
Department of Anesthesiology  Postprocedure Note    Patient: Karon Monroy  MRN: [id number]  YOB: 1995  Date of evaluation: 10/18/2024    Procedure Summary       Date: 10/18/24 Room / Location: [place] [address] / [facility]    Anesthesia Start: 1151 Anesthesia Stop: 1216    Procedure: ESOPHAGOGASTRODUODENOSCOPY BIOPSY (Esophagus) Diagnosis:       Dysphagia, unspecified type      Gastroesophageal reflux disease, unspecified whether esophagitis present      (Dysphagia, unspecified type [R13.10])      (Gastroesophageal reflux disease, unspecified whether esophagitis present [K21.9])    Surgeons: Mirian Tamez MD Responsible Provider: Royal Sultana MD    Anesthesia Type: general, TIVA ASA Status: 2            Anesthesia Type: No value filed.    Aleksandr Phase I: Aleksandr Score: 10    Aleksandr Phase II: Aleksandr Score: 10    Anesthesia Post Evaluation    Comments: POST- ANESTHESIA EVALUATION       Pt Name: Karon Monroy  MRN: [id number]  YOB: 1995  Date of evaluation: 10/18/2024  Time:  2:36 PM      BP (!) 130/90   Pulse (!) 101   Temp 98.2 °F (36.8 °C) (Infrared)   Resp 22   Ht 1.575 m (5' 2.01\")   Wt 90.7 kg (200 lb)   SpO2 100%   BMI 36.57 kg/m²      Consciousness Level  Awake  Cardiopulmonary Status  Stable  Pain Adequately Treated YES  Nausea / Vomiting  NO  Adequate Hydration  YES  Anesthesia Related Complications NONE      Electronically signed by Royal Sultana MD on 10/18/2024 at 2:36 PM           No notable events documented.

## 2024-10-18 NOTE — OP NOTE
EGD report    Esophagogastroduodenoscopy (EGD) Procedure Note    Procedure:  EGD with biopsy    Procedure Date: 9/18/2024    Indications: Dysphagia    Sedation: MAC    Attending Physician:  Dr. Mirian Tamez MD    Assistant:  None    Procedure Details:    Informed consent was obtained for the procedure, including sedation. Risks of infection, perforation, hemorrhage, adverse drug reaction, and aspiration were discussed. The patient was placed in the left lateral decubitus position. The patient was monitored continuously with ECG tracing, pulse oximetry, blood pressure monitoring, and direct observation.      The gastroscope was inserted into the mouth and advanced under direct vision to second portion of the duodenum.  A careful inspection was made as the gastroscope was withdrawn, including a retroflexed view of the proximal stomach; findings and interventions are described below. Appropriate photodocumentation was obtained.    Findings:  Retropharyngeal area was grossly normal appearing     Esophagus: normal, proximal and distal esophageal biopsies obtained.  No obvious stricture noted                          Esophagogastric markings: Diaphragmatic hiatus-37 cm; GE junction-37 cm     Stomach:    Fundus: normal    Body: 2 polyps measuring 2 mm each noted, taken out using biopsy forceps.    Antrum: normal  Biopsies obtained to rule out H. pylori     Duodenum:     Bulb: normal    First part: Normal    Second Part: Normal      Complications:  None           Estimated blood loss: Minimal    Disposition: Home           Condition: Stable    Specimen Removed: Proximal plus distal esophagus, stomach, stomach polyp    Impression:    No significant stenosis/stricture noted in the esophagus.  Proximal and distal esophageal biopsies obtained.  2 polyps, 2 mm each noted, resected using biopsy forceps.  Normal stomach otherwise, biopsies obtained to rule out H. pylori.    Recommendations:  Follow pathology results.  Avoid

## 2024-10-22 LAB — SURGICAL PATHOLOGY REPORT: NORMAL

## 2024-11-16 NOTE — PROGRESS NOTES
ESOPHAGOGASTRODUODENOSCOPY BIOPSY performed by Mirian Tamez MD at [facility] ENDO    WISDOM TOOTH EXTRACTION       Obstetric History:   0  Para 0  Gynecologic History: LMP no bleeding with Nexplanon   Menarche [age]    Last Pap: 23       Any history of abnormal paps no    PriorColpo/Biopsy n/a     Last Mammogram n/a  Contraception: none  Complications: none  STDs: none  Psychosocial History: Occupation:   Medical billing and coding   Caffeine No    At risk for depression Yes    Abuse:   Mental abuse  Seatbelt:   Yes  Exercise:  No    Social History     Socioeconomic History    Marital status: Single     Spouse name: Not on file    Number of children: Not on file    Years of education: Not on file    Highest education level: Not on file   Occupational History    Not on file   Tobacco Use    Smoking status: Never    Smokeless tobacco: Never   Vaping Use    Vaping status: Never Used   Substance and Sexual Activity    Alcohol use: Yes     Comment: Social drinker    Drug use: Yes     Types: Marijuana (Weed)     Comment: Socially    Sexual activity: Yes     Partners: Female, Male   Other Topics Concern    Not on file   Social History Narrative    Not on file     Social Determinants of Health     Financial Resource Strain: Low Risk  (2024)    Overall Financial Resource Strain (CARDIA)     Difficulty of Paying Living Expenses: Not hard at all   Food Insecurity: No Food Insecurity (2024)    Hunger Vital Sign     Worried About Running Out of Food in the Last Year: Never true     Ran Out of Food in the Last Year: Never true   Transportation Needs: Unknown (2024)    PRAPARE - Transportation     Lack of Transportation (Medical): Not on file     Lack of Transportation (Non-Medical): No   Physical Activity: Not on file   Stress: Not on file   Social Connections: Not on file   Intimate Partner Violence: Unknown (2024)    [facility]    [employer]

## 2024-11-19 ENCOUNTER — OFFICE VISIT (OUTPATIENT)
Dept: OBGYN CLINIC | Age: 29
End: 2024-11-19
Payer: COMMERCIAL

## 2024-11-19 VITALS
HEART RATE: 94 BPM | SYSTOLIC BLOOD PRESSURE: 123 MMHG | BODY MASS INDEX: 38.46 KG/M2 | WEIGHT: 209 LBS | DIASTOLIC BLOOD PRESSURE: 82 MMHG | HEIGHT: 62 IN

## 2024-11-19 DIAGNOSIS — Z01.419 ENCOUNTER FOR GYNECOLOGICAL EXAMINATION: Primary | ICD-10-CM

## 2024-11-19 PROCEDURE — G8484 FLU IMMUNIZE NO ADMIN: HCPCS | Performed by: OBSTETRICS & GYNECOLOGY

## 2024-11-19 PROCEDURE — 99395 PREV VISIT EST AGE 18-39: CPT | Performed by: OBSTETRICS & GYNECOLOGY

## 2024-11-19 PROCEDURE — 99459 PELVIC EXAMINATION: CPT | Performed by: OBSTETRICS & GYNECOLOGY

## 2024-11-19 ASSESSMENT — ENCOUNTER SYMPTOMS
SHORTNESS OF BREATH: 0
COUGH: 0
ABDOMINAL PAIN: 0

## 2024-12-13 DIAGNOSIS — J30.1 SEASONAL ALLERGIC RHINITIS DUE TO POLLEN: ICD-10-CM

## 2024-12-13 NOTE — TELEPHONE ENCOUNTER
Last visit: 08/14/2024  Last Med refill: 07/11/2024  Does patient have enough medication for 72 hours: Yes    Next Visit Date:  Future Appointments   Date Time Provider Department Center   1/22/2025  1:15 PM Kacie Seth MD [facility]   2/14/2025  2:30 PM Latosha Lucero APRN - [facility] ECC DEP   8/28/2025  2:00 PM Steven Buenrostro PA-C  derm [facility]       Health Maintenance   Topic Date Due    Varicella vaccine (1 of 2 - 13+ 2-dose series) Never done    Hepatitis B vaccine (3 of 3 - 19+ 3-dose series) 07/11/2021    Flu vaccine (1) 08/01/2024    Depression Monitoring  02/07/2025    Pap smear  08/01/2025    A1C test (Diabetic or Prediabetic)  08/26/2025    Lipids  08/26/2025    DTaP/Tdap/Td vaccine (2 - Td or Tdap) 05/30/2034    Hepatitis A vaccine  Completed    COVID-19 Vaccine  Completed    Hepatitis C screen  Completed    HIV screen  Completed    Hib vaccine  Aged Out    HPV vaccine  Aged Out    Polio vaccine  Aged Out    Meningococcal (ACWY) vaccine  Aged Out    Pneumococcal 0-64 years Vaccine  Aged Out    Chlamydia/GC screen  Discontinued       Hemoglobin A1C (%)   Date Value   08/26/2024 5.8   08/26/2024 5.8             ( goal A1C is < 7)   No components found for: \"LABMICR\"  No components found for: \"LDLCHOLESTEROL\", \"LDLCALC\"    (goal LDL is <100)   AST (U/L)   Date Value   08/26/2024 28     ALT (U/L)   Date Value   08/26/2024 42     BUN (mg/dL)   Date Value   08/26/2024 12     BP Readings from Last 3 Encounters:   11/19/24 123/82   10/18/24 (!) 130/90   10/07/24 125/80          (goal 120/80)    All Future Testing planned in [facility]  Lab Frequency Next Occurrence   Hepatic Function Panel Once 04/08/2024   XR CHEST (2 VW) Once 05/30/2024   Basic Metabolic Panel Once 08/14/2024   EGD Once 01/07/2025               Patient Active Problem List:     Depression with anxiety     Excessive daytime sleepiness     Flat feet, bilateral     Pain in both feet     Hypothyroidism     Vitamin D

## 2024-12-16 RX ORDER — CETIRIZINE HYDROCHLORIDE 10 MG/1
10 TABLET ORAL DAILY
Qty: 90 TABLET | Refills: 1 | Status: SHIPPED | OUTPATIENT
Start: 2024-12-16

## 2025-01-13 RX ORDER — FLUTICASONE PROPIONATE 50 MCG
SPRAY, SUSPENSION (ML) NASAL
Qty: 48 G | Refills: 5 | Status: SHIPPED | OUTPATIENT
Start: 2025-01-13

## 2025-01-13 NOTE — TELEPHONE ENCOUNTER
Last visit: 8/14/24  Last Med refill: 1/8/24  Does patient have enough medication for 72 hours: No    Next Visit Date:  Future Appointments   Date Time Provider Department Center   1/22/2025  1:15 PM Kacie Seth MD [facility]   2/14/2025  2:30 PM Latosha Lucero APRN - NP [facility] ECC DEP   8/28/2025  2:00 PM Steven Buenrostro PA-C [facility]       Health Maintenance   Topic Date Due    Varicella vaccine (1 of 2 - 13+ 2-dose series) Never done    Hepatitis B vaccine (3 of 3 - 19+ 3-dose series) 07/11/2021    Flu vaccine (1) 08/01/2024    Depression Monitoring  02/07/2025    Pap smear  08/01/2025    A1C test (Diabetic or Prediabetic)  08/26/2025    Lipids  08/26/2025    DTaP/Tdap/Td vaccine (2 - Td or Tdap) 05/30/2034    Hepatitis A vaccine  Completed    COVID-19 Vaccine  Completed    Hepatitis C screen  Completed    HIV screen  Completed    Hib vaccine  Aged Out    HPV vaccine  Aged Out    Polio vaccine  Aged Out    Meningococcal (ACWY) vaccine  Aged Out    Pneumococcal 0-64 years Vaccine  Aged Out    Chlamydia/GC screen  Discontinued       Hemoglobin A1C (%)   Date Value   08/26/2024 5.8   08/26/2024 5.8             ( goal A1C is < 7)   No components found for: \"LABMICR\"  No components found for: \"LDLCHOLESTEROL\", \"LDLCALC\"    (goal LDL is <100)   AST (U/L)   Date Value   08/26/2024 28     ALT (U/L)   Date Value   08/26/2024 42     BUN (mg/dL)   Date Value   08/26/2024 12     BP Readings from Last 3 Encounters:   11/19/24 123/82   10/18/24 (!) 130/90   10/07/24 125/80          (goal 120/80)    All Future Testing planned in [facility]  Lab Frequency Next Occurrence   Hepatic Function Panel Once 04/08/2024   XR CHEST (2 VW) Once 05/30/2024   Basic Metabolic Panel Once 08/14/2024   EGD Once 01/07/2025               Patient Active Problem List:     Depression with anxiety     Excessive daytime sleepiness     Flat feet, bilateral     Pain in both feet     Hypothyroidism     Vitamin D

## 2025-01-22 ENCOUNTER — INITIAL CONSULT (OUTPATIENT)
Dept: OBGYN CLINIC | Age: 30
End: 2025-01-22
Payer: COMMERCIAL

## 2025-01-22 VITALS
HEART RATE: 90 BPM | HEIGHT: 62 IN | BODY MASS INDEX: 37.54 KG/M2 | WEIGHT: 204 LBS | SYSTOLIC BLOOD PRESSURE: 128 MMHG | DIASTOLIC BLOOD PRESSURE: 78 MMHG

## 2025-01-22 DIAGNOSIS — E28.2 PCOS (POLYCYSTIC OVARIAN SYNDROME): Primary | ICD-10-CM

## 2025-01-22 DIAGNOSIS — N92.0 MENORRHAGIA WITH REGULAR CYCLE: ICD-10-CM

## 2025-01-22 PROCEDURE — 99213 OFFICE O/P EST LOW 20 MIN: CPT | Performed by: OBSTETRICS & GYNECOLOGY

## 2025-01-22 PROCEDURE — 1036F TOBACCO NON-USER: CPT | Performed by: OBSTETRICS & GYNECOLOGY

## 2025-01-22 PROCEDURE — G8428 CUR MEDS NOT DOCUMENT: HCPCS | Performed by: OBSTETRICS & GYNECOLOGY

## 2025-01-22 PROCEDURE — G8417 CALC BMI ABV UP PARAM F/U: HCPCS | Performed by: OBSTETRICS & GYNECOLOGY

## 2025-01-22 ASSESSMENT — ENCOUNTER SYMPTOMS
ABDOMINAL PAIN: 0
COUGH: 0
SHORTNESS OF BREATH: 0

## 2025-01-22 NOTE — PROGRESS NOTES
Discussed patient with Dr Heather Chamberlain from ethics.      2. Menorrhagia with regular cycle  - Discussed that since he is on testosterone to masculinize himself that we don't want to disrupt that with hormone control.   - Discussed that if we were to use an ablation as a way to control his periods that it only lasts for 7-10 years and his bleeding would likely return.      3. Obesity  - Pt is at increased risk for uterine cancer with the diagnosis of obesity.      Pt to follow up for ultrasound    MD [facility]

## 2025-01-23 ENCOUNTER — PREP FOR PROCEDURE (OUTPATIENT)
Dept: OBGYN CLINIC | Age: 30
End: 2025-01-23

## 2025-01-23 DIAGNOSIS — E28.2 PCOS (POLYCYSTIC OVARIAN SYNDROME): Primary | ICD-10-CM

## 2025-01-23 DIAGNOSIS — N92.0 MENORRHAGIA WITH REGULAR CYCLE: ICD-10-CM

## 2025-01-23 DIAGNOSIS — E66.812 CLASS 2 OBESITY IN ADULT, UNSPECIFIED BMI, UNSPECIFIED OBESITY TYPE, UNSPECIFIED WHETHER SERIOUS COMORBIDITY PRESENT: ICD-10-CM

## 2025-01-23 PROBLEM — E66.9 OBESITY: Status: ACTIVE | Noted: 2025-01-23

## 2025-01-24 RX ORDER — SCOPOLAMINE 1 MG/3D
1 PATCH, EXTENDED RELEASE TRANSDERMAL
Status: CANCELLED | OUTPATIENT
Start: 2025-01-24

## 2025-02-05 NOTE — CONSULTS
Clinical Ethics Consultation Note    ERD Advisement    Ethics consulted. Writer reviewed chart and spoke with physician. Physician is treating a medical condition of persistent heavy bleeding. Physician states that medical management is contra-indicated for this patient and that the next level intervention to address bleeding would be ablation and then hysterectomy. Patient is at higher risk for uterine cancer with the diagnosis of PCOS. Per physician, if an ablation were performed it would make sampling the uterus in the future difficult and uterine cancer could potentially be undetected for a longer period of time than normal.” Writer finds no ethical barrier to performing a hysterectomy to address the medical condition.     Thank you for contacting ethics,     Please call for additional questions,     Heather Chamberlain  [phone]

## 2025-02-13 DIAGNOSIS — K21.9 GASTROESOPHAGEAL REFLUX DISEASE, UNSPECIFIED WHETHER ESOPHAGITIS PRESENT: Primary | ICD-10-CM

## 2025-02-13 RX ORDER — OMEPRAZOLE 40 MG/1
40 CAPSULE, DELAYED RELEASE ORAL NIGHTLY
Qty: 30 CAPSULE | Refills: 2 | Status: SHIPPED | OUTPATIENT
Start: 2025-02-13 | End: 2025-02-14

## 2025-02-13 ASSESSMENT — PATIENT HEALTH QUESTIONNAIRE - PHQ9
SUM OF ALL RESPONSES TO PHQ QUESTIONS 1-9: 3
6. FEELING BAD ABOUT YOURSELF - OR THAT YOU ARE A FAILURE OR HAVE LET YOURSELF OR YOUR FAMILY DOWN: NOT AT ALL
SUM OF ALL RESPONSES TO PHQ QUESTIONS 1-9: 3
4. FEELING TIRED OR HAVING LITTLE ENERGY: MORE THAN HALF THE DAYS
SUM OF ALL RESPONSES TO PHQ QUESTIONS 1-9: 3
2. FEELING DOWN, DEPRESSED OR HOPELESS: NOT AT ALL
2. FEELING DOWN, DEPRESSED OR HOPELESS: NOT AT ALL
10. IF YOU CHECKED OFF ANY PROBLEMS, HOW DIFFICULT HAVE THESE PROBLEMS MADE IT FOR YOU TO DO YOUR WORK, TAKE CARE OF THINGS AT HOME, OR GET ALONG WITH OTHER PEOPLE: SOMEWHAT DIFFICULT
7. TROUBLE CONCENTRATING ON THINGS, SUCH AS READING THE NEWSPAPER OR WATCHING TELEVISION: NOT AT ALL
5. POOR APPETITE OR OVEREATING: NOT AT ALL
SUM OF ALL RESPONSES TO PHQ QUESTIONS 1-9: 3
SUM OF ALL RESPONSES TO PHQ QUESTIONS 1-9: 3
8. MOVING OR SPEAKING SO SLOWLY THAT OTHER PEOPLE COULD HAVE NOTICED. OR THE OPPOSITE, BEING SO FIGETY OR RESTLESS THAT YOU HAVE BEEN MOVING AROUND A LOT MORE THAN USUAL: NOT AT ALL
1. LITTLE INTEREST OR PLEASURE IN DOING THINGS: NOT AT ALL
SUM OF ALL RESPONSES TO PHQ9 QUESTIONS 1 & 2: 0
3. TROUBLE FALLING OR STAYING ASLEEP: SEVERAL DAYS
4. FEELING TIRED OR HAVING LITTLE ENERGY: MORE THAN HALF THE DAYS
9. THOUGHTS THAT YOU WOULD BE BETTER OFF DEAD, OR OF HURTING YOURSELF: NOT AT ALL
1. LITTLE INTEREST OR PLEASURE IN DOING THINGS: NOT AT ALL
10. IF YOU CHECKED OFF ANY PROBLEMS, HOW DIFFICULT HAVE THESE PROBLEMS MADE IT FOR YOU TO DO YOUR WORK, TAKE CARE OF THINGS AT HOME, OR GET ALONG WITH OTHER PEOPLE: SOMEWHAT DIFFICULT
6. FEELING BAD ABOUT YOURSELF - OR THAT YOU ARE A FAILURE OR HAVE LET YOURSELF OR YOUR FAMILY DOWN: NOT AT ALL
8. MOVING OR SPEAKING SO SLOWLY THAT OTHER PEOPLE COULD HAVE NOTICED. OR THE OPPOSITE - BEING SO FIDGETY OR RESTLESS THAT YOU HAVE BEEN MOVING AROUND A LOT MORE THAN USUAL: NOT AT ALL
7. TROUBLE CONCENTRATING ON THINGS, SUCH AS READING THE NEWSPAPER OR WATCHING TELEVISION: NOT AT ALL
5. POOR APPETITE OR OVEREATING: NOT AT ALL
9. THOUGHTS THAT YOU WOULD BE BETTER OFF DEAD, OR OF HURTING YOURSELF: NOT AT ALL
3. TROUBLE FALLING OR STAYING ASLEEP: SEVERAL DAYS

## 2025-02-14 ENCOUNTER — OFFICE VISIT (OUTPATIENT)
Dept: FAMILY MEDICINE CLINIC | Age: 30
End: 2025-02-14

## 2025-02-14 VITALS
HEART RATE: 84 BPM | HEIGHT: 62 IN | RESPIRATION RATE: 18 BRPM | DIASTOLIC BLOOD PRESSURE: 78 MMHG | WEIGHT: 202 LBS | SYSTOLIC BLOOD PRESSURE: 130 MMHG | BODY MASS INDEX: 37.17 KG/M2

## 2025-02-14 DIAGNOSIS — L21.9 SEBORRHEIC DERMATITIS: ICD-10-CM

## 2025-02-14 DIAGNOSIS — Z00.00 ENCOUNTER FOR WELL ADULT EXAM WITHOUT ABNORMAL FINDINGS: Primary | ICD-10-CM

## 2025-02-14 DIAGNOSIS — R73.03 PREDIABETES: ICD-10-CM

## 2025-02-14 LAB — HBA1C MFR BLD: 5.4 %

## 2025-02-14 RX ORDER — OMEPRAZOLE 40 MG/1
40 CAPSULE, DELAYED RELEASE ORAL NIGHTLY
Qty: 30 CAPSULE | Refills: 2 | Status: SHIPPED | OUTPATIENT
Start: 2025-02-14 | End: 2025-05-15

## 2025-02-14 RX ORDER — ACARBOSE 25 MG/1
25 TABLET ORAL
COMMUNITY

## 2025-02-14 RX ORDER — KETOCONAZOLE 20 MG/G
CREAM TOPICAL
Qty: 60 G | Refills: 2 | Status: SHIPPED | OUTPATIENT
Start: 2025-02-14

## 2025-02-14 SDOH — ECONOMIC STABILITY: FOOD INSECURITY: WITHIN THE PAST 12 MONTHS, YOU WORRIED THAT YOUR FOOD WOULD RUN OUT BEFORE YOU GOT MONEY TO BUY MORE.: NEVER TRUE

## 2025-02-14 SDOH — ECONOMIC STABILITY: FOOD INSECURITY: WITHIN THE PAST 12 MONTHS, THE FOOD YOU BOUGHT JUST DIDN'T LAST AND YOU DIDN'T HAVE MONEY TO GET MORE.: NEVER TRUE

## 2025-02-14 ASSESSMENT — ENCOUNTER SYMPTOMS
CHEST TIGHTNESS: 0
SHORTNESS OF BREATH: 0
ABDOMINAL PAIN: 0
VOMITING: 0
SINUS PRESSURE: 0
SINUS PAIN: 0
CONSTIPATION: 0
DIARRHEA: 0
COLOR CHANGE: 0
NAUSEA: 0
EYE PAIN: 0
BACK PAIN: 0

## 2025-02-14 NOTE — PROGRESS NOTES
Well Adult Note  Name: Karon Monroy Today’s Date: 2025   MRN: [id number] Sex: Adult   Age: 29 y.o. Ethnicity: Non- / Non    : 1995 Race: White (non-)      Karon Monroy is here for a well adult exam.       Assessment & Plan   Encounter for well adult exam without abnormal findings  Prediabetes  -     POCT glycosylated hemoglobin (Hb A1C)        Return in 6 months (on 2025).       Subjective   History:    Presents for annual exam - has physical form to be completed for employer     Planned complete hysterectomy with b/l oophorectomy 2025    Was referred to [facility] dietician through endocrinologist   A1C 5.4 today, which is down from 5.8 on 2024      Review of Systems   Constitutional:  Negative for activity change, chills, fatigue and unexpected weight change.   HENT:  Negative for hearing loss, postnasal drip, sinus pressure and sinus pain.    Eyes:  Negative for pain and visual disturbance.   Respiratory:  Negative for chest tightness and shortness of breath.    Cardiovascular:  Negative for chest pain and palpitations.   Gastrointestinal:  Negative for abdominal pain, constipation, diarrhea, nausea and vomiting.   Endocrine: Negative for polydipsia, polyphagia and polyuria.   Genitourinary:  Negative for dysuria, flank pain, frequency and urgency.   Musculoskeletal:  Negative for arthralgias, back pain and myalgias.   Skin:  Negative for color change and rash.   Neurological:  Negative for dizziness, weakness, light-headedness, numbness and headaches.   Psychiatric/Behavioral:  Negative for confusion, hallucinations, self-injury, sleep disturbance and suicidal ideas. The patient is not nervous/anxious.        Allergies   Allergen Reactions    Bupropion      Unknown Reaction.    Lunesta [Eszopiclone] Itching    Adhesive Tape Dermatitis and Rash     Prior to Visit Medications    Medication Sig Taking? Authorizing Provider   omeprazole

## 2025-02-19 ENCOUNTER — ANESTHESIA EVENT (OUTPATIENT)
Dept: OPERATING ROOM | Age: 30
End: 2025-02-19
Payer: COMMERCIAL

## 2025-02-20 ENCOUNTER — ANESTHESIA (OUTPATIENT)
Dept: OPERATING ROOM | Age: 30
End: 2025-02-20
Payer: COMMERCIAL

## 2025-02-20 ENCOUNTER — HOSPITAL ENCOUNTER (OUTPATIENT)
Age: 30
Setting detail: OUTPATIENT SURGERY
Discharge: HOME | End: 2025-02-20
Attending: OBSTETRICS & GYNECOLOGY | Admitting: OBSTETRICS & GYNECOLOGY
Payer: COMMERCIAL

## 2025-02-20 VITALS
WEIGHT: 202 LBS | SYSTOLIC BLOOD PRESSURE: 124 MMHG | HEART RATE: 117 BPM | BODY MASS INDEX: 37.17 KG/M2 | OXYGEN SATURATION: 98 % | TEMPERATURE: 97.7 F | RESPIRATION RATE: 17 BRPM | DIASTOLIC BLOOD PRESSURE: 84 MMHG | HEIGHT: 62 IN

## 2025-02-20 DIAGNOSIS — E28.2 PCOS (POLYCYSTIC OVARIAN SYNDROME): ICD-10-CM

## 2025-02-20 DIAGNOSIS — E66.9 OBESITY: ICD-10-CM

## 2025-02-20 DIAGNOSIS — G89.18 POST-OP PAIN: Primary | ICD-10-CM

## 2025-02-20 DIAGNOSIS — E66.812 CLASS 2 OBESITY IN ADULT, UNSPECIFIED BMI, UNSPECIFIED OBESITY TYPE, UNSPECIFIED WHETHER SERIOUS COMORBIDITY PRESENT: ICD-10-CM

## 2025-02-20 DIAGNOSIS — N92.0 MENORRHAGIA WITH REGULAR CYCLE: ICD-10-CM

## 2025-02-20 PROBLEM — Z90.710 H/O: HYSTERECTOMY: Status: ACTIVE | Noted: 2025-02-20

## 2025-02-20 LAB
ABO + RH BLD: NORMAL
ARM BAND NUMBER: NORMAL
BASOPHILS # BLD: 0.1 K/UL (ref 0–0.2)
BASOPHILS NFR BLD: 1 % (ref 0–2)
BLOOD BANK SAMPLE EXPIRATION: NORMAL
BLOOD GROUP ANTIBODIES SERPL: NEGATIVE
EOSINOPHIL # BLD: 0.18 K/UL (ref 0–0.44)
EOSINOPHILS RELATIVE PERCENT: 2 % (ref 1–4)
ERYTHROCYTE [DISTWIDTH] IN BLOOD BY AUTOMATED COUNT: 12.6 % (ref 11.8–14.4)
HCT VFR BLD AUTO: 50.7 % (ref 40.7–50.3)
HGB BLD-MCNC: 16.5 G/DL (ref 13–17)
IMM GRANULOCYTES # BLD AUTO: <0.03 K/UL (ref 0–0.3)
IMM GRANULOCYTES NFR BLD: 0 %
LYMPHOCYTES NFR BLD: 3.82 K/UL (ref 1.1–3.7)
LYMPHOCYTES RELATIVE PERCENT: 49 % (ref 24–43)
MCH RBC QN AUTO: 29.5 PG (ref 25.2–33.5)
MCHC RBC AUTO-ENTMCNC: 32.5 G/DL (ref 28.4–34.8)
MCV RBC AUTO: 90.5 FL (ref 82.6–102.9)
MONOCYTES NFR BLD: 0.66 K/UL (ref 0.1–1.2)
MONOCYTES NFR BLD: 8 % (ref 3–12)
NEUTROPHILS NFR BLD: 40 % (ref 36–65)
NEUTS SEG NFR BLD: 3.13 K/UL (ref 1.5–8.1)
NRBC BLD-RTO: 0 PER 100 WBC
PLATELET # BLD AUTO: 260 K/UL (ref 138–453)
PMV BLD AUTO: 9.1 FL (ref 8.1–13.5)
RBC # BLD AUTO: 5.6 M/UL (ref 4.21–5.77)
WBC OTHER # BLD: 7.9 K/UL (ref 3.5–11.3)

## 2025-02-20 PROCEDURE — 3700000000 HC ANESTHESIA ATTENDED CARE: Performed by: OBSTETRICS & GYNECOLOGY

## 2025-02-20 PROCEDURE — 88307 TISSUE EXAM BY PATHOLOGIST: CPT

## 2025-02-20 PROCEDURE — 3600000009 HC SURGERY ROBOT BASE: Performed by: OBSTETRICS & GYNECOLOGY

## 2025-02-20 PROCEDURE — 2580000003 HC RX 258

## 2025-02-20 PROCEDURE — 6360000002 HC RX W HCPCS: Performed by: STUDENT IN AN ORGANIZED HEALTH CARE EDUCATION/TRAINING PROGRAM

## 2025-02-20 PROCEDURE — 86901 BLOOD TYPING SEROLOGIC RH(D): CPT

## 2025-02-20 PROCEDURE — 36415 COLL VENOUS BLD VENIPUNCTURE: CPT

## 2025-02-20 PROCEDURE — 86900 BLOOD TYPING SEROLOGIC ABO: CPT

## 2025-02-20 PROCEDURE — 3600000019 HC SURGERY ROBOT ADDTL 15MIN: Performed by: OBSTETRICS & GYNECOLOGY

## 2025-02-20 PROCEDURE — 7100000010 HC PHASE II RECOVERY - FIRST 15 MIN: Performed by: OBSTETRICS & GYNECOLOGY

## 2025-02-20 PROCEDURE — 6370000000 HC RX 637 (ALT 250 FOR IP): Performed by: OBSTETRICS & GYNECOLOGY

## 2025-02-20 PROCEDURE — 2500000003 HC RX 250 WO HCPCS

## 2025-02-20 PROCEDURE — 6370000000 HC RX 637 (ALT 250 FOR IP): Performed by: STUDENT IN AN ORGANIZED HEALTH CARE EDUCATION/TRAINING PROGRAM

## 2025-02-20 PROCEDURE — 7100000001 HC PACU RECOVERY - ADDTL 15 MIN: Performed by: OBSTETRICS & GYNECOLOGY

## 2025-02-20 PROCEDURE — 81025 URINE PREGNANCY TEST: CPT

## 2025-02-20 PROCEDURE — 86850 RBC ANTIBODY SCREEN: CPT

## 2025-02-20 PROCEDURE — 85025 COMPLETE CBC W/AUTO DIFF WBC: CPT

## 2025-02-20 PROCEDURE — 7100000011 HC PHASE II RECOVERY - ADDTL 15 MIN: Performed by: OBSTETRICS & GYNECOLOGY

## 2025-02-20 PROCEDURE — 2500000003 HC RX 250 WO HCPCS: Performed by: OBSTETRICS & GYNECOLOGY

## 2025-02-20 PROCEDURE — 58571 TLH W/T/O 250 G OR LESS: CPT | Performed by: OBSTETRICS & GYNECOLOGY

## 2025-02-20 PROCEDURE — 2709999900 HC NON-CHARGEABLE SUPPLY: Performed by: OBSTETRICS & GYNECOLOGY

## 2025-02-20 PROCEDURE — 6360000002 HC RX W HCPCS

## 2025-02-20 PROCEDURE — 7100000000 HC PACU RECOVERY - FIRST 15 MIN: Performed by: OBSTETRICS & GYNECOLOGY

## 2025-02-20 PROCEDURE — S2900 ROBOTIC SURGICAL SYSTEM: HCPCS | Performed by: OBSTETRICS & GYNECOLOGY

## 2025-02-20 PROCEDURE — 3700000001 HC ADD 15 MINUTES (ANESTHESIA): Performed by: OBSTETRICS & GYNECOLOGY

## 2025-02-20 RX ORDER — OXYCODONE HYDROCHLORIDE 5 MG/1
5 TABLET ORAL EVERY 6 HOURS PRN
Qty: 20 TABLET | Refills: 0 | Status: SHIPPED | OUTPATIENT
Start: 2025-02-20 | End: 2025-02-25

## 2025-02-20 RX ORDER — DEXAMETHASONE SODIUM PHOSPHATE 10 MG/ML
INJECTION, SOLUTION INTRA-ARTICULAR; INTRALESIONAL; INTRAMUSCULAR; INTRAVENOUS; SOFT TISSUE
Status: DISCONTINUED | OUTPATIENT
Start: 2025-02-20 | End: 2025-02-20 | Stop reason: SDUPTHER

## 2025-02-20 RX ORDER — MAGNESIUM HYDROXIDE 1200 MG/15ML
LIQUID ORAL CONTINUOUS PRN
Status: DISCONTINUED | OUTPATIENT
Start: 2025-02-20 | End: 2025-02-20 | Stop reason: HOSPADM

## 2025-02-20 RX ORDER — IBUPROFEN 600 MG/1
600 TABLET, FILM COATED ORAL EVERY 6 HOURS PRN
Qty: 30 TABLET | Refills: 1 | Status: SHIPPED | OUTPATIENT
Start: 2025-02-20

## 2025-02-20 RX ORDER — ONDANSETRON 4 MG/1
4 TABLET, FILM COATED ORAL DAILY PRN
Qty: 30 TABLET | Refills: 0 | Status: SHIPPED | OUTPATIENT
Start: 2025-02-20

## 2025-02-20 RX ORDER — SODIUM CHLORIDE 0.9 % (FLUSH) 0.9 %
5-40 SYRINGE (ML) INJECTION PRN
Status: DISCONTINUED | OUTPATIENT
Start: 2025-02-20 | End: 2025-02-20 | Stop reason: HOSPADM

## 2025-02-20 RX ORDER — KETOROLAC TROMETHAMINE 30 MG/ML
INJECTION, SOLUTION INTRAMUSCULAR; INTRAVENOUS
Status: DISCONTINUED | OUTPATIENT
Start: 2025-02-20 | End: 2025-02-20

## 2025-02-20 RX ORDER — PROCHLORPERAZINE EDISYLATE 5 MG/ML
5 INJECTION INTRAMUSCULAR; INTRAVENOUS
Status: COMPLETED | OUTPATIENT
Start: 2025-02-20 | End: 2025-02-20

## 2025-02-20 RX ORDER — CEFAZOLIN SODIUM 1 G/3ML
INJECTION, POWDER, FOR SOLUTION INTRAMUSCULAR; INTRAVENOUS
Status: DISCONTINUED | OUTPATIENT
Start: 2025-02-20 | End: 2025-02-20 | Stop reason: SDUPTHER

## 2025-02-20 RX ORDER — ONDANSETRON 2 MG/ML
INJECTION INTRAMUSCULAR; INTRAVENOUS
Status: DISCONTINUED | OUTPATIENT
Start: 2025-02-20 | End: 2025-02-20 | Stop reason: SDUPTHER

## 2025-02-20 RX ORDER — LIDOCAINE HYDROCHLORIDE 10 MG/ML
INJECTION, SOLUTION EPIDURAL; INFILTRATION; INTRACAUDAL; PERINEURAL
Status: DISCONTINUED | OUTPATIENT
Start: 2025-02-20 | End: 2025-02-20 | Stop reason: SDUPTHER

## 2025-02-20 RX ORDER — IPRATROPIUM BROMIDE AND ALBUTEROL SULFATE 2.5; .5 MG/3ML; MG/3ML
1 SOLUTION RESPIRATORY (INHALATION)
Status: DISCONTINUED | OUTPATIENT
Start: 2025-02-20 | End: 2025-02-20 | Stop reason: HOSPADM

## 2025-02-20 RX ORDER — SCOPOLAMINE 1 MG/3D
1 PATCH, EXTENDED RELEASE TRANSDERMAL
Status: DISCONTINUED | OUTPATIENT
Start: 2025-02-20 | End: 2025-02-20 | Stop reason: HOSPADM

## 2025-02-20 RX ORDER — NALOXONE HYDROCHLORIDE 0.4 MG/ML
INJECTION, SOLUTION INTRAMUSCULAR; INTRAVENOUS; SUBCUTANEOUS PRN
Status: DISCONTINUED | OUTPATIENT
Start: 2025-02-20 | End: 2025-02-20 | Stop reason: HOSPADM

## 2025-02-20 RX ORDER — BUPIVACAINE HYDROCHLORIDE AND EPINEPHRINE 2.5; 5 MG/ML; UG/ML
INJECTION, SOLUTION EPIDURAL; INFILTRATION; INTRACAUDAL; PERINEURAL PRN
Status: DISCONTINUED | OUTPATIENT
Start: 2025-02-20 | End: 2025-02-20 | Stop reason: ALTCHOICE

## 2025-02-20 RX ORDER — MIDAZOLAM HYDROCHLORIDE 1 MG/ML
INJECTION, SOLUTION INTRAMUSCULAR; INTRAVENOUS
Status: DISCONTINUED | OUTPATIENT
Start: 2025-02-20 | End: 2025-02-20 | Stop reason: SDUPTHER

## 2025-02-20 RX ORDER — METOCLOPRAMIDE HYDROCHLORIDE 5 MG/ML
10 INJECTION INTRAMUSCULAR; INTRAVENOUS
Status: DISCONTINUED | OUTPATIENT
Start: 2025-02-20 | End: 2025-02-20 | Stop reason: HOSPADM

## 2025-02-20 RX ORDER — SODIUM CHLORIDE, SODIUM LACTATE, POTASSIUM CHLORIDE, CALCIUM CHLORIDE 600; 310; 30; 20 MG/100ML; MG/100ML; MG/100ML; MG/100ML
INJECTION, SOLUTION INTRAVENOUS
Status: DISCONTINUED | OUTPATIENT
Start: 2025-02-20 | End: 2025-02-20 | Stop reason: SDUPTHER

## 2025-02-20 RX ORDER — SODIUM CHLORIDE 0.9 % (FLUSH) 0.9 %
5-40 SYRINGE (ML) INJECTION EVERY 12 HOURS SCHEDULED
Status: DISCONTINUED | OUTPATIENT
Start: 2025-02-20 | End: 2025-02-20 | Stop reason: HOSPADM

## 2025-02-20 RX ORDER — OXYCODONE HYDROCHLORIDE 5 MG/1
5 TABLET ORAL
Status: COMPLETED | OUTPATIENT
Start: 2025-02-20 | End: 2025-02-20

## 2025-02-20 RX ORDER — SIMETHICONE 80 MG
80 TABLET,CHEWABLE ORAL 4 TIMES DAILY PRN
Qty: 30 TABLET | Refills: 1 | Status: SHIPPED | OUTPATIENT
Start: 2025-02-20

## 2025-02-20 RX ORDER — KETOROLAC TROMETHAMINE 30 MG/ML
INJECTION, SOLUTION INTRAMUSCULAR; INTRAVENOUS
Status: DISCONTINUED | OUTPATIENT
Start: 2025-02-20 | End: 2025-02-20 | Stop reason: SDUPTHER

## 2025-02-20 RX ORDER — PHENYLEPHRINE HCL IN 0.9% NACL 1 MG/10 ML
SYRINGE (ML) INTRAVENOUS
Status: DISCONTINUED | OUTPATIENT
Start: 2025-02-20 | End: 2025-02-20 | Stop reason: SDUPTHER

## 2025-02-20 RX ORDER — ROCURONIUM BROMIDE 10 MG/ML
INJECTION, SOLUTION INTRAVENOUS
Status: DISCONTINUED | OUTPATIENT
Start: 2025-02-20 | End: 2025-02-20 | Stop reason: SDUPTHER

## 2025-02-20 RX ORDER — LABETALOL HYDROCHLORIDE 5 MG/ML
10 INJECTION, SOLUTION INTRAVENOUS
Status: DISCONTINUED | OUTPATIENT
Start: 2025-02-20 | End: 2025-02-20 | Stop reason: HOSPADM

## 2025-02-20 RX ORDER — PROPOFOL 10 MG/ML
INJECTION, EMULSION INTRAVENOUS
Status: DISCONTINUED | OUTPATIENT
Start: 2025-02-20 | End: 2025-02-20 | Stop reason: SDUPTHER

## 2025-02-20 RX ORDER — SENNA AND DOCUSATE SODIUM 50; 8.6 MG/1; MG/1
2 TABLET, FILM COATED ORAL DAILY
Qty: 14 TABLET | Refills: 0 | Status: SHIPPED | OUTPATIENT
Start: 2025-02-20 | End: 2025-02-27

## 2025-02-20 RX ORDER — FENTANYL CITRATE 50 UG/ML
INJECTION, SOLUTION INTRAMUSCULAR; INTRAVENOUS
Status: DISCONTINUED | OUTPATIENT
Start: 2025-02-20 | End: 2025-02-20 | Stop reason: SDUPTHER

## 2025-02-20 RX ORDER — ACETAMINOPHEN 500 MG
1000 TABLET ORAL 3 TIMES DAILY
Qty: 30 TABLET | Refills: 0 | Status: SHIPPED | OUTPATIENT
Start: 2025-02-20

## 2025-02-20 RX ORDER — SODIUM CHLORIDE 9 MG/ML
INJECTION, SOLUTION INTRAVENOUS PRN
Status: DISCONTINUED | OUTPATIENT
Start: 2025-02-20 | End: 2025-02-20 | Stop reason: HOSPADM

## 2025-02-20 RX ORDER — HYDRALAZINE HYDROCHLORIDE 20 MG/ML
10 INJECTION INTRAMUSCULAR; INTRAVENOUS
Status: DISCONTINUED | OUTPATIENT
Start: 2025-02-20 | End: 2025-02-20 | Stop reason: HOSPADM

## 2025-02-20 RX ADMIN — Medication 100 MCG: at 09:00

## 2025-02-20 RX ADMIN — FENTANYL CITRATE 25 MCG: 50 INJECTION, SOLUTION INTRAMUSCULAR; INTRAVENOUS at 09:47

## 2025-02-20 RX ADMIN — Medication 100 MCG: at 09:17

## 2025-02-20 RX ADMIN — SODIUM CHLORIDE, POTASSIUM CHLORIDE, SODIUM LACTATE AND CALCIUM CHLORIDE: 600; 310; 30; 20 INJECTION, SOLUTION INTRAVENOUS at 07:43

## 2025-02-20 RX ADMIN — FENTANYL CITRATE 25 MCG: 50 INJECTION, SOLUTION INTRAMUSCULAR; INTRAVENOUS at 09:23

## 2025-02-20 RX ADMIN — CEFAZOLIN 2 G: 1 INJECTION, POWDER, FOR SOLUTION INTRAMUSCULAR; INTRAVENOUS at 08:00

## 2025-02-20 RX ADMIN — SODIUM CHLORIDE, SODIUM LACTATE, POTASSIUM CHLORIDE, CALCIUM CHLORIDE: 600; 310; 30; 20 INJECTION, SOLUTION INTRAVENOUS at 07:27

## 2025-02-20 RX ADMIN — SODIUM CHLORIDE, POTASSIUM CHLORIDE, SODIUM LACTATE AND CALCIUM CHLORIDE: 600; 310; 30; 20 INJECTION, SOLUTION INTRAVENOUS at 08:58

## 2025-02-20 RX ADMIN — DEXAMETHASONE SODIUM PHOSPHATE 8 MG: 10 INJECTION INTRAMUSCULAR; INTRAVENOUS at 07:42

## 2025-02-20 RX ADMIN — OXYCODONE 5 MG: 5 TABLET ORAL at 10:41

## 2025-02-20 RX ADMIN — FENTANYL CITRATE 50 MCG: 50 INJECTION, SOLUTION INTRAMUSCULAR; INTRAVENOUS at 08:23

## 2025-02-20 RX ADMIN — ONDANSETRON 4 MG: 2 INJECTION, SOLUTION INTRAMUSCULAR; INTRAVENOUS at 09:26

## 2025-02-20 RX ADMIN — ROCURONIUM BROMIDE 50 MG: 10 INJECTION, SOLUTION INTRAVENOUS at 07:37

## 2025-02-20 RX ADMIN — HYDROMORPHONE HYDROCHLORIDE 0.5 MG: 1 INJECTION, SOLUTION INTRAMUSCULAR; INTRAVENOUS; SUBCUTANEOUS at 10:13

## 2025-02-20 RX ADMIN — LIDOCAINE HYDROCHLORIDE 50 MG: 10 INJECTION, SOLUTION EPIDURAL; INFILTRATION; INTRACAUDAL; PERINEURAL at 07:37

## 2025-02-20 RX ADMIN — Medication 100 MCG: at 09:25

## 2025-02-20 RX ADMIN — PROCHLORPERAZINE EDISYLATE 5 MG: 5 INJECTION INTRAMUSCULAR; INTRAVENOUS at 10:10

## 2025-02-20 RX ADMIN — HYDROMORPHONE HYDROCHLORIDE 0.5 MG: 1 INJECTION, SOLUTION INTRAMUSCULAR; INTRAVENOUS; SUBCUTANEOUS at 10:04

## 2025-02-20 RX ADMIN — MIDAZOLAM 2 MG: 1 INJECTION INTRAMUSCULAR; INTRAVENOUS at 07:29

## 2025-02-20 RX ADMIN — SUGAMMADEX 200 MG: 100 INJECTION, SOLUTION INTRAVENOUS at 09:32

## 2025-02-20 RX ADMIN — Medication 100 MCG: at 08:57

## 2025-02-20 RX ADMIN — ROCURONIUM BROMIDE 30 MG: 10 INJECTION, SOLUTION INTRAVENOUS at 08:28

## 2025-02-20 RX ADMIN — KETOROLAC TROMETHAMINE 30 MG: 30 INJECTION, SOLUTION INTRAMUSCULAR; INTRAVENOUS at 09:25

## 2025-02-20 RX ADMIN — PROPOFOL 200 MG: 10 INJECTION, EMULSION INTRAVENOUS at 07:37

## 2025-02-20 RX ADMIN — FENTANYL CITRATE 50 MCG: 50 INJECTION, SOLUTION INTRAMUSCULAR; INTRAVENOUS at 07:37

## 2025-02-20 ASSESSMENT — PAIN DESCRIPTION - DESCRIPTORS
DESCRIPTORS: SORE

## 2025-02-20 ASSESSMENT — PAIN DESCRIPTION - LOCATION
LOCATION: ABDOMEN

## 2025-02-20 ASSESSMENT — PAIN SCALES - GENERAL
PAINLEVEL_OUTOF10: 5
PAINLEVEL_OUTOF10: 7
PAINLEVEL_OUTOF10: 4
PAINLEVEL_OUTOF10: 7

## 2025-02-20 ASSESSMENT — PAIN - FUNCTIONAL ASSESSMENT
PAIN_FUNCTIONAL_ASSESSMENT: ACTIVITIES ARE NOT PREVENTED
PAIN_FUNCTIONAL_ASSESSMENT: 0-10

## 2025-02-20 NOTE — OP NOTE
Operative Note  Department of Obstetrics and Gynecology  [facility]       Patient: Karon Monroy   : 1995  MRN: [id number]       Acct: [id number]   PCP: Latosha Lucero APRN - NP  Date of Procedure: 25    Pre-operative Diagnosis: 29 y.o. adult     PCOS   Dysmenorrhea   Heavy menses       Post-operative Diagnosis:   PCOS   Dysmenorrhea   Heavy menses       Procedure: Robotic assisted laparoscopic hysterectomy, bilateral salpingectomy, cystoscopy, right oophorectomy     Surgeon: Dr. Seth     Assistant(s): Yolette Guerrero DO, PGY4;      Anesthesia: general via ET tube    Indications: Ms. Karon Monroy is a 29 y.o. adult  who presents with a history of heavy menses, dysmenorrhea and PCOS. He was seen in the office for definitive management and desires hysterectomy. 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 Most recent Pap is up to date and NILM.   R/B/A discussed and consent was signed. Ancef was given pre-operatively.      Procedure Details   The patient was seen in the pre-op room. The risks, benefits, complications, treatment options, and expected outcomes were discussed with the patient. The patient concurred with the proposed plan, giving informed consent. The patient was taken to the Operating Room and identified as Karon Monroy and the procedure was verified. The patient received preoperative antibiotics. A Time Out was held and the above information confirmed.     The patient underwent general endotracheal anesthesia without any complications.  The patient was prepped and draped in a dorsal lithotomy position in [place] stirrups in normal sterile fashion.  A weighted speculum was placed in vagina and the anterior lip of the cervix was grasped with a single-tooth tenaculum.  The cervix was dilated slightly and the V-Care uterine manipulator was placed without any

## 2025-02-20 NOTE — H&P
OB/GYN Pre-Op H&P  [facility]    Patient Name: Karon Monroy     Patient : 1995  Room/Bed: [facility] OR [facility]/NONE  Admission Date/Time: 2025  5:53 AM  Primary Care Physician: Latosha Lucero APRN - NP  MRN: [id number]    Date: 2025  Time: 7:01 AM    Patient is a 29 y.o. with a well documented history of heavy menses with dysmenorrhea. He has a suspected history of PCOS, however this was never confirmed. He was seen in the office for definitive management and desires hysterectomy. Pelvic US on 2/10/25 showed a  septate versus bicornuate uterus and bilateral ovaries consistent with PCOS. Most recent Pap is up to date and NILM.     The patient was seen in pre-op holding. He is here for a robotic assisted laparoscopic hysterectomy, bilateral salpingectomy, cystoscopy, possible oophorectomy.    The procedure risks and complications were reviewed.  The labs, Consent, and H&P were reviewed and updated.  The patient was counseled on the possibility of  the need of a second surgery.  The patient voiced understanding and had all of her questions answered. The possibility of incomplete removal of abnormal tissue was discussed.    OBSTETRICAL HISTORY:   OB History    Para Term  AB Living   0 0 0 0 0 0   SAB IAB Ectopic Molar Multiple Live Births   0 0 0 0 0 0       PAST MEDICAL HISTORY:   has a past medical history of ADHD (attention deficit hyperactivity disorder), Allergic rhinitis, Anemia, Anxiety, Chronic eczema, Concussion with no loss of consciousness, COVID-19 vaccine administered, Depression, GERD (gastroesophageal reflux disease), Headache, Hypothyroid, Migraine, Obesity, Psoriasis, Seasonal allergies, Sleep difficulties, Tachycardia, Wears glasses, and Wellness examination.    PAST SURGICAL HISTORY:   has a past surgical history that includes [place] tooth extraction; Mastectomy (Bilateral, 2021); Esophagogastroduodenoscopy (2022); Upper

## 2025-02-20 NOTE — ANESTHESIA PRE PROCEDURE
Department of Anesthesiology  Preprocedure Note       Name:  Karon Monroy   Age:  29 y.o.  :  1995                                          MRN:  [id number]         Date:  2025      Surgeon: Surgeon(s):  Kacie Seth MD    Procedure: Procedure(s):  ROBOTIC LAPAROSCOPIC ASSISTED  ABDOMINAL HYSTERECTOMY, BILATERAL SALPINGECTOMY, CYSTOSCOPY, OOPHORECTOMY    Medications prior to admission:   Prior to Admission medications    Medication Sig Start Date End Date Taking? Authorizing Provider   ketoconazole (NIZORAL) 2 % cream Apply to affected area BID PRN flares.  Combine with triamcinolone 0.025% cream when using. 25  Yes Steven Buenrostro PA-C   omeprazole (PRILOSEC) 40 MG delayed release capsule Take 1 capsule by mouth at bedtime 2/14/25 5/15/25 Yes Mirian Tamez MD   fluticasone (FLONASE) 50 MCG/ACT nasal spray inhale 2 SPRAYS into EACH nostril ONCE DAILY 25  Yes Latosha Lucero APRN - NP   atenolol (TENORMIN) 25 MG tablet Take 1 tablet by mouth daily   Yes Renu Armas MD   vitamin D (ERGOCALCIFEROL) 1.25 MG (29323 UT) CAPS capsule  20  Yes Renu Armas MD   levothyroxine (SYNTHROID) 50 MCG tablet Take 1 tablet by mouth Daily   Yes Renu Armas MD   atorvastatin (LIPITOR) 10 MG tablet TAKE 1 TABLET BY MOUTH ONE TIME A DAY 19  Yes Renu Armas MD   acarbose (PRECOSE) 25 MG tablet Take 1 tablet by mouth 3 times daily (with meals)    Renu Armas MD   cetirizine (ZYRTEC) 10 MG tablet TAKE 1 TABLET BY MOUTH ONCE DAILY 24   Latosha Lucero APRN - NP   amitriptyline (ELAVIL) 50 MG tablet Take 1 tablet by mouth nightly    Renu Armas MD   ketoconazole (NIZORAL) 2 % shampoo Apply 3-4 times weekly to scalp, leave on for five minutes prior to washing off 4/3/23   Steven Buenrostro PA-C   triamcinolone (KENALOG) 0.1 % cream Apply to rash twice daily (not face, armpit or groin) 3/11/22   Steven Buenrostro PA-C   busPIRone (BUSPAR)

## 2025-02-20 NOTE — PROGRESS NOTES
Discharge instructions discuss with patient and grandma, verbalizes understanding. All belongings given to patient. Discharge per wheelchair in a stable condition.

## 2025-02-20 NOTE — ANESTHESIA POSTPROCEDURE EVALUATION
Department of Anesthesiology  Postprocedure Note    Patient: Karon Monroy  MRN: [id number]  YOB: 1995  Date of evaluation: 2/20/2025    Procedure Summary       Date: 02/20/25 Room / Location: [address]    Anesthesia Start: 0728 Anesthesia Stop: 0949    Procedure: ROBOTIC LAPAROSCOPIC ASSISTED  ABDOMINAL HYSTERECTOMY, BILATERAL SALPINGECTOMY, CYSTOSCOPY, RIGHT OOPHORECTOMY Diagnosis:       PCOS (polycystic ovarian syndrome)      Menorrhagia with regular cycle      Obesity      (PCOS (polycystic ovarian syndrome) [E28.2])      (Menorrhagia with regular cycle [N92.0])      (Obesity [E66.9])    Surgeons: Kacie Seth MD Responsible Provider:     Anesthesia Type: General ASA Status: 2            Anesthesia Type: General    Aleksandr Phase I: Aleksandr Score: 10    Aleksandr Phase II:      Anesthesia Post Evaluation    Patient location during evaluation: bedside  Patient participation: complete - patient participated  Level of consciousness: awake  Airway patency: patent  Nausea & Vomiting: no nausea and no vomiting  Cardiovascular status: blood pressure returned to baseline  Respiratory status: acceptable  Hydration status: euvolemic  Comments: BP (!) 130/101   Pulse (!) 125   Temp 98.1 °F (36.7 °C) (Temporal)   Resp 17   Ht 1.575 m (5' 2\")   Wt 91.6 kg (202 lb)   LMP  (Approximate) Comment: last period- 9/2018  SpO2 100%   BMI 36.95 kg/m²     Pain management: adequate    No notable events documented.

## 2025-02-20 NOTE — DISCHARGE INSTRUCTIONS
No alcoholic beverages, no driving or operating machinery, no making important decisions for 24 hours.   You may have a normal diet but should eat lightly day of surgery.  Drink plenty of fluids.  Urinate within 8 hours after surgery, if unable to urinate call your doctor      scopolamine Patch Home Instructions    1.  Remove Scopolamine patch behind ear in 3 days  2.  Wash hands before and after removing patch  3.  This medication may cause headaches, dry mouth and/or dilated pupils

## 2025-02-21 LAB
HCG, PREGNANCY URINE (POC): NEGATIVE
SURGICAL PATHOLOGY REPORT: NORMAL

## 2025-03-03 ENCOUNTER — PATIENT MESSAGE (OUTPATIENT)
Dept: OBGYN CLINIC | Age: 30
End: 2025-03-03

## 2025-03-03 ENCOUNTER — OFFICE VISIT (OUTPATIENT)
Dept: OBGYN CLINIC | Age: 30
End: 2025-03-03
Payer: COMMERCIAL

## 2025-03-03 VITALS
WEIGHT: 202 LBS | HEART RATE: 101 BPM | DIASTOLIC BLOOD PRESSURE: 85 MMHG | BODY MASS INDEX: 37.17 KG/M2 | SYSTOLIC BLOOD PRESSURE: 128 MMHG | HEIGHT: 62 IN

## 2025-03-03 DIAGNOSIS — Z30.46 ENCOUNTER FOR NEXPLANON REMOVAL: Primary | ICD-10-CM

## 2025-03-03 DIAGNOSIS — Z09 POSTOP CHECK: ICD-10-CM

## 2025-03-03 PROCEDURE — 11982 REMOVE DRUG IMPLANT DEVICE: CPT | Performed by: OBSTETRICS & GYNECOLOGY

## 2025-03-09 ENCOUNTER — OFFICE VISIT (OUTPATIENT)
Dept: PRIMARY CARE CLINIC | Age: 30
End: 2025-03-09

## 2025-03-09 VITALS
SYSTOLIC BLOOD PRESSURE: 121 MMHG | WEIGHT: 202 LBS | BODY MASS INDEX: 37.17 KG/M2 | OXYGEN SATURATION: 96 % | TEMPERATURE: 99.4 F | HEIGHT: 62 IN | HEART RATE: 108 BPM | DIASTOLIC BLOOD PRESSURE: 78 MMHG

## 2025-03-09 DIAGNOSIS — J02.9 SORE THROAT: ICD-10-CM

## 2025-03-09 DIAGNOSIS — R52 GENERALIZED BODY ACHES: ICD-10-CM

## 2025-03-09 DIAGNOSIS — J02.0 ACUTE STREPTOCOCCAL PHARYNGITIS: Primary | ICD-10-CM

## 2025-03-09 LAB
INFLUENZA A ANTIGEN, POC: NEGATIVE
INFLUENZA B ANTIGEN, POC: NEGATIVE
LOT EXPIRE DATE: 1
LOT KIT NUMBER: 1
S PYO AG THROAT QL: POSITIVE
SARS-COV-2, POC: NORMAL
VALID INTERNAL CONTROL: NORMAL
VENDOR AND KIT NAME POC: NORMAL

## 2025-03-09 RX ORDER — AZITHROMYCIN 250 MG/1
TABLET, FILM COATED ORAL
Qty: 1 PACKET | Refills: 0 | Status: SHIPPED | OUTPATIENT
Start: 2025-03-09

## 2025-03-12 ENCOUNTER — TELEPHONE (OUTPATIENT)
Age: 30
End: 2025-03-12

## 2025-03-12 DIAGNOSIS — L21.9 SEBORRHEIC DERMATITIS: ICD-10-CM

## 2025-03-12 RX ORDER — KETOCONAZOLE 20 MG/ML
SHAMPOO, SUSPENSION TOPICAL
Qty: 120 ML | Refills: 5 | Status: SHIPPED | OUTPATIENT
Start: 2025-03-12

## 2025-04-02 ENCOUNTER — OFFICE VISIT (OUTPATIENT)
Dept: OBGYN CLINIC | Age: 30
End: 2025-04-02

## 2025-04-02 VITALS
WEIGHT: 197 LBS | HEART RATE: 75 BPM | HEIGHT: 62 IN | DIASTOLIC BLOOD PRESSURE: 74 MMHG | SYSTOLIC BLOOD PRESSURE: 106 MMHG | BODY MASS INDEX: 36.25 KG/M2

## 2025-04-02 DIAGNOSIS — Z09 POSTOP CHECK: Primary | ICD-10-CM

## 2025-04-02 PROCEDURE — 99024 POSTOP FOLLOW-UP VISIT: CPT | Performed by: OBSTETRICS & GYNECOLOGY

## 2025-04-02 NOTE — PROGRESS NOTES
habits      Priority: Medium    Gastritis without bleeding      Priority: Medium    Migraines 06/30/2022     Priority: Medium    Female-to-male transgender person 05/23/2022     Priority: Medium    s/p RALH, cysto, BS, RO 2/20/25 02/20/2025    Post-op pain 02/20/2025    Menorrhagia with regular cycle 01/23/2025    Obesity 01/23/2025    PCOS (polycystic ovarian syndrome) 01/22/2025    Chronic fatigue 09/10/2024    Arachnoid cyst 04/08/2022    Hyperlipidemia 03/13/2022    Other fatigue 03/13/2022    Acute diarrhea 03/13/2022    Gastroesophageal reflux disease without esophagitis 03/13/2022    Allergic rhinitis 03/13/2022    Dermatitis 03/13/2022    Congenital pes planus 03/13/2022    Hypothyroidism 03/08/2022    Vitamin D deficiency 03/08/2022    On statin therapy 03/08/2022    Pain in both feet 08/10/2016    Excessive daytime sleepiness 06/21/2016    Flat feet, bilateral 06/21/2016    Depression with anxiety 10/28/2014          POD# 6 wk   Procedure: see above   Stable   Pathology reviewed and found to be benign. Yes    Plan:   Return for annual exam.  Vaginal restrictions x2 more weeks    Kacie Seth MD

## 2025-04-30 ENCOUNTER — OFFICE VISIT (OUTPATIENT)
Dept: PRIMARY CARE CLINIC | Age: 30
End: 2025-04-30
Payer: COMMERCIAL

## 2025-04-30 VITALS
BODY MASS INDEX: 36.25 KG/M2 | SYSTOLIC BLOOD PRESSURE: 119 MMHG | HEIGHT: 62 IN | DIASTOLIC BLOOD PRESSURE: 79 MMHG | OXYGEN SATURATION: 97 % | WEIGHT: 197 LBS | HEART RATE: 111 BPM

## 2025-04-30 DIAGNOSIS — L55.9 SUNBURN: Primary | ICD-10-CM

## 2025-04-30 PROCEDURE — G8417 CALC BMI ABV UP PARAM F/U: HCPCS | Performed by: NURSE PRACTITIONER

## 2025-04-30 PROCEDURE — 99213 OFFICE O/P EST LOW 20 MIN: CPT | Performed by: NURSE PRACTITIONER

## 2025-04-30 PROCEDURE — G8427 DOCREV CUR MEDS BY ELIG CLIN: HCPCS | Performed by: NURSE PRACTITIONER

## 2025-04-30 PROCEDURE — 1036F TOBACCO NON-USER: CPT | Performed by: NURSE PRACTITIONER

## 2025-04-30 RX ORDER — PREDNISONE 20 MG/1
20 TABLET ORAL DAILY
Qty: 5 TABLET | Refills: 0 | Status: SHIPPED | OUTPATIENT
Start: 2025-04-30 | End: 2025-05-05

## 2025-04-30 RX ORDER — LIDOCAINE 50 MG/G
OINTMENT TOPICAL 3 TIMES DAILY PRN
Qty: 50 G | Refills: 0 | Status: SHIPPED | OUTPATIENT
Start: 2025-04-30

## 2025-04-30 ASSESSMENT — ENCOUNTER SYMPTOMS
COUGH: 0
WHEEZING: 0
CHEST TIGHTNESS: 0
RESPIRATORY NEGATIVE: 1
SHORTNESS OF BREATH: 0

## 2025-04-30 NOTE — PROGRESS NOTES
[facility] PHYSICIANS [facility], [facility] [place] [facility]  [address] [facility] [address]  Dept: [phone]     Karon Monroy is a 29 y.o. adult who presents to the urgent care today for his medicalconditions/complaints as noted below.  Karon Monroy is c/o of Sunburn (Chest, shoulder, arms)    HPI:     Rash  This is a new problem. Episode onset: 3 days ago. The problem has been gradually worsening since onset. The affected locations include the chest, left shoulder, right shoulder, left arm and right arm. The rash is characterized by itchiness, redness and burning. Associated with: sun contact. Pertinent negatives include no cough, fatigue, fever or shortness of breath. Past treatments include nothing. The treatment provided no relief.     Past Medical History:   Diagnosis Date    ADHD (attention deficit hyperactivity disorder)     Allergic rhinitis     Anemia     Anxiety     Chronic eczema     generalized    Concussion with no loss of consciousness 04/08/2022    COVID-19 vaccine administered     Depression     GERD (gastroesophageal reflux disease)     Headache     Hypothyroid     Migraine     Obesity     Psoriasis     scalp    Seasonal allergies     Sleep difficulties     Tachycardia     Wears glasses     Wellness examination     Latosha Lucero, CNP, PCP      Current Outpatient Medications   Medication Sig Dispense Refill    predniSONE (DELTASONE) 20 MG tablet Take 1 tablet by mouth daily for 5 days 5 tablet 0    lidocaine (XYLOCAINE) 5 % ointment Apply topically 3 times daily as needed for Pain Apply topically as needed. 50 g 0    ketoconazole (NIZORAL) 2 % shampoo Apply 3-4 times weekly to scalp, leave on for five minutes prior to washing off 120 mL 5    azithromycin (ZITHROMAX Z-ELIEZER) 250 MG tablet Take 2 tabs on day 1 followed by 1 tab on days 2-5. 1 packet 0    simethicone (MYLICON) 80 MG chewable tablet Take 1 tablet by mouth 4 times daily as needed for

## 2025-06-12 DIAGNOSIS — J30.1 SEASONAL ALLERGIC RHINITIS DUE TO POLLEN: ICD-10-CM

## 2025-06-16 RX ORDER — CETIRIZINE HYDROCHLORIDE 10 MG/1
10 TABLET ORAL DAILY
Qty: 90 TABLET | Refills: 1 | Status: SHIPPED | OUTPATIENT
Start: 2025-06-16

## 2025-06-16 NOTE — TELEPHONE ENCOUNTER
Last visit: 02/14/2025  Last Med refill: 06/12/2025  Does patient have enough medication for 72 hours: Yes    Next Visit Date:  Future Appointments   Date Time Provider Department Center   8/28/2025  2:00 PM Steven Buenrostro PA-C [facility]       Health Maintenance   Topic Date Due    Varicella vaccine (1 of 2 - 13+ 2-dose series) Never done    Hepatitis B vaccine (3 of 3 - 19+ 3-dose series) 07/11/2021    COVID-19 Vaccine (5 - 2024-25 season) 09/01/2024    Flu vaccine (Season Ended) 08/01/2025    Lipids  08/26/2025    Depression Monitoring  02/13/2026    DTaP/Tdap/Td vaccine (2 - Td or Tdap) 05/30/2034    Hepatitis A vaccine  Completed    Hepatitis C screen  Completed    HIV screen  Completed    Hib vaccine  Aged Out    HPV vaccine  Aged Out    Polio vaccine  Aged Out    Meningococcal (ACWY) vaccine  Aged Out    Meningococcal B vaccine  Aged Out    Pneumococcal 0-49 years Vaccine  Aged Out    A1C test (Diabetic or Prediabetic)  Discontinued    Chlamydia/GC screen  Discontinued    Pap smear  Discontinued       Hemoglobin A1C (%)   Date Value   06/06/2025 5.6   02/24/2025 5.7   02/14/2025 5.4             ( goal A1C is < 7)   No components found for: \"LABMICR\"  No components found for: \"LDLCHOLESTEROL\", \"LDLCALC\"    (goal LDL is <100)   AST (U/L)   Date Value   08/26/2024 28     ALT (U/L)   Date Value   08/26/2024 42     BUN (mg/dL)   Date Value   08/26/2024 12     BP Readings from Last 3 Encounters:   04/30/25 119/79   04/02/25 106/74   03/09/25 121/78          (goal 120/80)    All Future Testing planned in [facility]  Lab Frequency Next Occurrence   Basic Metabolic Panel Once 08/14/2024   EGD Once 01/07/2025               Patient Active Problem List:     Depression with anxiety     Excessive daytime sleepiness     Flat feet, bilateral     Pain in both feet     Hypothyroidism     Vitamin D deficiency     On statin therapy     Hyperlipidemia     Other fatigue     Acute diarrhea     Gastroesophageal reflux disease

## 2025-06-23 ENCOUNTER — RESULTS FOLLOW-UP (OUTPATIENT)
Dept: FAMILY MEDICINE CLINIC | Age: 30
End: 2025-06-23

## 2025-06-23 DIAGNOSIS — M53.3 COCCYX PAIN: ICD-10-CM

## (undated) DEVICE — COUNTER NDL 10 COUNT HLD 20 FOAM BLK SGL MAG

## (undated) DEVICE — INSUFFLATION NEEDLE TO ESTABLISH PNEUMOPERITONEUM.: Brand: INSUFFLATION NEEDLE

## (undated) DEVICE — APPLICATOR MEDICATED 26 CC SOLUTION HI LT ORNG CHLORAPREP

## (undated) DEVICE — ARM DRAPE

## (undated) DEVICE — PAD PT POS 36 IN SURGYPAD DISP

## (undated) DEVICE — SHEET,DRAPE,70X100,STERILE: Brand: MEDLINE

## (undated) DEVICE — 4-PORT MANIFOLD: Brand: NEPTUNE 2

## (undated) DEVICE — VCARE LARGE, UTERINE MANIPULATOR, VAGINAL-CERVICAL-AHLUWALIA'S-RETRACTOR-ELEVATOR: Brand: VCARE

## (undated) DEVICE — TOWEL,OR,DSP,ST,NATURAL,DLX,4/PK,20PK/CS: Brand: MEDLINE

## (undated) DEVICE — GARMENT,MEDLINE,DVT,INT,CALF,MED, GEN2: Brand: MEDLINE

## (undated) DEVICE — BITEBLOCK 54FR W/ DENT RIM BLOX

## (undated) DEVICE — TUBING INSUFFLATION CAP W/ EXT CARBON DIOX ENDO SMARTCAP

## (undated) DEVICE — Device: Brand: DEFENDO VALVE AND CONNECTOR KIT

## (undated) DEVICE — COVER,LIGHT HANDLE,FLX,2/PK: Brand: MEDLINE INDUSTRIES, INC.

## (undated) DEVICE — ELECTRO LUBE IS A SINGLE PATIENT USE DEVICE THAT IS INTENDED TO BE USED ON ELECTROSURGICAL ELECTRODES TO REDUCE STICKING.: Brand: KEY SURGICAL ELECTRO LUBE

## (undated) DEVICE — CYSTO/BLADDER IRRIGATION SET, REGULATING CLAMP

## (undated) DEVICE — SCISSOR SURG METZ CRV TIP

## (undated) DEVICE — SOLUTION IV IRRIG WATER 1000ML POUR BRL 2F7114

## (undated) DEVICE — PERRYSBURG ENDO PACK: Brand: MEDLINE INDUSTRIES, INC.

## (undated) DEVICE — VCARE SMALL, UTERINE MANIPULATOR, VAGINAL-CERVICAL-AHLUWALIA'S-RETRACTOR-ELEVATOR: Brand: VCARE

## (undated) DEVICE — ENDO KIT W/SYRINGE: Brand: MEDLINE INDUSTRIES, INC.

## (undated) DEVICE — GAUZE,SPONGE,FLUFF,6"X6.75",STRL,5/TRAY: Brand: MEDLINE

## (undated) DEVICE — GLOVE SURG SZ 6 THK91MIL LTX FREE SYN POLYISOPRENE ANTI

## (undated) DEVICE — Device

## (undated) DEVICE — FORCEPS BX L240CM JAW DIA2.8MM L CAP W/ NDL MIC MESH TOOTH

## (undated) DEVICE — VCARE MEDIUM, UTERINE MANIPULATOR, VAGINAL-CERVICAL-AHLUWALIA'S-RETRACTOR-ELEVATOR: Brand: VCARE

## (undated) DEVICE — SOLUTION IV 500ML 0.9% SOD CHL PH 5 INJ USP VIAFLX PLAS

## (undated) DEVICE — AIRSEAL 8 MM ACCESS PORT AND LOW PROFILE OBTURATOR WITH BLADELESS OPTICAL TIP, 120 MM LENGTH: Brand: AIRSEAL

## (undated) DEVICE — STRAP,POSITIONING,KNEE/BODY,FOAM,4X60": Brand: MEDLINE

## (undated) DEVICE — SURGICEL ENDOSCP APPL

## (undated) DEVICE — SUTURE MONOCRYL + SZ 4-0 L18IN ABSRB UD L19MM PS-2 3/8 CIR MCP496G

## (undated) DEVICE — TIP COVER ACCESSORY

## (undated) DEVICE — COVER OR TBL W40XL90IN ABSRB STD AND GRIPPY BK SAHARA

## (undated) DEVICE — LIQUIBAND RAPID ADHESIVE 36/CS 0.8ML: Brand: MEDLINE

## (undated) DEVICE — PROTECTOR ULN NRV PUR FOAM HK LOOP STRP ANATOMICALLY

## (undated) DEVICE — DRAPE,UNDRBUT,WHT GRAD PCH,CAPPORT,20/CS: Brand: MEDLINE

## (undated) DEVICE — Z DISCONTINUED USE 2751540 TUBING IRRIG L10IN DISP PMP ENDOGATOR

## (undated) DEVICE — SUTURE VICRYL + SZ 0 L27IN ABSRB WHT CT-2 1/2 CIR TAPERPOINT VCP270H

## (undated) DEVICE — SOLUTION SCRB 4OZ 2% CHG FOR SURG SCRBBING HND WSH

## (undated) DEVICE — SUTURE MONOCRYL + SZ 4 0 L18IN ABSRB UD PC 3 L16MM 3 8 CIR PRIM MCP845G

## (undated) DEVICE — BLADELESS OBTURATOR: Brand: WECK VISTA

## (undated) DEVICE — TRI-LUMEN FILTERED TUBE SET WITH ACTIVATED CHARCOAL FILTER: Brand: AIRSEAL

## (undated) DEVICE — CONTAINER,SPECIMEN,4OZ,OR STRL: Brand: MEDLINE

## (undated) DEVICE — 1LYRTR 16FR10ML100%SIL UMS SNP: Brand: MEDLINE INDUSTRIES, INC.

## (undated) DEVICE — SEAL

## (undated) DEVICE — DEFENDO AIR WATER SUCTION AND BIOPSY VALVE KIT FOR  OLYMPUS: Brand: DEFENDO AIR/WATER/SUCTION AND BIOPSY VALVE

## (undated) DEVICE — SOLUTION ANTIFOG VIS SYS CLEARIFY LAPSCP